# Patient Record
Sex: MALE | Race: WHITE | NOT HISPANIC OR LATINO | Employment: FULL TIME | URBAN - METROPOLITAN AREA
[De-identification: names, ages, dates, MRNs, and addresses within clinical notes are randomized per-mention and may not be internally consistent; named-entity substitution may affect disease eponyms.]

---

## 2020-07-31 ENCOUNTER — APPOINTMENT (OUTPATIENT)
Dept: RADIOLOGY | Facility: MEDICAL CENTER | Age: 55
DRG: 854 | End: 2020-07-31
Attending: EMERGENCY MEDICINE
Payer: COMMERCIAL

## 2020-07-31 ENCOUNTER — HOSPITAL ENCOUNTER (INPATIENT)
Facility: MEDICAL CENTER | Age: 55
LOS: 9 days | DRG: 854 | End: 2020-08-10
Attending: EMERGENCY MEDICINE | Admitting: HOSPITALIST
Payer: COMMERCIAL

## 2020-07-31 DIAGNOSIS — E11.628 DIABETIC FOOT INFECTION (HCC): ICD-10-CM

## 2020-07-31 DIAGNOSIS — L08.9 WOUND INFECTION: ICD-10-CM

## 2020-07-31 DIAGNOSIS — R73.9 HYPERGLYCEMIA: ICD-10-CM

## 2020-07-31 DIAGNOSIS — T14.8XXA WOUND INFECTION: ICD-10-CM

## 2020-07-31 DIAGNOSIS — L08.9 DIABETIC FOOT INFECTION (HCC): ICD-10-CM

## 2020-07-31 LAB
ALBUMIN SERPL BCP-MCNC: 3.3 G/DL (ref 3.2–4.9)
ALBUMIN/GLOB SERPL: 1 G/DL
ALP SERPL-CCNC: 65 U/L (ref 30–99)
ALT SERPL-CCNC: 14 U/L (ref 2–50)
ANION GAP SERPL CALC-SCNC: 8 MMOL/L (ref 7–16)
AST SERPL-CCNC: 15 U/L (ref 12–45)
BASOPHILS # BLD AUTO: 0.7 % (ref 0–1.8)
BASOPHILS # BLD: 0.09 K/UL (ref 0–0.12)
BILIRUB SERPL-MCNC: 0.2 MG/DL (ref 0.1–1.5)
BUN SERPL-MCNC: 22 MG/DL (ref 8–22)
CALCIUM SERPL-MCNC: 8.2 MG/DL (ref 8.4–10.2)
CHLORIDE SERPL-SCNC: 104 MMOL/L (ref 96–112)
CO2 SERPL-SCNC: 23 MMOL/L (ref 20–33)
CREAT SERPL-MCNC: 1.26 MG/DL (ref 0.5–1.4)
EOSINOPHIL # BLD AUTO: 0.3 K/UL (ref 0–0.51)
EOSINOPHIL NFR BLD: 2.3 % (ref 0–6.9)
ERYTHROCYTE [DISTWIDTH] IN BLOOD BY AUTOMATED COUNT: 42.2 FL (ref 35.9–50)
GLOBULIN SER CALC-MCNC: 3.3 G/DL (ref 1.9–3.5)
GLUCOSE BLD-MCNC: 305 MG/DL (ref 65–99)
GLUCOSE SERPL-MCNC: 340 MG/DL (ref 65–99)
HCT VFR BLD AUTO: 42 % (ref 42–52)
HGB BLD-MCNC: 14.3 G/DL (ref 14–18)
IMM GRANULOCYTES # BLD AUTO: 0.05 K/UL (ref 0–0.11)
IMM GRANULOCYTES NFR BLD AUTO: 0.4 % (ref 0–0.9)
LYMPHOCYTES # BLD AUTO: 2.97 K/UL (ref 1–4.8)
LYMPHOCYTES NFR BLD: 22.3 % (ref 22–41)
MCH RBC QN AUTO: 30.6 PG (ref 27–33)
MCHC RBC AUTO-ENTMCNC: 34 G/DL (ref 33.7–35.3)
MCV RBC AUTO: 89.7 FL (ref 81.4–97.8)
MONOCYTES # BLD AUTO: 1.42 K/UL (ref 0–0.85)
MONOCYTES NFR BLD AUTO: 10.7 % (ref 0–13.4)
NEUTROPHILS # BLD AUTO: 8.5 K/UL (ref 1.82–7.42)
NEUTROPHILS NFR BLD: 63.6 % (ref 44–72)
NRBC # BLD AUTO: 0 K/UL
NRBC BLD-RTO: 0 /100 WBC
PLATELET # BLD AUTO: 271 K/UL (ref 164–446)
PMV BLD AUTO: 10.1 FL (ref 9–12.9)
POTASSIUM SERPL-SCNC: 4.6 MMOL/L (ref 3.6–5.5)
PROT SERPL-MCNC: 6.6 G/DL (ref 6–8.2)
RBC # BLD AUTO: 4.68 M/UL (ref 4.7–6.1)
SODIUM SERPL-SCNC: 135 MMOL/L (ref 135–145)
WBC # BLD AUTO: 13.3 K/UL (ref 4.8–10.8)

## 2020-07-31 PROCEDURE — 87040 BLOOD CULTURE FOR BACTERIA: CPT

## 2020-07-31 PROCEDURE — 96365 THER/PROPH/DIAG IV INF INIT: CPT

## 2020-07-31 PROCEDURE — 80053 COMPREHEN METABOLIC PANEL: CPT

## 2020-07-31 PROCEDURE — 700101 HCHG RX REV CODE 250: Performed by: EMERGENCY MEDICINE

## 2020-07-31 PROCEDURE — 85025 COMPLETE CBC W/AUTO DIFF WBC: CPT

## 2020-07-31 PROCEDURE — 73630 X-RAY EXAM OF FOOT: CPT | Mod: RT

## 2020-07-31 PROCEDURE — 99285 EMERGENCY DEPT VISIT HI MDM: CPT

## 2020-07-31 PROCEDURE — 82962 GLUCOSE BLOOD TEST: CPT

## 2020-07-31 RX ORDER — CLINDAMYCIN PHOSPHATE 600 MG/50ML
600 INJECTION, SOLUTION INTRAVENOUS ONCE
Status: COMPLETED | OUTPATIENT
Start: 2020-07-31 | End: 2020-07-31

## 2020-07-31 RX ADMIN — CLINDAMYCIN IN 5 PERCENT DEXTROSE 600 MG: 12 INJECTION, SOLUTION INTRAVENOUS at 22:27

## 2020-07-31 ASSESSMENT — ENCOUNTER SYMPTOMS
SHORTNESS OF BREATH: 0
FEVER: 0
NAUSEA: 0
ABDOMINAL PAIN: 0
VOMITING: 0
HEADACHES: 0
CHILLS: 0

## 2020-08-01 ENCOUNTER — APPOINTMENT (OUTPATIENT)
Dept: RADIOLOGY | Facility: MEDICAL CENTER | Age: 55
DRG: 854 | End: 2020-08-01
Attending: NURSE PRACTITIONER
Payer: COMMERCIAL

## 2020-08-01 ENCOUNTER — APPOINTMENT (OUTPATIENT)
Dept: CARDIOLOGY | Facility: MEDICAL CENTER | Age: 55
DRG: 854 | End: 2020-08-01
Attending: NURSE PRACTITIONER
Payer: COMMERCIAL

## 2020-08-01 PROBLEM — L08.9 DIABETIC FOOT INFECTION (HCC): Status: ACTIVE | Noted: 2020-08-01

## 2020-08-01 PROBLEM — E66.09 CLASS 1 OBESITY DUE TO EXCESS CALORIES WITHOUT SERIOUS COMORBIDITY WITH BODY MASS INDEX (BMI) OF 33.0 TO 33.9 IN ADULT: Status: ACTIVE | Noted: 2020-08-01

## 2020-08-01 PROBLEM — A41.9 SEPSIS (HCC): Status: ACTIVE | Noted: 2020-08-01

## 2020-08-01 PROBLEM — E11.628 DIABETIC FOOT INFECTION (HCC): Status: ACTIVE | Noted: 2020-08-01

## 2020-08-01 PROBLEM — E11.65 TYPE 2 DIABETES MELLITUS WITH HYPERGLYCEMIA, WITHOUT LONG-TERM CURRENT USE OF INSULIN (HCC): Status: ACTIVE | Noted: 2020-08-01

## 2020-08-01 LAB
APTT PPP: 28.7 SEC (ref 24.7–36)
APTT PPP: 41.7 SEC (ref 24.7–36)
COVID ORDER STATUS COVID19: NORMAL
CRP SERPL HS-MCNC: 6.23 MG/DL (ref 0–0.75)
ERYTHROCYTE [SEDIMENTATION RATE] IN BLOOD BY WESTERGREN METHOD: 45 MM/HOUR (ref 0–20)
EST. AVERAGE GLUCOSE BLD GHB EST-MCNC: 252 MG/DL
GLUCOSE BLD-MCNC: 112 MG/DL (ref 65–99)
GLUCOSE BLD-MCNC: 141 MG/DL (ref 65–99)
GLUCOSE BLD-MCNC: 168 MG/DL (ref 65–99)
HBA1C MFR BLD: 10.4 % (ref 0–5.6)
LACTATE BLD-SCNC: 1 MMOL/L (ref 0.5–2)
LACTATE BLD-SCNC: 1.2 MMOL/L (ref 0.5–2)
LV EJECT FRACT  99904: 60
LV EJECT FRACT MOD 2C 99903: 56.2
LV EJECT FRACT MOD 4C 99902: 58.54
LV EJECT FRACT MOD BP 99901: 57.67
SARS-COV-2 RNA RESP QL NAA+PROBE: NOTDETECTED
SPECIMEN SOURCE: NORMAL

## 2020-08-01 PROCEDURE — 85730 THROMBOPLASTIN TIME PARTIAL: CPT

## 2020-08-01 PROCEDURE — 93306 TTE W/DOPPLER COMPLETE: CPT | Mod: 26 | Performed by: INTERNAL MEDICINE

## 2020-08-01 PROCEDURE — 87641 MR-STAPH DNA AMP PROBE: CPT

## 2020-08-01 PROCEDURE — 87186 SC STD MICRODIL/AGAR DIL: CPT | Mod: 91

## 2020-08-01 PROCEDURE — A9270 NON-COVERED ITEM OR SERVICE: HCPCS | Performed by: INTERNAL MEDICINE

## 2020-08-01 PROCEDURE — A9270 NON-COVERED ITEM OR SERVICE: HCPCS | Performed by: HOSPITALIST

## 2020-08-01 PROCEDURE — 93306 TTE W/DOPPLER COMPLETE: CPT

## 2020-08-01 PROCEDURE — 73700 CT LOWER EXTREMITY W/O DYE: CPT | Mod: RT

## 2020-08-01 PROCEDURE — 700117 HCHG RX CONTRAST REV CODE 255: Performed by: NURSE PRACTITIONER

## 2020-08-01 PROCEDURE — 87640 STAPH A DNA AMP PROBE: CPT

## 2020-08-01 PROCEDURE — 700101 HCHG RX REV CODE 250

## 2020-08-01 PROCEDURE — 700102 HCHG RX REV CODE 250 W/ 637 OVERRIDE(OP): Performed by: INTERNAL MEDICINE

## 2020-08-01 PROCEDURE — 87147 CULTURE TYPE IMMUNOLOGIC: CPT

## 2020-08-01 PROCEDURE — 700111 HCHG RX REV CODE 636 W/ 250 OVERRIDE (IP): Performed by: HOSPITALIST

## 2020-08-01 PROCEDURE — 700105 HCHG RX REV CODE 258: Performed by: HOSPITALIST

## 2020-08-01 PROCEDURE — 99223 1ST HOSP IP/OBS HIGH 75: CPT | Performed by: HOSPITALIST

## 2020-08-01 PROCEDURE — 93922 UPR/L XTREMITY ART 2 LEVELS: CPT

## 2020-08-01 PROCEDURE — 83605 ASSAY OF LACTIC ACID: CPT

## 2020-08-01 PROCEDURE — 82962 GLUCOSE BLOOD TEST: CPT | Mod: 91

## 2020-08-01 PROCEDURE — 83036 HEMOGLOBIN GLYCOSYLATED A1C: CPT

## 2020-08-01 PROCEDURE — 87205 SMEAR GRAM STAIN: CPT

## 2020-08-01 PROCEDURE — 700111 HCHG RX REV CODE 636 W/ 250 OVERRIDE (IP)

## 2020-08-01 PROCEDURE — 86140 C-REACTIVE PROTEIN: CPT

## 2020-08-01 PROCEDURE — C9803 HOPD COVID-19 SPEC COLLECT: HCPCS | Performed by: HOSPITALIST

## 2020-08-01 PROCEDURE — 87070 CULTURE OTHR SPECIMN AEROBIC: CPT

## 2020-08-01 PROCEDURE — 700111 HCHG RX REV CODE 636 W/ 250 OVERRIDE (IP): Performed by: NURSE PRACTITIONER

## 2020-08-01 PROCEDURE — 700105 HCHG RX REV CODE 258

## 2020-08-01 PROCEDURE — 700102 HCHG RX REV CODE 250 W/ 637 OVERRIDE(OP): Performed by: HOSPITALIST

## 2020-08-01 PROCEDURE — 85652 RBC SED RATE AUTOMATED: CPT

## 2020-08-01 PROCEDURE — U0003 INFECTIOUS AGENT DETECTION BY NUCLEIC ACID (DNA OR RNA); SEVERE ACUTE RESPIRATORY SYNDROME CORONAVIRUS 2 (SARS-COV-2) (CORONAVIRUS DISEASE [COVID-19]), AMPLIFIED PROBE TECHNIQUE, MAKING USE OF HIGH THROUGHPUT TECHNOLOGIES AS DESCRIBED BY CMS-2020-01-R: HCPCS

## 2020-08-01 PROCEDURE — 87077 CULTURE AEROBIC IDENTIFY: CPT | Mod: 91

## 2020-08-01 PROCEDURE — 770020 HCHG ROOM/CARE - TELE (206)

## 2020-08-01 PROCEDURE — 700101 HCHG RX REV CODE 250: Performed by: HOSPITALIST

## 2020-08-01 PROCEDURE — 87040 BLOOD CULTURE FOR BACTERIA: CPT

## 2020-08-01 RX ORDER — PROMETHAZINE HYDROCHLORIDE 25 MG/1
12.5-25 SUPPOSITORY RECTAL EVERY 4 HOURS PRN
Status: DISCONTINUED | OUTPATIENT
Start: 2020-08-01 | End: 2020-08-10 | Stop reason: HOSPADM

## 2020-08-01 RX ORDER — HEPARIN SODIUM 5000 [USP'U]/100ML
INJECTION, SOLUTION INTRAVENOUS CONTINUOUS
Status: DISCONTINUED | OUTPATIENT
Start: 2020-08-01 | End: 2020-08-07

## 2020-08-01 RX ORDER — CLONIDINE HYDROCHLORIDE 0.1 MG/1
0.1 TABLET ORAL EVERY 6 HOURS PRN
Status: DISCONTINUED | OUTPATIENT
Start: 2020-08-01 | End: 2020-08-10 | Stop reason: HOSPADM

## 2020-08-01 RX ORDER — ONDANSETRON 2 MG/ML
4 INJECTION INTRAMUSCULAR; INTRAVENOUS EVERY 4 HOURS PRN
Status: DISCONTINUED | OUTPATIENT
Start: 2020-08-01 | End: 2020-08-10 | Stop reason: HOSPADM

## 2020-08-01 RX ORDER — MORPHINE SULFATE 4 MG/ML
2 INJECTION, SOLUTION INTRAMUSCULAR; INTRAVENOUS
Status: DISCONTINUED | OUTPATIENT
Start: 2020-08-01 | End: 2020-08-10 | Stop reason: HOSPADM

## 2020-08-01 RX ORDER — SODIUM CHLORIDE, SODIUM LACTATE, POTASSIUM CHLORIDE, CALCIUM CHLORIDE 600; 310; 30; 20 MG/100ML; MG/100ML; MG/100ML; MG/100ML
INJECTION, SOLUTION INTRAVENOUS CONTINUOUS
Status: DISCONTINUED | OUTPATIENT
Start: 2020-08-01 | End: 2020-08-01

## 2020-08-01 RX ORDER — POLYETHYLENE GLYCOL 3350 17 G/17G
1 POWDER, FOR SOLUTION ORAL
Status: DISCONTINUED | OUTPATIENT
Start: 2020-08-01 | End: 2020-08-10 | Stop reason: HOSPADM

## 2020-08-01 RX ORDER — PROCHLORPERAZINE EDISYLATE 5 MG/ML
5-10 INJECTION INTRAMUSCULAR; INTRAVENOUS EVERY 4 HOURS PRN
Status: DISCONTINUED | OUTPATIENT
Start: 2020-08-01 | End: 2020-08-10 | Stop reason: HOSPADM

## 2020-08-01 RX ORDER — HEPARIN SODIUM 1000 [USP'U]/ML
3200 INJECTION, SOLUTION INTRAVENOUS; SUBCUTANEOUS PRN
Status: DISCONTINUED | OUTPATIENT
Start: 2020-08-01 | End: 2020-08-07

## 2020-08-01 RX ORDER — OXYCODONE HYDROCHLORIDE 5 MG/1
5 TABLET ORAL
Status: DISCONTINUED | OUTPATIENT
Start: 2020-08-01 | End: 2020-08-10 | Stop reason: HOSPADM

## 2020-08-01 RX ORDER — HEPARIN SODIUM 1000 [USP'U]/ML
6000 INJECTION, SOLUTION INTRAVENOUS; SUBCUTANEOUS ONCE
Status: COMPLETED | OUTPATIENT
Start: 2020-08-01 | End: 2020-08-01

## 2020-08-01 RX ORDER — INSULIN GLARGINE 100 [IU]/ML
0.2 INJECTION, SOLUTION SUBCUTANEOUS EVERY EVENING
Status: DISCONTINUED | OUTPATIENT
Start: 2020-08-01 | End: 2020-08-03

## 2020-08-01 RX ORDER — OXYCODONE HYDROCHLORIDE 5 MG/1
2.5 TABLET ORAL
Status: DISCONTINUED | OUTPATIENT
Start: 2020-08-01 | End: 2020-08-10 | Stop reason: HOSPADM

## 2020-08-01 RX ORDER — ACETAMINOPHEN 325 MG/1
650 TABLET ORAL EVERY 6 HOURS PRN
Status: DISCONTINUED | OUTPATIENT
Start: 2020-08-01 | End: 2020-08-10 | Stop reason: HOSPADM

## 2020-08-01 RX ORDER — METRONIDAZOLE 500 MG/1
500 TABLET ORAL EVERY 8 HOURS
Status: DISCONTINUED | OUTPATIENT
Start: 2020-08-01 | End: 2020-08-03

## 2020-08-01 RX ORDER — PROMETHAZINE HYDROCHLORIDE 25 MG/1
12.5-25 TABLET ORAL EVERY 4 HOURS PRN
Status: DISCONTINUED | OUTPATIENT
Start: 2020-08-01 | End: 2020-08-10 | Stop reason: HOSPADM

## 2020-08-01 RX ORDER — DEXTROSE MONOHYDRATE 25 G/50ML
50 INJECTION, SOLUTION INTRAVENOUS
Status: DISCONTINUED | OUTPATIENT
Start: 2020-08-01 | End: 2020-08-03

## 2020-08-01 RX ORDER — ONDANSETRON 4 MG/1
4 TABLET, ORALLY DISINTEGRATING ORAL EVERY 4 HOURS PRN
Status: DISCONTINUED | OUTPATIENT
Start: 2020-08-01 | End: 2020-08-10 | Stop reason: HOSPADM

## 2020-08-01 RX ORDER — AMOXICILLIN 250 MG
2 CAPSULE ORAL 2 TIMES DAILY
Status: DISCONTINUED | OUTPATIENT
Start: 2020-08-01 | End: 2020-08-10 | Stop reason: HOSPADM

## 2020-08-01 RX ORDER — BISACODYL 10 MG
10 SUPPOSITORY, RECTAL RECTAL
Status: DISCONTINUED | OUTPATIENT
Start: 2020-08-01 | End: 2020-08-10 | Stop reason: HOSPADM

## 2020-08-01 RX ORDER — SODIUM CHLORIDE, SODIUM LACTATE, POTASSIUM CHLORIDE, AND CALCIUM CHLORIDE .6; .31; .03; .02 G/100ML; G/100ML; G/100ML; G/100ML
30 INJECTION, SOLUTION INTRAVENOUS
Status: DISCONTINUED | OUTPATIENT
Start: 2020-08-01 | End: 2020-08-10 | Stop reason: HOSPADM

## 2020-08-01 RX ADMIN — ENOXAPARIN SODIUM 40 MG: 40 INJECTION SUBCUTANEOUS at 05:57

## 2020-08-01 RX ADMIN — HEPARIN SODIUM 3200 UNITS: 1000 INJECTION, SOLUTION INTRAVENOUS; SUBCUTANEOUS at 23:14

## 2020-08-01 RX ADMIN — INSULIN GLARGINE 20 UNITS: 100 INJECTION, SOLUTION SUBCUTANEOUS at 18:03

## 2020-08-01 RX ADMIN — METRONIDAZOLE 500 MG: 500 INJECTION, SOLUTION INTRAVENOUS at 05:58

## 2020-08-01 RX ADMIN — INSULIN LISPRO 7 UNITS: 100 INJECTION, SOLUTION INTRAVENOUS; SUBCUTANEOUS at 17:58

## 2020-08-01 RX ADMIN — ACETAMINOPHEN 650 MG: 325 TABLET, FILM COATED ORAL at 08:41

## 2020-08-01 RX ADMIN — ACETAMINOPHEN 650 MG: 325 TABLET, FILM COATED ORAL at 01:45

## 2020-08-01 RX ADMIN — ACETAMINOPHEN 650 MG: 325 TABLET, FILM COATED ORAL at 21:37

## 2020-08-01 RX ADMIN — HEPARIN SODIUM 1200 UNITS/HR: 5000 INJECTION, SOLUTION INTRAVENOUS at 15:31

## 2020-08-01 RX ADMIN — VANCOMYCIN HYDROCHLORIDE 2500 MG: 500 INJECTION, POWDER, LYOPHILIZED, FOR SOLUTION INTRAVENOUS at 02:20

## 2020-08-01 RX ADMIN — CEFTRIAXONE SODIUM 2 G: 2 INJECTION, POWDER, FOR SOLUTION INTRAMUSCULAR; INTRAVENOUS at 01:45

## 2020-08-01 RX ADMIN — METRONIDAZOLE 500 MG: 500 TABLET ORAL at 15:11

## 2020-08-01 RX ADMIN — SODIUM CHLORIDE, POTASSIUM CHLORIDE, SODIUM LACTATE AND CALCIUM CHLORIDE: 600; 310; 30; 20 INJECTION, SOLUTION INTRAVENOUS at 02:20

## 2020-08-01 RX ADMIN — HEPARIN SODIUM 6000 UNITS: 1000 INJECTION, SOLUTION INTRAVENOUS; SUBCUTANEOUS at 15:30

## 2020-08-01 RX ADMIN — METRONIDAZOLE 500 MG: 500 TABLET ORAL at 21:37

## 2020-08-01 RX ADMIN — INSULIN LISPRO 7 UNITS: 100 INJECTION, SOLUTION INTRAVENOUS; SUBCUTANEOUS at 06:41

## 2020-08-01 RX ADMIN — INSULIN LISPRO 1 UNITS: 100 INJECTION, SOLUTION INTRAVENOUS; SUBCUTANEOUS at 06:39

## 2020-08-01 RX ADMIN — ACETAMINOPHEN 650 MG: 325 TABLET, FILM COATED ORAL at 15:18

## 2020-08-01 RX ADMIN — HUMAN ALBUMIN MICROSPHERES AND PERFLUTREN 3 ML: 10; .22 INJECTION, SOLUTION INTRAVENOUS at 15:34

## 2020-08-01 ASSESSMENT — LIFESTYLE VARIABLES
ON A TYPICAL DAY WHEN YOU DRINK ALCOHOL HOW MANY DRINKS DO YOU HAVE: 0
TOTAL SCORE: 0
TOTAL SCORE: 0
EVER HAD A DRINK FIRST THING IN THE MORNING TO STEADY YOUR NERVES TO GET RID OF A HANGOVER: NO
EVER FELT BAD OR GUILTY ABOUT YOUR DRINKING: NO
HAVE PEOPLE ANNOYED YOU BY CRITICIZING YOUR DRINKING: NO
HOW MANY TIMES IN THE PAST YEAR HAVE YOU HAD 5 OR MORE DRINKS IN A DAY: 0
HAVE YOU EVER FELT YOU SHOULD CUT DOWN ON YOUR DRINKING: NO
EVER_SMOKED: NEVER
CONSUMPTION TOTAL: NEGATIVE
AVERAGE NUMBER OF DAYS PER WEEK YOU HAVE A DRINK CONTAINING ALCOHOL: 0
TOTAL SCORE: 0
ALCOHOL_USE: NO

## 2020-08-01 ASSESSMENT — ENCOUNTER SYMPTOMS
PALPITATIONS: 1
PSYCHIATRIC NEGATIVE: 1
ORTHOPNEA: 0
RESPIRATORY NEGATIVE: 1
DOUBLE VISION: 0
WHEEZING: 0
SHORTNESS OF BREATH: 0
HEARTBURN: 0
NEUROLOGICAL NEGATIVE: 1
EYE PAIN: 0
STRIDOR: 0
BLURRED VISION: 1
NAUSEA: 0
DIAPHORESIS: 0
SINUS PAIN: 0
CHILLS: 0
HEMOPTYSIS: 0
CARDIOVASCULAR NEGATIVE: 1
COUGH: 0
FEVER: 0
CONSTITUTIONAL NEGATIVE: 1
MUSCULOSKELETAL NEGATIVE: 1
GASTROINTESTINAL NEGATIVE: 1
EYE DISCHARGE: 0
WEIGHT LOSS: 0
ABDOMINAL PAIN: 0
EYE REDNESS: 0
PHOTOPHOBIA: 0
CONSTIPATION: 0
SPUTUM PRODUCTION: 0
EYES NEGATIVE: 1
DIARRHEA: 0
VOMITING: 0
SORE THROAT: 0

## 2020-08-01 ASSESSMENT — COPD QUESTIONNAIRES
DURING THE PAST 4 WEEKS HOW MUCH DID YOU FEEL SHORT OF BREATH: NONE/LITTLE OF THE TIME
IN THE PAST 12 MONTHS DO YOU DO LESS THAN YOU USED TO BECAUSE OF YOUR BREATHING PROBLEMS: DISAGREE/UNSURE
DO YOU EVER COUGH UP ANY MUCUS OR PHLEGM?: NO/ONLY WITH OCCASIONAL COLDS OR INFECTIONS
COPD SCREENING SCORE: 2
HAVE YOU SMOKED AT LEAST 100 CIGARETTES IN YOUR ENTIRE LIFE: NO/DON'T KNOW

## 2020-08-01 ASSESSMENT — COGNITIVE AND FUNCTIONAL STATUS - GENERAL
SUGGESTED CMS G CODE MODIFIER MOBILITY: CH
SUGGESTED CMS G CODE MODIFIER DAILY ACTIVITY: CH
MOBILITY SCORE: 24
DAILY ACTIVITIY SCORE: 24

## 2020-08-01 ASSESSMENT — PATIENT HEALTH QUESTIONNAIRE - PHQ9
1. LITTLE INTEREST OR PLEASURE IN DOING THINGS: NOT AT ALL
2. FEELING DOWN, DEPRESSED, IRRITABLE, OR HOPELESS: NOT AT ALL
SUM OF ALL RESPONSES TO PHQ9 QUESTIONS 1 AND 2: 0

## 2020-08-01 ASSESSMENT — FIBROSIS 4 INDEX: FIB4 SCORE: 0.8

## 2020-08-01 NOTE — PROGRESS NOTES
[id number] - report from Christiano PIERRE.  Pt resting comfortably in bed.  A&O x 4.  Up to amb to br, steady on feet.  Fall precautions in effect.  Pt calls approp for asst oob.  C/o R 4/5th toe pain, see charting for interventions. Denies other pain, sob, dizziness, nausea. See flowsheet for assess.  poc reviewed with pt, pt vu, all questions answered,  Vss.    Good po intake with am meal, vikram diet well.    Pt npo for pending sx, pt aware.

## 2020-08-01 NOTE — PROGRESS NOTES
Report received from [facility]. Pt arrived to unit via gurney.  Pt ambulated to bed without difficulty. Vitals taken. Pt assessed. A&Ox4. Admit profile and med rec completed. Discussed POC with pt, including monitoring and trending lab levels and pertinent tests. Welcome folder provided and discussed. Communication board filled out. Questions and concerns addressed, verbalized understanding. Fall precautions in place. Pt demonstrates ability to use call light appropriately. Pt left in lowest position. Bed locked and low.

## 2020-08-01 NOTE — PROGRESS NOTES
Tele summary :  Rhythm:  sr  Rate:  93  AR:  .16  QRS:  .08  QT:  .36    Ectopy:  none    Telemetry strips signed and placed in chart.

## 2020-08-01 NOTE — ASSESSMENT & PLAN NOTE
Body mass index is 32.88 kg/m².  -Could likely benefit from outpatient weight loss counseling after his acute issues have resolved.  This can be arranged through his PCP at the appropriate time.  -Counseled

## 2020-08-01 NOTE — ED PROVIDER NOTES
ED Provider Note    Primary care provider: None  Means of arrival: private vehicle  History obtained from: patient  History limited by:  none    CHIEF COMPLAINT  Chief Complaint   Patient presents with   • Wound Check       HPI  Mc Eugene is a 54 y.o. male who presents to the Emergency Department for foot wound.  Patient reports for the last couple of months he has had pain to his right fourth and fifth toes after hitting them against something.  Over the last 3 weeks he has noted that his toes have turned a bluish color, then over the last week patient developed a wound between his fourth and fifth toes which brought him in for further evaluation.  He reports some surrounding erythema as well.  He reports that he has mild throbbing discomfort localized to the toes that is 2 out of 10 in severity.  Has still been able to ambulate without difficulty.  Denies numbness, tingling or weakness.  Patient does have a history of diabetes which is untreated for 15 years as he does not follow with a regular PCP.  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  Patient denies fevers, chills, nausea, vomiting, abdominal pain.    REVIEW OF SYSTEMS  Review of Systems   Constitutional: Negative for chills and fever.   Respiratory: Negative for shortness of breath.    Cardiovascular: Negative for chest pain.   Gastrointestinal: Negative for abdominal pain, nausea and vomiting.   Skin:        + foot wound   Neurological: Negative for headaches.   All other systems reviewed and are negative.        PAST MEDICAL HISTORY   has a past medical history of Diabetes (HCC) and Kidney anomaly, congenital.    SURGICAL HISTORY   has a past surgical history that includes appendectomy.    SOCIAL HISTORY  Social History     Tobacco Use   • Smoking status: Former Smoker   • Smokeless tobacco: Never Used   Substance Use Topics   • Alcohol use: Not Currently   • Drug use: Yes     Comment: marijuana vape     "  Social History     Substance and Sexual Activity   Drug Use Yes    Comment: marijuana vape       FAMILY HISTORY  Family History   Problem Relation Age of Onset   • Lung Disease Mother    • Cancer Mother        CURRENT MEDICATIONS  Home Medications     Reviewed by Christiano Rodriguez R.N. (Registered Nurse) on 08/01/20 at 0110  Med List Status: Not Addressed   Medication Last Dose Status        Patient Vincenzo Taking any Medications                       ALLERGIES  Allergies   Allergen Reactions   • Pcn [Penicillins] Hives       PHYSICAL EXAM  VITAL SIGNS: /93   Pulse (!) 112   Temp 36.5 °C (97.7 °F) (Temporal)   Resp 18   Ht 1.727 m (5' 8\")   Wt 99.2 kg (218 lb 11.1 oz)   SpO2 96%   BMI 33.25 kg/m²   Vitals reviewed by myself.  Physical Exam   Constitutional: He is well-developed, well-nourished, and in no distress. No distress.   Cardiovascular:   2+ bilateral distal pedal pulses, tachycardic rate   Pulmonary/Chest: Effort normal and breath sounds normal.   Musculoskeletal: Normal range of motion.      Comments: Patient has full range of motion of all toes   Neurological:   Sensation intact all toes   Skin: Skin is warm.   Patient has bluish-purple discoloration to the right fourth and fifth digits with an open wound between the toes, there is blanching erythema that extends to the mid foot         DIAGNOSTIC STUDIES /  LABS  Labs Reviewed   CBC WITH DIFFERENTIAL - Abnormal; Notable for the following components:       Result Value    WBC 13.3 (*)     RBC 4.68 (*)     Neutrophils (Absolute) 8.50 (*)     Monos (Absolute) 1.42 (*)     All other components within normal limits   COMP METABOLIC PANEL - Abnormal; Notable for the following components:    Glucose 340 (*)     Calcium 8.2 (*)     All other components within normal limits   ESTIMATED GFR - Abnormal; Notable for the following components:    GFR If Non  59 (*)     All other components within normal limits   ACCU-CHEK GLUCOSE - " "Abnormal; Notable for the following components:    Glucose - Accu-Ck 305 (*)     All other components within normal limits   BLOOD CULTURE    Narrative:     Per Hospital Policy: Only change Specimen Src: to \"Line\" if  specified by physician order.   BLOOD CULTURE    Narrative:     Per Hospital Policy: Only change Specimen Src: to \"Line\" if  specified by physician order.   COVID/SARS COV-2   LACTIC ACID   SARS-COV-2, PCR (IN-HOUSE)   LACTIC ACID       All labs reviewed by me.      RADIOLOGY  DX-FOOT-COMPLETE 3+ RIGHT   Final Result         1.  No acute traumatic bony injury.        The radiologist's interpretation of all radiological studies have been reviewed by me.        COURSE & MEDICAL DECISION MAKING  Nursing notes, VS, PMSFHx reviewed in chart.    Patient is a 54-year-old male who comes in for evaluation of right foot wound.  Differential diagnosis includes gangrene, diabetic foot wound, cellulitis, osteomyelitis, uncontrolled hyperglycemia.  Diagnostic work-up includes labs and foot x-ray.    Patient is initial vitals notable for slight tachycardia, he is otherwise well-appearing and neurovascularly intact on exam.  His exam is consistent with gangrene and cellulitis and therefore I will start him on clindamycin IV.  I did advise patient that we will likely have to see how his foot responds to IV antibiotics, and there is a chance he may require amputation of his toes.  Patient's blood sugar returns and is elevated at 305.  He has a mild leukocytosis of 13.3.  X-ray returns and demonstrates no acute bony abnormalities, no obvious evidence of osteomyelitis.  Plan to be to hospitalize patient for further management of his skin and soft tissue infection and potential orthopedic/podiatry consultation if needed for possible amputation.  I discussed the case with Dr. Garcia who has accepted patient for hospitalization.  Patient is in guarded condition.      FINAL IMPRESSION  1. Wound infection    2. Hyperglycemia     "

## 2020-08-01 NOTE — CARE PLAN
Problem: Communication  Goal: The ability to communicate needs accurately and effectively will improve  Outcome: PROGRESSING AS EXPECTED     Problem: Safety  Goal: Will remain free from injury  Outcome: PROGRESSING AS EXPECTED     Problem: Knowledge Deficit  Goal: Knowledge of disease process/condition, treatment plan, diagnostic tests, and medications will improve  Outcome: PROGRESSING AS EXPECTED  Intervention: Assess knowledge level of disease process/condition, treatment plan, diagnostic tests, and medications  Note: Poc to be reviewed with pt daily.  Pt encouraged to call with any questions/concerns.      Problem: Safety  Goal: Will remain free from falls  Intervention: Assess risk factors for falls  Flowsheets (Taken 8/1/2020 1000)  Pt Calls for Assistance: Yes  Note:  Pt up to br self, steady on feet. encouraged to call for asst oob/chair.

## 2020-08-01 NOTE — ED TRIAGE NOTES
Pt presents from home with wife for wound on right 4-5 toe. Started with pain 3-4 weeks ago. Worsened this past week. Pt is diabetic. No fever. Pt A&Ox4 and ambulatory. Pt has not regularly check blood sugar in over a year.     Patient masked. No respiratory symptoms, no recent travel, denies known COVID exposure.

## 2020-08-01 NOTE — ASSESSMENT & PLAN NOTE
-Was on Dapto post RIGHT 5th toe amputation with Dr. Gross - on Augmentin now  -Orthopedic surgery following.   -Wound care consulted.

## 2020-08-01 NOTE — CARE PLAN
Problem: Communication  Goal: The ability to communicate needs accurately and effectively will improve  Outcome: PROGRESSING AS EXPECTED     Problem: Safety  Goal: Will remain free from injury  Outcome: PROGRESSING AS EXPECTED  Note: Bed locked and low.  Goal: Will remain free from falls  Outcome: PROGRESSING AS EXPECTED  Note: Pt ambulatory and independent, however provided pt with non-skid socks.      Problem: Venous Thromboembolism (VTW)/Deep Vein Thrombosis (DVT) Prevention:  Goal: Patient will participate in Venous Thrombosis (VTE)/Deep Vein Thrombosis (DVT)Prevention Measures  Outcome: PROGRESSING AS EXPECTED  Note: Pt on Lovenox and ambulatory.      Problem: Knowledge Deficit  Goal: Knowledge of the prescribed therapeutic regimen will improve  Outcome: PROGRESSING AS EXPECTED  Note: Pt verbalizes understanding of POC.      Problem: Pain Management  Goal: Pain level will decrease to patient's comfort goal  Outcome: PROGRESSING AS EXPECTED  Note: Pt states Tylenol helps with pain. Liver enzymes WNL. Will continue to treat with this, per MD order.      Problem: Infection  Goal: Will remain free from infection  Outcome: PROGRESSING SLOWER THAN EXPECTED  Note: WBC 13.3, heart rate above 90. Blood cx have been sent. Currently receiving IVF and ABTx.      Problem: Knowledge Deficit  Goal: Knowledge of disease process/condition, treatment plan, diagnostic tests, and medications will improve  Outcome: PROGRESSING SLOWER THAN EXPECTED  Note: Pt blood sugar 305. Will need diabetes education.

## 2020-08-01 NOTE — NON-PROVIDER
Daily Progress Note     Date of Service  8/1/2020     Chief Complaint  54 y.o. male admitted 7/31/2020 with wound to right 4-5 toes     Hospital Course  Patient has history of diabetes mellitus with very poor control and congenital single kidney presented with wound to 4-5th right toes.  He reports having kicked some furniture some time ago with his right foot, and over the next several days having increasing pain, as well as redness and then blue discoloration of his fourth and fifth toes.  He reports not doing much about this, however in the days just before admission, the pain and swelling and discoloration became much worse, prompting his visit to the emergency department. LPS was consulted, however, they do not come to [facility].     Interval Problem Update  8/1 Thoroughly examined wound which does appear to have an ulcer in between his 4th and 5th left toes.  Discussed DM management which patient is agreeable to working on.  Placed patient NPO and consulted Ortho, who requested CT of foot and have patient tentatively scheduled for surgery this afternoon.  Upon review of CT, ortho chose to hold off on surgery and start heparin drip in hopes to salvage the toe.  We also ordered vascular studies to rule out any vascular cause for this wound. AGUSTINA studies reveal that right 4-5 toes have wave forms consistent with severe ischemia, likely embolic in nature, echo ordered, tele monitoring ordered. Labs and vitals reviewed. Blood sugars coming closer to normal with the administration of insulin. WBC elevated, ordered wound culture.     Consultants/Specialty  Ortho     Code Status  Full     Disposition  Home      Review of Systems  Review of Systems   Constitutional: Negative for chills, diaphoresis, fever, malaise/fatigue and weight loss.   HENT: Negative for congestion, ear discharge, ear pain, hearing loss, nosebleeds, sinus pain, sore throat and tinnitus.    Eyes: Positive for blurred vision. Negative for double vision,  photophobia, pain, discharge and redness.   Respiratory: Negative for cough, hemoptysis, sputum production, shortness of breath, wheezing and stridor.    Cardiovascular: Positive for chest pain and palpitations. Negative for orthopnea.   Gastrointestinal: Negative for abdominal pain, constipation, diarrhea, heartburn, nausea and vomiting.   Genitourinary: Negative for dysuria, frequency and urgency.   Musculoskeletal: Negative.    Skin: Negative for itching and rash.   Neurological: Negative.    Endo/Heme/Allergies: Negative.    Psychiatric/Behavioral: Negative.    All other systems reviewed and are negative.        Physical Exam  Temp:  [36.5 °C (97.7 °F)-36.7 °C (98.1 °F)] 36.7 °C (98.1 °F)  Pulse:  [] 92  Resp:  [18] 18  BP: (133-163)/(80-93) 163/80  SpO2:  [94 %-96 %] 95 %     Physical Exam  Vitals signs and nursing note reviewed. Exam conducted with a chaperone present.   Constitutional:       General: He is not in acute distress.     Appearance: Normal appearance. He is not ill-appearing.   HENT:      Head: Normocephalic and atraumatic.      Nose: Nose normal.      Mouth/Throat:      Mouth: Mucous membranes are moist.   Eyes:      Extraocular Movements: Extraocular movements intact.      Conjunctiva/sclera: Conjunctivae normal.      Pupils: Pupils are equal, round, and reactive to light.   Neck:      Musculoskeletal: Normal range of motion.   Cardiovascular:      Rate and Rhythm: Regular rhythm. Tachycardia present.      Pulses: Normal pulses.      Heart sounds: Normal heart sounds.   Pulmonary:      Effort: Pulmonary effort is normal.   Abdominal:      General: Bowel sounds are normal.      Palpations: Abdomen is soft.   Musculoskeletal:         General: Swelling and tenderness present.      Right lower leg: Edema present.        Feet:    Feet:      Right foot:      Skin integrity: Ulcer present.   Skin:     Capillary Refill: Capillary refill takes less than 2 seconds.      Findings: Erythema present.    Neurological:      General: No focal deficit present.      Mental Status: He is alert.   Psychiatric:         Mood and Affect: Mood normal.         Behavior: Behavior normal.            Fluids    Intake/Output Summary (Last 24 hours) at 8/1/2020 1155  Last data filed at 8/1/2020 0800      Gross per 24 hour   Intake 120 ml   Output --   Net 120 ml        Laboratory      Recent Labs     07/31/20  2226   WBC 13.3*   RBC 4.68*   HEMOGLOBIN 14.3   HEMATOCRIT 42.0   MCV 89.7   MCH 30.6   MCHC 34.0   RDW 42.2   PLATELETCT 271   MPV 10.1         Recent Labs     07/31/20  2226   SODIUM 135   POTASSIUM 4.6   CHLORIDE 104   CO2 23   GLUCOSE 340*   BUN 22   CREATININE 1.26   CALCIUM 8.2*                    Imaging  DX Foot- Right  7/31/2020 10:24 PM     HISTORY/REASON FOR EXAM: Atraumatic Pain/Swelling/Deformity; 4th and 5th digit wounds     TECHNIQUE/EXAM DESCRIPTION:  AP, lateral, and oblique views of the RIGHT foot.     COMPARISON:  None     FINDINGS:     The bony structures and articulations appear within normal limits without visualized fracture, subluxation, or dislocation.     IMPRESSION:        1.  No acute traumatic bony injury.     CT-FOOT W/O PLUS RECONS RIGHT      8/1/2020 11:47 AM     HISTORY/REASON FOR EXAM:  Right foot pain. Diabetic foot ulcer. Evaluate for osteomyelitis.        TECHNIQUE/ EXAM DESCRIPTION:  CT scan of the RIGHT ankle/foot without contrast, with reconstructions.     Thin-section helical noncontrast images were obtained from the distal tibia/fibula through the forefoot. Sagittal and coronal reconstructions were generated from the axial images.     Up to date radiation dose reduction adjustments have been utilized to meet ALARA standards for radiation dose reduction.     COMPARISON:  Plain films of the right foot 7/31/2020.     FINDINGS:  No ankle fracture or dislocation is present.  No fluid fracture or dislocation is identified.  No lytic or blastic change is identified to suggest acute or  subacute osteomyelitis. No area of focal cortical thickening or sclerosis is identified.  There is soft tissue swelling in the plantar region of the foot below the proximal phalanx of the 2nd toe.  A small skin ulcer is present at this level.  No loculated fluid collection is identified to suggest abscess.     IMPRESSION:     1.  Soft tissue swelling and small skin ulcer in the plantar aspect of the right foot below the distal aspect of the proximal phalanx of the 2nd toe.     2.  No CT evidence of osteomyelitis.     3.  No subcutaneous abscess identified.        [place]-AGUSTINA SINGLE LEVEL BILAT  Findings   Bilateral.    Doppler waveforms of the common femoral, popliteal, posterior tibial, and    dorsalis pedis arteries are of high amplitude and triphasic.    Ankle-brachial indices are normal.      PPG waveforms were obtained for all toes:   Flatlined PPG waveforms in the right 4th and 5th digits consistent with    severe ischemia, likely embolic in nature.   PPG waveforms are normal in all remaining digits.      Additional testing was not performed in accordance with lower extremity    arterial evaluation protocol.       Assessment/Plan  Ischemia of 4-5 right toes  Assessment & Plan  - AGUSTINA studies reveal that right 4-5 toes have wave forms consistent with severe ischemia, likely embolic in nature, echo ordered, tele monitoring ordered.  - Ortho consulted and requested CT which showed no CT evidence of osteomyelitis and no subQ abscess identified.   Ortho felt that patient's toes may be salvageable.  Started heparin drip and continue ABX, will monitor for a few days.  - continue IV ABX rocephin, PO flagyl.  Wound culture ordered.      Sepsis (HCC)  Assessment & Plan  -This is Sepsis Present on admission  - SIRS criteria identified on my evaluation include: Tachycardia, with heart rate greater than     90 BPM and Leukocytosis, with WBC greater than 12,000 still present  - continue IV ABX, flagyl and rocephin.  Wound culture  ordered.   - IV fluids completed per sepsis protocol.     Class 1 obesity due to excess calories without serious comorbidity with body mass index (BMI) of 33.0 to 33.9 in adult  Assessment & Plan  Body mass index is 33.25 kg/m².   Started on ADA diet     Type 2 diabetes mellitus with hyperglycemia, without long-term current use of insulin (HCC)  Assessment & Plan  - Not currently on home regimen for past 10 to 15 years.    - basal prandial insulin therapy is bringing insulin levels under control.    - continue to monitor.    - ADA diet ordered.    - Diabetic education ordered  - A1C in progress     VTE prophylaxis: lovenox

## 2020-08-01 NOTE — ASSESSMENT & PLAN NOTE
This is Sepsis Present on admission  SIRS criteria identified on my evaluation include: Tachycardia, with heart rate greater than 90 BPM and Leukocytosis, with WBC greater than 12,000  Source is diabetic foot infection   Sepsis protocol initiated  Fluid resuscitation ordered per protocol  IV antibiotics as appropriate for source of sepsis  While organ dysfunction may be noted elsewhere in this problem list or in the chart, degree of organ dysfunction does not meet CMS criteria for severe sepsis

## 2020-08-01 NOTE — ASSESSMENT & PLAN NOTE
-FSBS with a couple of mildly elevated readings today. Will increase lantus from 20 units to 22 units & monitor response.   -A1C 10.4% this admission.   -DM education ordered.   -Continue FSBS ACHS. Continue lantus & SSI.   -DM diet.   -Hypoglycemia protocol in place if needed  Monitor BS

## 2020-08-01 NOTE — H&P
Hospital Medicine History & Physical Note    Date of Service  8/1/2020    Primary Care Physician  No primary care provider on file.    Code Status  Full Code    Chief Complaint  Chief Complaint   Patient presents with   • Wound Check       History of Presenting Illness  54 y.o. male with history of diabetes mellitus with very poor control, congenital single kidney, and obesity, was in his usual state of health until approximately 3 weeks prior to admission.  He reports having some type of injury to his right foot, and over the next several days having increasing pain, as well as redness and then blue discoloration of his fourth and fifth toes.  He reports not doing much about this, however in the days just before admission, the pain and swelling and discoloration became much worse, prompting his visit to the emergency department.  He currently denies any headache or vision changes he has no chest pain or shortness of breath, no abdominal pain, nausea vomit, diarrhea or constipation.  He notes no fever.  No other complaints.    Review of Systems  Review of Systems   Constitutional: Negative.    HENT: Negative.    Eyes: Negative.    Respiratory: Negative.    Cardiovascular: Negative.    Gastrointestinal: Negative.    Genitourinary: Negative.    Musculoskeletal: Negative.    Skin: Negative.    Neurological: Negative.    Endo/Heme/Allergies: Negative.    Psychiatric/Behavioral: Negative.        Past Medical History   has a past medical history of Diabetes (HCC) and Kidney anomaly, congenital.    Surgical History   has a past surgical history that includes appendectomy.     Family History  family history includes Cancer in his mother; Lung Disease in his mother.     Social History   reports that he has quit smoking. He has never used smokeless tobacco. He reports previous alcohol use. He reports current drug use.    Allergies  Allergies   Allergen Reactions   • Pcn [Penicillins] Hives       Medications  None        Physical Exam  Temp:  [36.5 °C (97.7 °F)] 36.5 °C (97.7 °F)  Pulse:  [] 91  Resp:  [18] 18  BP: (133-146)/(84-93) 133/84  SpO2:  [95 %-96 %] 95 %    Physical Exam  Vitals signs and nursing note reviewed.   Constitutional:       General: He is not in acute distress.     Appearance: Normal appearance. He is not ill-appearing.   HENT:      Head: Normocephalic and atraumatic.      Nose: Nose normal.      Mouth/Throat:      Mouth: Mucous membranes are moist.      Pharynx: Oropharynx is clear. No oropharyngeal exudate or posterior oropharyngeal erythema.   Eyes:      Extraocular Movements: Extraocular movements intact.      Conjunctiva/sclera: Conjunctivae normal.      Pupils: Pupils are equal, round, and reactive to light.   Neck:      Musculoskeletal: Normal range of motion and neck supple. No muscular tenderness.   Cardiovascular:      Rate and Rhythm: Normal rate and regular rhythm.      Pulses: Normal pulses.      Heart sounds: Normal heart sounds. No murmur. No friction rub. No gallop.    Pulmonary:      Effort: Pulmonary effort is normal. No respiratory distress.      Breath sounds: Normal breath sounds. No wheezing, rhonchi or rales.   Abdominal:      General: Abdomen is flat. Bowel sounds are normal. There is no distension.      Palpations: Abdomen is soft. There is no mass.      Tenderness: There is no abdominal tenderness.   Musculoskeletal: Normal range of motion.         General: Deformity present. No swelling.      Right lower leg: Edema present.      Comments: Right fourth and fifth toes with discoloration, blue in appearance, pulses felt, swelling surrounding.   Lymphadenopathy:      Cervical: No cervical adenopathy.   Skin:     General: Skin is warm and dry.      Findings: Lesion present.   Neurological:      General: No focal deficit present.      Mental Status: He is alert and oriented to person, place, and time.      Cranial Nerves: No cranial nerve deficit.      Motor: No weakness.       Gait: Gait normal.   Psychiatric:         Mood and Affect: Mood normal.         Behavior: Behavior normal.         Laboratory:  Recent Labs     07/31/20  2226   WBC 13.3*   RBC 4.68*   HEMOGLOBIN 14.3   HEMATOCRIT 42.0   MCV 89.7   MCH 30.6   MCHC 34.0   RDW 42.2   PLATELETCT 271   MPV 10.1     Recent Labs     07/31/20  2226   SODIUM 135   POTASSIUM 4.6   CHLORIDE 104   CO2 23   GLUCOSE 340*   BUN 22   CREATININE 1.26   CALCIUM 8.2*     Recent Labs     07/31/20  2226   ALTSGPT 14   ASTSGOT 15   ALKPHOSPHAT 65   TBILIRUBIN 0.2   GLUCOSE 340*         No results for input(s): NTPROBNP in the last 72 hours.      No results for input(s): TROPONINT in the last 72 hours.    Imaging:  DX-FOOT-COMPLETE 3+ RIGHT   Final Result         1.  No acute traumatic bony injury.            Assessment/Plan:  I anticipate this patient will require at least two midnights for appropriate medical management, necessitating inpatient admission.    * Diabetic foot infection (HCC)  Assessment & Plan  With possible gangrene of the right fourth and fifth toes.  Plan for intravenous antibiotic therapy, limb preservation consultation.    Sepsis ([facility])  Assessment & Plan  This is Sepsis Present on admission  SIRS criteria identified on my evaluation include: Tachycardia, with heart rate greater than 90 BPM and Leukocytosis, with WBC greater than 12,000  Source is diabetic foot infection   Sepsis protocol initiated  Fluid resuscitation ordered per protocol  IV antibiotics as appropriate for source of sepsis  While organ dysfunction may be noted elsewhere in this problem list or in the chart, degree of organ dysfunction does not meet CMS criteria for severe sepsis          Class 1 obesity due to excess calories without serious comorbidity with body mass index (BMI) of 33.0 to 33.9 in adult  Assessment & Plan  Body mass index is 33.25 kg/m².      Type 2 diabetes mellitus with hyperglycemia, without long-term current use of insulin ([facility])  Assessment &  Plan  Not currently on medical regimen for past 10 to 15 years.  Start basal prandial insulin therapy.  Monitor.  Diabetic diet placed.  Will likely need further education.

## 2020-08-01 NOTE — PROGRESS NOTES
2 RN skin check complete with GABBY Fried  Devices in place: PIV  Skin assessed under devices Yes  Confirmed pressure ulcers found on N/A  New potential pressure ulcers noted on N/A Wound consult placed Yes  The following interventions in place Pt is ambulatory and turns self in bed.     Diabetic foot ulcer noted on inner right 5th toe. Picture to be uploaded and WOC to be consulted.

## 2020-08-01 NOTE — PROGRESS NOTES
Daily Progress Note    Date of Service  8/1/2020    Chief Complaint  54 y.o. male admitted 7/31/2020 with wound to right 4-5 toes    Hospital Course  Patient has history of diabetes mellitus with very poor control and congenital single kidney presented with wound to 4-5th right toes.  He reports having kicked some furniture some time ago with his right foot, and over the next several days having increasing pain, as well as redness and then blue discoloration of his fourth and fifth toes.  He reports not doing much about this, however in the days just before admission, the pain and swelling and discoloration became much worse, prompting his visit to the emergency department. LPS was consulted, however, they do not come to [facility].   Interval Problem Update  8/1 Thoroughly examined wound which does appear to have an ulcer in between his 4th and 5th left toes.  Discussed DM management which patient is agreeable to working on.  Placed patient NPO and consulted Ortho, who requested MRI of foot and have patient tentatively scheduled for surgery this afternoon.  We also ordered vascular studies to rule out any vascular cause for this wound.  Labs and vitals reviewed. Blood sugars coming closer to normal with the administration of insulin. WBC elevated, ordered wound culture.    Consultants/Specialty  Ortho    Code Status  Full    Disposition  Home     Review of Systems  Review of Systems   Constitutional: Negative for chills, diaphoresis, fever, malaise/fatigue and weight loss.   HENT: Negative for congestion, ear discharge, ear pain, hearing loss, nosebleeds, sinus pain, sore throat and tinnitus.    Eyes: Positive for blurred vision. Negative for double vision, photophobia, pain, discharge and redness.   Respiratory: Negative for cough, hemoptysis, sputum production, shortness of breath, wheezing and stridor.    Cardiovascular: Positive for chest pain and palpitations. Negative for orthopnea.   Gastrointestinal: Negative for  abdominal pain, constipation, diarrhea, heartburn, nausea and vomiting.   Genitourinary: Negative for dysuria, frequency and urgency.   Musculoskeletal: Negative.    Skin: Negative for itching and rash.   Neurological: Negative.    Endo/Heme/Allergies: Negative.    Psychiatric/Behavioral: Negative.    All other systems reviewed and are negative.       Physical Exam  Temp:  [36.5 °C (97.7 °F)-36.7 °C (98.1 °F)] 36.7 °C (98.1 °F)  Pulse:  [] 92  Resp:  [18] 18  BP: (133-163)/(80-93) 163/80  SpO2:  [94 %-96 %] 95 %    Physical Exam  Vitals signs and nursing note reviewed. Exam conducted with a chaperone present.   Constitutional:       General: He is not in acute distress.     Appearance: Normal appearance. He is not ill-appearing.   HENT:      Head: Normocephalic and atraumatic.      Nose: Nose normal.      Mouth/Throat:      Mouth: Mucous membranes are moist.   Eyes:      Extraocular Movements: Extraocular movements intact.      Conjunctiva/sclera: Conjunctivae normal.      Pupils: Pupils are equal, round, and reactive to light.   Neck:      Musculoskeletal: Normal range of motion.   Cardiovascular:      Rate and Rhythm: Regular rhythm. Tachycardia present.      Pulses: Normal pulses.      Heart sounds: Normal heart sounds.   Pulmonary:      Effort: Pulmonary effort is normal.   Abdominal:      General: Bowel sounds are normal.      Palpations: Abdomen is soft.   Musculoskeletal:         General: Swelling and tenderness present.      Right lower leg: Edema present.        Feet:    Feet:      Right foot:      Skin integrity: Ulcer present.   Skin:     Capillary Refill: Capillary refill takes less than 2 seconds.      Findings: Erythema present.   Neurological:      General: No focal deficit present.      Mental Status: He is alert.   Psychiatric:         Mood and Affect: Mood normal.         Behavior: Behavior normal.         Fluids    Intake/Output Summary (Last 24 hours) at 8/1/2020 7882  Last data filed at  8/1/2020 0800  Gross per 24 hour   Intake 120 ml   Output --   Net 120 ml       Laboratory  Recent Labs     07/31/20  2226   WBC 13.3*   RBC 4.68*   HEMOGLOBIN 14.3   HEMATOCRIT 42.0   MCV 89.7   MCH 30.6   MCHC 34.0   RDW 42.2   PLATELETCT 271   MPV 10.1     Recent Labs     07/31/20  2226   SODIUM 135   POTASSIUM 4.6   CHLORIDE 104   CO2 23   GLUCOSE 340*   BUN 22   CREATININE 1.26   CALCIUM 8.2*                   Imaging  DX Foot- Right  7/31/2020 10:24 PM     HISTORY/REASON FOR EXAM: Atraumatic Pain/Swelling/Deformity; 4th and 5th digit wounds     TECHNIQUE/EXAM DESCRIPTION:  AP, lateral, and oblique views of the RIGHT foot.     COMPARISON:  None     FINDINGS:     The bony structures and articulations appear within normal limits without visualized fracture, subluxation, or dislocation.     IMPRESSION:        1.  No acute traumatic bony injury.    CT-FOOT W/O PLUS RECONS RIGHT     Assessment/Plan   Diabetic foot infection (HCC)  Assessment & Plan  With possible gangrene of the right fourth and fifth toes.  Plan for intravenous antibiotic therapy, limb preservation consultation.     Sepsis ([facility])  Assessment & Plan  This is Sepsis Present on admission  SIRS criteria identified on my evaluation include: Tachycardia, with heart rate greater than 90 BPM and Leukocytosis, with WBC greater than 12,000  Source is diabetic foot infection   Sepsis protocol initiated  Fluid resuscitation ordered per protocol  IV antibiotics as appropriate for source of sepsis  While organ dysfunction may be noted elsewhere in this problem list or in the chart, degree of organ dysfunction does not meet CMS criteria for severe sepsis              Class 1 obesity due to excess calories without serious comorbidity with body mass index (BMI) of 33.0 to 33.9 in adult  Assessment & Plan  Body mass index is 33.25 kg/m².   Started on ADA diet     Type 2 diabetes mellitus with hyperglycemia, without long-term current use of insulin (HCC)  Assessment &  Plan  Not currently on medical regimen for past 10 to 15 years.  Start basal prandial insulin therapy.  Monitor.  Diabetic diet placed.  Will likely need further education.    VTE prophylaxis: lovenox

## 2020-08-01 NOTE — PROGRESS NOTES
After [place] admission by Dr. Garcia    Hospital Course  Mr. Eugene is a 54-year-old male with a past medical history of uncontrolled diabetes mellitus and congenital single kidney who presented to the emergency department on 7/31/2020 with complaints of increasing pain to his right foot including discoloration to his fourth and fifth toes after he sustained some form of injury to it a few weeks prior.  He was diagnosed with a diabetic foot infection and sepsis and subsequently started on IV fluids, IV antibiotics, and admitted to the hospital for further evaluation and treatment.    Interval update  -Pain controlled.  Feeling good today.  Right fourth and fifth toes appear cyanotic.  Also noted on the inner side of his right fifth toe.  -ESR and CRP elevated.  -ABIs showed embolic ischemia to the right fourth and fifth toes.    Plan  -Orthopedic surgery consulted - Dr. Vásquez. Plan to monitor on IV antibiotics in attempt to avoid amputation.   -Started heparin drip.  -Diabetes education ordered.  -Cardiac monitoring ordered.  -Echocardiogram ordered.  -A1c in process.  Will continue fingersticks AC at bedtime with SSI as required.  -Wound culture in process.   -MRSA nasal swab ordered.   -Discontinued vanco since he only has 1 kidney. Will keep on IV ceftriaxone & PO flagyl.   -Will need close PCP follow up after discharge. Hospitalist RN made aware & will arrange on Monday.   -Anticipated discharge sometime later on this week depending on clinical course.         Electronically signed by:  Cordelia Rogers, MSN, RN, APRN, ACNPC-AG, CCRN  Nurse Practitioner, [facility]  Work # [phone]    8/1/2020    4:05 PM

## 2020-08-02 PROBLEM — I99.8 ISCHEMIA OF TOE: Status: ACTIVE | Noted: 2020-08-02

## 2020-08-02 PROBLEM — A41.9 SEPSIS (HCC): Status: RESOLVED | Noted: 2020-08-01 | Resolved: 2020-08-02

## 2020-08-02 LAB
ALBUMIN SERPL BCP-MCNC: 3.1 G/DL (ref 3.2–4.9)
ALBUMIN/GLOB SERPL: 0.9 G/DL
ALP SERPL-CCNC: 69 U/L (ref 30–99)
ALT SERPL-CCNC: 13 U/L (ref 2–50)
ANION GAP SERPL CALC-SCNC: 8 MMOL/L (ref 7–16)
APTT PPP: 47.4 SEC (ref 24.7–36)
APTT PPP: 61.5 SEC (ref 24.7–36)
APTT PPP: 62.5 SEC (ref 24.7–36)
AST SERPL-CCNC: 16 U/L (ref 12–45)
BASOPHILS # BLD AUTO: 1 % (ref 0–1.8)
BASOPHILS # BLD: 0.11 K/UL (ref 0–0.12)
BILIRUB SERPL-MCNC: 0.3 MG/DL (ref 0.1–1.5)
BUN SERPL-MCNC: 11 MG/DL (ref 8–22)
CALCIUM SERPL-MCNC: 8.8 MG/DL (ref 8.4–10.2)
CHLORIDE SERPL-SCNC: 104 MMOL/L (ref 96–112)
CO2 SERPL-SCNC: 24 MMOL/L (ref 20–33)
CREAT SERPL-MCNC: 0.83 MG/DL (ref 0.5–1.4)
EOSINOPHIL # BLD AUTO: 0.26 K/UL (ref 0–0.51)
EOSINOPHIL NFR BLD: 2.3 % (ref 0–6.9)
ERYTHROCYTE [DISTWIDTH] IN BLOOD BY AUTOMATED COUNT: 41.1 FL (ref 35.9–50)
GLOBULIN SER CALC-MCNC: 3.4 G/DL (ref 1.9–3.5)
GLUCOSE BLD-MCNC: 108 MG/DL (ref 65–99)
GLUCOSE BLD-MCNC: 117 MG/DL (ref 65–99)
GLUCOSE BLD-MCNC: 119 MG/DL (ref 65–99)
GLUCOSE BLD-MCNC: 123 MG/DL (ref 65–99)
GLUCOSE BLD-MCNC: 163 MG/DL (ref 65–99)
GLUCOSE BLD-MCNC: 99 MG/DL (ref 65–99)
GLUCOSE SERPL-MCNC: 137 MG/DL (ref 65–99)
GRAM STN SPEC: NORMAL
HCT VFR BLD AUTO: 44.6 % (ref 42–52)
HGB BLD-MCNC: 15.1 G/DL (ref 14–18)
IMM GRANULOCYTES # BLD AUTO: 0.03 K/UL (ref 0–0.11)
IMM GRANULOCYTES NFR BLD AUTO: 0.3 % (ref 0–0.9)
LYMPHOCYTES # BLD AUTO: 2 K/UL (ref 1–4.8)
LYMPHOCYTES NFR BLD: 17.7 % (ref 22–41)
MCH RBC QN AUTO: 30.4 PG (ref 27–33)
MCHC RBC AUTO-ENTMCNC: 33.9 G/DL (ref 33.7–35.3)
MCV RBC AUTO: 89.9 FL (ref 81.4–97.8)
MONOCYTES # BLD AUTO: 1.03 K/UL (ref 0–0.85)
MONOCYTES NFR BLD AUTO: 9.1 % (ref 0–13.4)
NEUTROPHILS # BLD AUTO: 7.9 K/UL (ref 1.82–7.42)
NEUTROPHILS NFR BLD: 69.6 % (ref 44–72)
NRBC # BLD AUTO: 0 K/UL
NRBC BLD-RTO: 0 /100 WBC
PLATELET # BLD AUTO: 287 K/UL (ref 164–446)
PMV BLD AUTO: 9.8 FL (ref 9–12.9)
POTASSIUM SERPL-SCNC: 4.7 MMOL/L (ref 3.6–5.5)
PROT SERPL-MCNC: 6.5 G/DL (ref 6–8.2)
RBC # BLD AUTO: 4.96 M/UL (ref 4.7–6.1)
SCCMEC + MECA PNL NOSE NAA+PROBE: NEGATIVE
SCCMEC + MECA PNL NOSE NAA+PROBE: POSITIVE
SIGNIFICANT IND 70042: NORMAL
SITE SITE: NORMAL
SODIUM SERPL-SCNC: 136 MMOL/L (ref 135–145)
SOURCE SOURCE: NORMAL
WBC # BLD AUTO: 11.3 K/UL (ref 4.8–10.8)

## 2020-08-02 PROCEDURE — 85025 COMPLETE CBC W/AUTO DIFF WBC: CPT

## 2020-08-02 PROCEDURE — 80053 COMPREHEN METABOLIC PANEL: CPT

## 2020-08-02 PROCEDURE — 700102 HCHG RX REV CODE 250 W/ 637 OVERRIDE(OP): Performed by: NURSE PRACTITIONER

## 2020-08-02 PROCEDURE — 99233 SBSQ HOSP IP/OBS HIGH 50: CPT | Performed by: INTERNAL MEDICINE

## 2020-08-02 PROCEDURE — 700102 HCHG RX REV CODE 250 W/ 637 OVERRIDE(OP): Performed by: HOSPITALIST

## 2020-08-02 PROCEDURE — A9270 NON-COVERED ITEM OR SERVICE: HCPCS | Performed by: HOSPITALIST

## 2020-08-02 PROCEDURE — 700111 HCHG RX REV CODE 636 W/ 250 OVERRIDE (IP): Performed by: HOSPITALIST

## 2020-08-02 PROCEDURE — 82962 GLUCOSE BLOOD TEST: CPT | Mod: 91

## 2020-08-02 PROCEDURE — 700111 HCHG RX REV CODE 636 W/ 250 OVERRIDE (IP): Performed by: NURSE PRACTITIONER

## 2020-08-02 PROCEDURE — 85730 THROMBOPLASTIN TIME PARTIAL: CPT | Mod: 91

## 2020-08-02 PROCEDURE — A9270 NON-COVERED ITEM OR SERVICE: HCPCS | Performed by: INTERNAL MEDICINE

## 2020-08-02 PROCEDURE — 700102 HCHG RX REV CODE 250 W/ 637 OVERRIDE(OP): Performed by: INTERNAL MEDICINE

## 2020-08-02 PROCEDURE — 700105 HCHG RX REV CODE 258: Performed by: HOSPITALIST

## 2020-08-02 PROCEDURE — A9270 NON-COVERED ITEM OR SERVICE: HCPCS | Performed by: NURSE PRACTITIONER

## 2020-08-02 PROCEDURE — 770020 HCHG ROOM/CARE - TELE (206)

## 2020-08-02 RX ORDER — LISINOPRIL 5 MG/1
5 TABLET ORAL
Status: DISCONTINUED | OUTPATIENT
Start: 2020-08-02 | End: 2020-08-10 | Stop reason: HOSPADM

## 2020-08-02 RX ADMIN — METRONIDAZOLE 500 MG: 500 TABLET ORAL at 06:21

## 2020-08-02 RX ADMIN — LISINOPRIL 5 MG: 5 TABLET ORAL at 09:41

## 2020-08-02 RX ADMIN — HEPARIN SODIUM 3200 UNITS: 1000 INJECTION, SOLUTION INTRAVENOUS; SUBCUTANEOUS at 11:48

## 2020-08-02 RX ADMIN — CLONIDINE HYDROCHLORIDE 0.1 MG: 0.1 TABLET ORAL at 03:55

## 2020-08-02 RX ADMIN — INSULIN GLARGINE 20 UNITS: 100 INJECTION, SOLUTION SUBCUTANEOUS at 17:16

## 2020-08-02 RX ADMIN — INSULIN LISPRO 1 UNITS: 100 INJECTION, SOLUTION INTRAVENOUS; SUBCUTANEOUS at 21:31

## 2020-08-02 RX ADMIN — ACETAMINOPHEN 650 MG: 325 TABLET, FILM COATED ORAL at 06:21

## 2020-08-02 RX ADMIN — HEPARIN SODIUM 1450 UNITS/HR: 5000 INJECTION, SOLUTION INTRAVENOUS at 10:50

## 2020-08-02 RX ADMIN — CEFTRIAXONE SODIUM 2 G: 2 INJECTION, POWDER, FOR SOLUTION INTRAMUSCULAR; INTRAVENOUS at 06:23

## 2020-08-02 RX ADMIN — INSULIN LISPRO 7 UNITS: 100 INJECTION, SOLUTION INTRAVENOUS; SUBCUTANEOUS at 07:37

## 2020-08-02 RX ADMIN — ACETAMINOPHEN 650 MG: 325 TABLET, FILM COATED ORAL at 19:29

## 2020-08-02 RX ADMIN — METRONIDAZOLE 500 MG: 500 TABLET ORAL at 15:10

## 2020-08-02 RX ADMIN — METRONIDAZOLE 500 MG: 500 TABLET ORAL at 21:26

## 2020-08-02 ASSESSMENT — ENCOUNTER SYMPTOMS
CLAUDICATION: 1
HEADACHES: 0
NERVOUS/ANXIOUS: 0
SPEECH CHANGE: 0
CONSTIPATION: 0
BLOOD IN STOOL: 0
SINUS PAIN: 0
PALPITATIONS: 0
EYE PAIN: 0
MUSCULOSKELETAL NEGATIVE: 1
NAUSEA: 0
DIZZINESS: 0
DIARRHEA: 1
BLURRED VISION: 0
INSOMNIA: 0
ABDOMINAL PAIN: 0
PSYCHIATRIC NEGATIVE: 1
SENSORY CHANGE: 0
COUGH: 0
DIAPHORESIS: 1
FOCAL WEAKNESS: 0
FEVER: 0
ORTHOPNEA: 0
HEARTBURN: 0
DEPRESSION: 0
SHORTNESS OF BREATH: 0
WHEEZING: 0
TINGLING: 0
WEAKNESS: 0
VOMITING: 0
CHILLS: 0

## 2020-08-02 ASSESSMENT — FIBROSIS 4 INDEX: FIB4 SCORE: 0.83

## 2020-08-02 NOTE — CONSULTS
Orthopaedic Surgery Consult Note:    Liban Mallory M.D.  Date & Time note created:    8/1/2020   7:25 PM     Referring MD:  Dr. Kaminski    Patient ID:   Name:             Mc Eugene     YOB: 1965  Age:                 54 y.o.  male   MRN:               [id number]                                                             Reason for Consult:      Right 4th and 5th toe infection    History of Present Illness:    Mr. Eugene is a pleasant gentleman who presented to [facility] ER last night for worsening right 4th and 5th toe pain as well vee increasing redness.  He states his symptoms began roughly one month ago when he began having pain.  He believes there may have been some type of trauma but unsure what it was.  He denies fevers or chills at home.  Of note, he does have DM.    Review of Systems:      Constitutional: Denies fevers, Denies weight changes  Eyes: Denies changes in vision, no eye pain  Ears/Nose/Throat/Mouth: Denies nasal congestion or sore throat   Cardiovascular: Denies chest pain   Respiratory: Denies shortness of breath , Denies cough  Gastrointestinal/Hepatic: Denies abdominal pain, nausea, vomiting, diarrhea, constipation or GI bleeding   Genitourinary: Denies dysuria or frequency  Musculoskeletal/Rheum: Right toe pain  Skin: Denies rash  Neurological: Denies headache, confusion, memory loss or focal weakness/parasthesias  Psychiatric: denies mood disorder   Endocrine: Estella thyroid problems  Heme/Oncology/Lymph Nodes: Denies enlarged lymph nodes, denies brusing or known bleeding disorder  All other systems were reviewed and are negative (AMA/CMS criteria)                Past Medical History:   Past Medical History:   Diagnosis Date   • Diabetes (HCC)    • Kidney anomaly, congenital     born with 1 kidney     Active Hospital Problems    Diagnosis   • Type 2 diabetes mellitus with hyperglycemia, without long-term current use of insulin (HCC) [E11.65]   • Diabetic foot infection  ([facility]) [E11.628, L08.9]   • Class 1 obesity due to excess calories without serious comorbidity with body mass index (BMI) of 33.0 to 33.9 in adult [E66.09, Z68.33]   • Sepsis (HCC) [A41.9]       Past Surgical History:  Past Surgical History:   Procedure Laterality Date   • APPENDECTOMY         Hospital Medications:    Current Facility-Administered Medications:   •  acetaminophen (TYLENOL) tablet 650 mg, 650 mg, Oral, Q6HRS PRN, Andrew Garcia M.D., 650 mg at 08/01/20 1518  •  Notify provider if pain remains uncontrolled, , , CONTINUOUS **AND** Use the numeric rating scale (NRS-11) on regular floors and Critical-Care Pain Observation Tool (CPOT) on ICUs/Trauma to assess pain, , , CONTINUOUS **AND** Pulse Ox (Oximetry), , , CONTINUOUS **AND** Pharmacy Consult Request ...Pain Management Review 1 Each, 1 Each, Other, PHARMACY TO DOSE **AND** If patient difficult to arouse and/or has respiratory depression, stop any opiates that are currently infusing and call a Rapid Response., , , CONTINUOUS **AND** oxyCODONE immediate-release (ROXICODONE) tablet 2.5 mg, 2.5 mg, Oral, Q3HRS PRN **AND** oxyCODONE immediate-release (ROXICODONE) tablet 5 mg, 5 mg, Oral, Q3HRS PRN **AND** morphine (pf) 4 MG/ML injection 2 mg, 2 mg, Intravenous, Q3HRS PRN, Andrew Garcia M.D.  •  cloNIDine (CATAPRES) tablet 0.1 mg, 0.1 mg, Oral, Q6HRS PRN, Andrew Garcia M.D.  •  ondansetron (ZOFRAN) syringe/vial injection 4 mg, 4 mg, Intravenous, Q4HRS PRN, Andrew Garcia M.D.  •  ondansetron (ZOFRAN ODT) dispertab 4 mg, 4 mg, Oral, Q4HRS PRN, Andrew Garcia M.D.  •  promethazine (PHENERGAN) tablet 12.5-25 mg, 12.5-25 mg, Oral, Q4HRS PRN, Andrew Garcia M.D.  •  promethazine (PHENERGAN) suppository 12.5-25 mg, 12.5-25 mg, Rectal, Q4HRS PRN, Andrew Garcia M.D.  •  prochlorperazine (COMPAZINE) injection 5-10 mg, 5-10 mg, Intravenous, Q4HRS PRN, Andrew Garcia M.D.  •  senna-docusate (PERICOLACE or SENOKOT S) 8.6-50 MG per tablet 2 Tab, 2 Tab, Oral,  BID **AND** polyethylene glycol/lytes (MIRALAX) PACKET 1 Packet, 1 Packet, Oral, QDAY PRN **AND** magnesium hydroxide (MILK OF MAGNESIA) suspension 30 mL, 30 mL, Oral, QDAY PRN **AND** bisacodyl (DULCOLAX) suppository 10 mg, 10 mg, Rectal, QDAY PRN, Andrew Garcia M.D.  •  insulin glargine (Lantus) injection, 0.2 Units/kg/day, Subcutaneous, Q EVENING, 20 Units at 08/01/20 1803 **AND** insulin lispro (HumaLOG) injection, 0.2 Units/kg/day, Subcutaneous, TID AC, 7 Units at 08/01/20 1758 **AND** insulin lispro (HumaLOG) injection, 1-6 Units, Subcutaneous, 4X/DAY ACHS, Stopped at 08/01/20 1100 **AND** POC Blood Glucose, , , Q AC AND BEDTIME(S) **AND** NOTIFY MD and PharmD, , , Once **AND** glucose 4 g chewable tablet 16 g, 16 g, Oral, Q15 MIN PRN **AND** dextrose 50% (D50W) injection 50 mL, 50 mL, Intravenous, Q15 MIN PRN, Andrew Garcia M.D.  •  lactated ringers infusion (BOLUS): BMI greater than 30, 30 mL/kg (Ideal), Intravenous, Once PRN, Andrew Garcia M.D.  •  cefTRIAXone (ROCEPHIN) 2 g in  mL IVPB, 2 g, Intravenous, Q24HRS, Andrew Garcia M.D., Stopped at 08/01/20 0215  •  metroNIDAZOLE (FLAGYL) tablet 500 mg, 500 mg, Oral, Q8HRS, Edith Kaminski D.O., 500 mg at 08/01/20 1511  •  [COMPLETED] heparin injection 6,000 Units, 6,000 Units, Intravenous, Once, 6,000 Units at 08/01/20 1530 **AND** heparin injection 3,200 Units, 3,200 Units, Intravenous, PRN **AND** heparin infusion 25,000 units in 500 mL 0.45% NACL, , Intravenous, Continuous, Last Rate: 24 mL/hr at 08/01/20 1531, 1,200 Units/hr at 08/01/20 1531 **AND** Protocol 440 Heparin Weight Based DO NOT GIVE ANY HEPARIN BOLUS TO STROKE PATIENT, , , CONTINUOUS **AND** Protocol 440 Heparin Weight Based Discontinue Enoxaparin (Lovenox), Dabigatran (Pradaxa), Rivaroxaban (Xarelto), Apixaban (Eliquis), Edoxaban (Savaysa, Lixiana), Fondaparinux (Arixtra) and Argatroban prior to heparin administration, , , CONTINUOUS **AND** Protocol 440 Heparin Weight Based  Draw baseline aPTT, PT, and platelet count if not already done, , , CONTINUOUS **AND** Protocol 440 Heparin Weight Based Draw aPTT 6 hours after beginning infusion. , , , CONTINUOUS **AND** Protocol 440 Heparin Weight Based Draw Platelet count every three days. Contact MD if platelet is 50% lower than baseline count., , , CONTINUOUS **AND** Protocol 440 Heparin Weight Based Record Patient Data, , , CONTINUOUS **AND** Protocol 440 Heparin Weight Based INSTRUCTIONS, , , CONTINUOUS **AND** Protocol 440 Heparin Weight Based Review aPTT results 6 hours after infusion is begun as detailed, , , CONTINUOUS **AND** Protocol 440 Heparin Weight Based Adjust heparin to maintain aPTT between 55-96 sec, , , CONTINUOUS **AND** Protocol 440 Heparin Weight Based Order aPTT 6 hours after any rate change or hold until aPTT is therapeutic (55-96 seconds), , , CONTINUOUS **AND** Protocol 440 Heparin Weight Based Documentation and verification, , , CONTINUOUS, Cordelia Rogers A.P.R.N.    Current Outpatient Medications:  No medications prior to admission.       Medication Allergy:  Allergies   Allergen Reactions   • Pcn [Penicillins] Hives       Family History:  Family History   Problem Relation Age of Onset   • Lung Disease Mother    • Cancer Mother        Social History:  Social History     Socioeconomic History   • Marital status:      Spouse name: Not on file   • Number of children: Not on file   • Years of education: Not on file   • Highest education level: Not on file   Occupational History   • Not on file   Social Needs   • Financial resource strain: Not on file   • Food insecurity     Worry: Not on file     Inability: Not on file   • Transportation needs     Medical: Not on file     Non-medical: Not on file   Tobacco Use   • Smoking status: Former Smoker   • Smokeless tobacco: Never Used   Substance and Sexual Activity   • Alcohol use: Not Currently   • Drug use: Yes     Comment: marijuana vape   • Sexual activity: Not  "on file   Lifestyle   • Physical activity     Days per week: Not on file     Minutes per session: Not on file   • Stress: Not on file   Relationships   • Social connections     Talks on phone: Not on file     Gets together: Not on file     Attends Christian service: Not on file     Active member of club or organization: Not on file     Attends meetings of clubs or organizations: Not on file     Relationship status: Not on file   • Intimate partner violence     Fear of current or ex partner: Not on file     Emotionally abused: Not on file     Physically abused: Not on file     Forced sexual activity: Not on file   Other Topics Concern   • Not on file   Social History Narrative   • Not on file         Physical Exam:  Vitals/ General Appearance:   Weight/BMI: Body mass index is 33.25 kg/m².  BP (!) 161/86   Pulse 94   Temp 36.2 °C (97.2 °F) (Oral)   Resp 18   Ht 1.727 m (5' 8\")   Wt 99.2 kg (218 lb 11.1 oz)   SpO2 92%   Vitals:    07/31/20 2340 08/01/20 0105 08/01/20 0800 08/01/20 1600   BP: 133/84 146/88 (!) 163/80 (!) 161/86   Pulse: 91 86 92 94   Resp: 18 18 18 18   Temp:  36.7 °C (98 °F) 36.7 °C (98.1 °F) 36.2 °C (97.2 °F)   TempSrc:  Oral Oral Oral   SpO2: 95% 94% 95% 92%   Weight:  99.2 kg (218 lb 11.1 oz)     Height:  1.727 m (5' 8\")         Constitutional:   Well developed, Well nourished, No acute distress  HENMT:  Normocephalic, Atraumatic, Oropharynx moist mucous membranes, No oral exudates, Nose normal.  No thyromegaly.  Eyes:  EOMI, Conjunctiva normal, No discharge.  Neck:  Normal range of motion, No cervical tenderness,  no JVD.  Cardiovascular:  Regular rate and rhythm  Lungs:  Normal breathing  Abdomen: Soft, non-tender, non-distended.  Skin: Warm, Dry, No erythema, No rash, no induration.  Neurologic: Alert & oriented x 3, No focal deficits noted, cranial nerves II through X are grossly intact.  Psychiatric: Affect normal, Judgment normal, Mood normal.  Musculoskeletal: Exam of the right foot " reveals erythema and edema about the 4th and 5th toes, more so in the 5th toe.  There is a small wound over the medial aspect of the 5th toe without purulent drainage.  There is no fluctuance in the area.  There is mild TTP about the 5th and 4th toes.  The 4th toe does not have any wound or skin breakdown.  The capillary refill of the 5th toe is sluggish but is roughly 3 sec and definitely is not absent.  Capillary refill of the 4th toe is closer to 2 sec.    Lab Data Review:  Recent Results (from the past 24 hour(s))   ACCU-CHEK GLUCOSE    Collection Time: 07/31/20 10:20 PM   Result Value Ref Range    Glucose - Accu-Ck 305 (H) 65 - 99 mg/dL   CBC WITH DIFFERENTIAL    Collection Time: 07/31/20 10:26 PM   Result Value Ref Range    WBC 13.3 (H) 4.8 - 10.8 K/uL    RBC 4.68 (L) 4.70 - 6.10 M/uL    Hemoglobin 14.3 14.0 - 18.0 g/dL    Hematocrit 42.0 42.0 - 52.0 %    MCV 89.7 81.4 - 97.8 fL    MCH 30.6 27.0 - 33.0 pg    MCHC 34.0 33.7 - 35.3 g/dL    RDW 42.2 35.9 - 50.0 fL    Platelet Count 271 164 - 446 K/uL    MPV 10.1 9.0 - 12.9 fL    Neutrophils-Polys 63.60 44.00 - 72.00 %    Lymphocytes 22.30 22.00 - 41.00 %    Monocytes 10.70 0.00 - 13.40 %    Eosinophils 2.30 0.00 - 6.90 %    Basophils 0.70 0.00 - 1.80 %    Immature Granulocytes 0.40 0.00 - 0.90 %    Nucleated RBC 0.00 /100 WBC    Neutrophils (Absolute) 8.50 (H) 1.82 - 7.42 K/uL    Lymphs (Absolute) 2.97 1.00 - 4.80 K/uL    Monos (Absolute) 1.42 (H) 0.00 - 0.85 K/uL    Eos (Absolute) 0.30 0.00 - 0.51 K/uL    Baso (Absolute) 0.09 0.00 - 0.12 K/uL    Immature Granulocytes (abs) 0.05 0.00 - 0.11 K/uL    NRBC (Absolute) 0.00 K/uL   Comp Metabolic Panel    Collection Time: 07/31/20 10:26 PM   Result Value Ref Range    Sodium 135 135 - 145 mmol/L    Potassium 4.6 3.6 - 5.5 mmol/L    Chloride 104 96 - 112 mmol/L    Co2 23 20 - 33 mmol/L    Anion Gap 8.0 7.0 - 16.0    Glucose 340 (H) 65 - 99 mg/dL    Bun 22 8 - 22 mg/dL    Creatinine 1.26 0.50 - 1.40 mg/dL    Calcium 8.2  (L) 8.4 - 10.2 mg/dL    AST(SGOT) 15 12 - 45 U/L    ALT(SGPT) 14 2 - 50 U/L    Alkaline Phosphatase 65 30 - 99 U/L    Total Bilirubin 0.2 0.1 - 1.5 mg/dL    Albumin 3.3 3.2 - 4.9 g/dL    Total Protein 6.6 6.0 - 8.2 g/dL    Globulin 3.3 1.9 - 3.5 g/dL    A-G Ratio 1.0 g/dL   ESTIMATED GFR    Collection Time: 07/31/20 10:26 PM   Result Value Ref Range    GFR If African American >60 >60 mL/min/1.73 m 2    GFR If Non African American 59 (A) >60 mL/min/1.73 m 2   BLOOD CULTURE x2    Collection Time: 07/31/20 10:29 PM    Specimen: Peripheral; Blood   Result Value Ref Range    Significant Indicator NEG     Source BLD     Site PERIPHERAL     Culture Result       No Growth  Note: Blood cultures are incubated for 5 days and  are monitored continuously.Positive blood cultures  are called to the RN and reported as soon as  they are identified.  Blood culture testing and Gram stain, if indicated, are  performed at [facility], [address] [place] [address].  Positive blood cultures are  sent to [facility], [address] [place] [address], for organism identification and  susceptibility testing.     Routine (COVID/SARS COV-2 In-House PCR up to 24 hours)    Collection Time: 08/01/20 12:30 AM    Specimen: Nasopharyngeal; Respirate   Result Value Ref Range    COVID Order Status Received    SARS-CoV-2, PCR (In-House)    Collection Time: 08/01/20 12:30 AM   Result Value Ref Range    SARS-CoV-2 Source NP Swab     SARS-CoV-2 by PCR NotDetected    Lactic Acid -STAT Once    Collection Time: 08/01/20  1:34 AM   Result Value Ref Range    Lactic Acid 1.0 0.5 - 2.0 mmol/L   Lactic Acid Every four hours after STAT order    Collection Time: 08/01/20  5:24 AM   Result Value Ref Range    Lactic Acid 1.2 0.5 - 2.0 mmol/L   CRP QUANTITIVE (NON-CARDIAC)    Collection Time: 08/01/20  5:24 AM   Result Value Ref Range    Stat C-Reactive Protein 6.23 (H) 0.00 - 0.75 mg/dL   Sed Rate    Collection Time:  08/01/20  5:24 AM   Result Value Ref Range    Sed Rate Westergren 45 (H) 0 - 20 mm/hour   HEMOGLOBIN A1C    Collection Time: 08/01/20  5:24 AM   Result Value Ref Range    Glycohemoglobin 10.4 (H) 0.0 - 5.6 %    Est Avg Glucose 252 mg/dL   ACCU-CHEK GLUCOSE    Collection Time: 08/01/20  6:08 AM   Result Value Ref Range    Glucose - Accu-Ck 168 (H) 65 - 99 mg/dL   EC-ECHOCARDIOGRAM COMPLETE W/ CONT    Collection Time: 08/01/20  2:55 PM   Result Value Ref Range    Eject.Frac. MOD BP 57.67     Eject.Frac. MOD 4C 58.54     Eject.Frac. MOD 2C 56.2     Left Ventrical Ejection Fraction 60    APTT    Collection Time: 08/01/20  3:16 PM   Result Value Ref Range    APTT 28.7 24.7 - 36.0 sec       Imaging: X-ray, CT and ABIs were reviewed.  No abscess or osteomyelitis was noted.  ABIs show decreased flow to the 4th and 5th toes.    Assessment: Right 4th and 5th toe ischemia vs infection    Plan: IV abx and heparin for now to help improve blood flow and treat infection  Wound care for the toes  WBAT RLE  At this time I would like to wait and see if the toes improve or begin to demarcate.  If they worsen then it would be best to await for demarcation before definitive surgery.  I will return in a few days to see how the toes are improving.  Please call with any questions.

## 2020-08-02 NOTE — ASSESSMENT & PLAN NOTE
-Embolic per AGUSTINA report. Echo showed no cardiac source of emboli.   -Continue heparin drip. Monitor clinical progression.   -Orthopedic surgery following. They are hopeful that IV anticoagulation & antibiotics will help salvage his toes and prevent the need for amputation.

## 2020-08-02 NOTE — CARE PLAN
Problem: Communication  Goal: The ability to communicate needs accurately and effectively will improve  Outcome: PROGRESSING AS EXPECTED  Intervention: [place] patient and significant other/support system to call light to alert staff of needs  Flowsheets (Taken 8/2/2020 0117)  Oriented to:: All of the Following : Location of Bathroom, Visiting Policy, Unit Routine, Call Light and Bedside Controls, Bedside Rail Policy, Smoking Policy, Rights and Responsibilities, Bedside Report, and Patient Education Notebook  Intervention: Reorient patient to environment as needed  Flowsheets (Taken 8/2/2020 0117)  Oriented to:: All of the Following : Location of Bathroom, Visiting Policy, Unit Routine, Call Light and Bedside Controls, Bedside Rail Policy, Smoking Policy, Rights and Responsibilities, Bedside Report, and Patient Education Notebook  Intervention: Educate patient and significant other/support system about the plan of care, procedures, treatments, medications and allow for questions  Flowsheets (Taken 8/2/2020 0117)  Pt & Family Have Been Educated on Methods Available to Report Concerns Related to Care, Treatment, Services, and Patient Safety Issues: Yes  Intervention: Use communication aids and/or /Language Line as appropriate  Flowsheets (Taken 8/2/2020 0117)  Does Pt Need Special Equipment for the Hearing Impaired?: No     Problem: Safety  Goal: Will remain free from injury  Outcome: PROGRESSING AS EXPECTED  Intervention: Provide assistance with mobility  Flowsheets (Taken 8/2/2020 0000 by Tyffany M Collette, C.N.A.)  Assistance: No Assistance Required  Ambulation Tolerance: Tolerates Well  Intervention: Collaborate with Interdisciplinary Team for safe transfer and mobilization techniques  Flowsheets (Taken 8/2/2020 0000 by Tyffany M Collette, C.N.A.)  Assistive Devices: None  Goal: Will remain free from falls  Outcome: PROGRESSING AS EXPECTED  Intervention: Assess risk factors for  falls  Flowsheets  Taken 8/2/2020 0000 by Tyffany M Collette, C.N.A.  Pt Calls for Assistance: No assistance required  Taken 8/2/2020 0117 by Willie Denise R.N.  History of fall: 0  Intervention: Implement fall precautions  Flowsheets (Taken 8/1/2020 2000)  Environmental Precautions:   Treaded Slipper Socks on Patient   Personal Belongings, Wastebasket, Call Bell etc. in Easy Reach   Transferred to Stronger Side   Report Given to Other Health Care Providers Regarding Fall Risk   Bed in Low Position   Communication Sign for Patients & Families   Mobility Assessed & Appropriate Sign Placed

## 2020-08-02 NOTE — CARE PLAN
Problem: Safety  Goal: Will remain free from injury  Outcome: PROGRESSING AS EXPECTED  Note: Patient will remain free from falls/injuries this shift. Safety measures in place. Steady on feet.      Problem: Knowledge Deficit  Goal: Knowledge of disease process/condition, treatment plan, diagnostic tests, and medications will improve  Outcome: PROGRESSING AS EXPECTED  Note: Patient will be cooperative with cares this shift. Plan of care discussed with patient. Questions answered, verbalizes understanding.

## 2020-08-02 NOTE — PROGRESS NOTES
Received shift handoff report from Leidy PIERRE. Pt is in bed and stable. Bed alarm is on. Bed locked and in lowest position, upper side rails up, call light in reach, whiteboard updated. POC discussed and pt verbalizes understanding.

## 2020-08-02 NOTE — NON-PROVIDER
Daily Progress Note     Date of Service  8/2/2020     Chief Complaint  54 y.o. male admitted 7/31/2020 with right 4/5 toe wound.     Hospital Course  Patient has history of diabetes mellitus with very poor control and congenital single kidney presented with wound to 4-5th right toes.  He reports having kicked some furniture some time ago with his right foot, and over the next several days having increasing pain, as well as redness and then blue discoloration of his fourth and fifth toes.  He reports not doing much about this, however in the days just before admission, the pain and swelling and discoloration became much worse, prompting his visit to the emergency department. LPS was consulted, however, they do not come to [facility].      Interval Problem Update  8/1 Thoroughly examined wound which does appear to have an ulcer in between his 4th and 5th left toes.  Discussed DM management which patient is agreeable to working on.  Placed patient NPO and consulted Ortho, who requested CT of foot and have patient tentatively scheduled for surgery this afternoon.  Upon review of CT, ortho chose to hold off on surgery and start heparin drip in hopes to salvage the toe.  We also ordered vascular studies to rule out any vascular cause for this wound. AGUSTINA studies reveal that right 4-5 toes have wave forms consistent with severe ischemia, likely embolic in nature, echo ordered, tele monitoring ordered. Labs and vitals reviewed. Blood sugars coming closer to normal with the administration of insulin. WBC elevated, ordered wound culture.     8/2 states wound is feeling better and he can feel that it is healing.  4th right toe is more vascularized and less blue, however cap refill is still >5 seconds on both 4/5 right toes.  Hep gtt continues.  WBC count improved, now 11.3. Echo WDL, tele monitoring NSR. Hbg A1C back at 10.4, blood sugars are now WDL, changed insulin to sliding scale coverage without nutritional  coverage.     Consultants/Specialty  Ortho- Dr. Mallory     Code Status  full     Disposition  home     Review of Systems  Review of Systems   Constitutional: Positive for diaphoresis. Negative for chills and fever.   HENT: Negative for hearing loss, sinus pain and tinnitus.    Eyes: Negative for blurred vision and pain.   Respiratory: Negative for cough and shortness of breath.    Cardiovascular: Positive for claudication. Negative for chest pain, palpitations and orthopnea.   Gastrointestinal: Negative for abdominal pain and heartburn.   Genitourinary: Negative for dysuria, frequency and urgency.   Musculoskeletal: Negative.    Skin: Negative for itching and rash.   Neurological: Negative for dizziness, tingling and headaches.   Psychiatric/Behavioral: Negative.          Physical Exam  Temp:  [36.2 °C (97.2 °F)-36.7 °C (98.1 °F)] 36.3 °C (97.4 °F)  Pulse:  [87-98] 98  Resp:  [17-18] 18  BP: (141-178)/() 141/76  SpO2:  [91 %-100 %] 93 %     Physical Exam  Vitals signs and nursing note reviewed. Exam conducted with a chaperone present.   Constitutional:       General: He is not in acute distress.     Appearance: Normal appearance. He is not ill-appearing.   HENT:      Head: Normocephalic and atraumatic.      Nose: Nose normal.      Mouth/Throat:      Mouth: Mucous membranes are moist.   Eyes:      Extraocular Movements: Extraocular movements intact.      Conjunctiva/sclera: Conjunctivae normal.      Pupils: Pupils are equal, round, and reactive to light.   Neck:      Musculoskeletal: Normal range of motion and neck supple.   Cardiovascular:      Rate and Rhythm: Normal rate and regular rhythm.      Pulses: Normal pulses.      Heart sounds: Normal heart sounds.   Pulmonary:      Effort: Pulmonary effort is normal. No respiratory distress.      Breath sounds: Normal breath sounds. No stridor. No wheezing, rhonchi or rales.   Chest:      Chest wall: No tenderness.   Abdominal:      General: Bowel sounds are  normal. There is no distension.      Palpations: Abdomen is soft.      Tenderness: There is no abdominal tenderness. There is no guarding.   Musculoskeletal: Normal range of motion.   Feet:      Right foot:      Skin integrity: Ulcer present.   Skin:     General: Skin is warm and dry.      Capillary Refill: Capillary refill takes more than 3 seconds. Right 4/5 toes  Neurological:      General: No focal deficit present.      Mental Status: He is alert and oriented to person, place, and time.   Psychiatric:         Mood and Affect: Mood normal.         Behavior: Behavior normal.         Thought Content: Thought content normal.         Judgment: Judgment normal.            Fluids    Intake/Output Summary (Last 24 hours) at 8/2/2020 1303  Last data filed at 8/2/2020 1157      Gross per 24 hour   Intake 1381.19 ml   Output --   Net 1381.19 ml        Laboratory       Recent Labs     07/31/20 2226 08/02/20  0455   WBC 13.3* 11.3*   RBC 4.68* 4.96   HEMOGLOBIN 14.3 15.1   HEMATOCRIT 42.0 44.6   MCV 89.7 89.9   MCH 30.6 30.4   MCHC 34.0 33.9   RDW 42.2 41.1   PLATELETCT 271 287   MPV 10.1 9.8          Recent Labs     07/31/20  2226 08/02/20  0455   SODIUM 135 136   POTASSIUM 4.6 4.7   CHLORIDE 104 104   CO2 23 24   GLUCOSE 340* 137*   BUN 22 11   CREATININE 1.26 0.83   CALCIUM 8.2* 8.8           Recent Labs     08/01/20  2146 08/02/20  0455 08/02/20  1102   APTT 41.7* 62.5* 47.4*                Imaging  CT-FOOT W/O PLUS RECONS RIGHT         8/1/2020 11:47 AM     HISTORY/REASON FOR EXAM:  Right foot pain. Diabetic foot ulcer. Evaluate for osteomyelitis.        TECHNIQUE/ EXAM DESCRIPTION:  CT scan of the RIGHT ankle/foot without contrast, with reconstructions.     Thin-section helical noncontrast images were obtained from the distal tibia/fibula through the forefoot. Sagittal and coronal reconstructions were generated from the axial images.     Up to date radiation dose reduction adjustments have been utilized to meet ALARA  standards for radiation dose reduction.     COMPARISON:  Plain films of the right foot 7/31/2020.     FINDINGS:  No ankle fracture or dislocation is present.  No fluid fracture or dislocation is identified.  No lytic or blastic change is identified to suggest acute or subacute osteomyelitis. No area of focal cortical thickening or sclerosis is identified.  There is soft tissue swelling in the plantar region of the foot below the proximal phalanx of the 2nd toe.  A small skin ulcer is present at this level.  No loculated fluid collection is identified to suggest abscess.     IMPRESSION:     1.  Soft tissue swelling and small skin ulcer in the plantar aspect of the right foot below the distal aspect of the proximal phalanx of the 2nd toe.     2.  No CT evidence of osteomyelitis.     3.  No subcutaneous abscess identified.  Assessment/Plan  Ischemia of 4-5 right toes  Assessment & Plan  - AGUSTINA studies reveal that right 4-5 toes have wave forms consistent with severe ischemia, likely embolic in nature, echo ordered, tele monitoring ordered.  - Ortho consulted and requested CT which showed no CT evidence of osteomyelitis and no subQ abscess identified.   Ortho felt that patient's toes may be salvageable.  Started heparin drip and continue ABX, will monitor for a few days.  - continue IV ABX rocephin, PO flagyl.  Wound culture ordered.      Sepsis (HCC) - resolved  Assessment & Plan  -This is Sepsis Present on admission  -patient still slightly tachycardic (80-90), WBC down to 11.3  - continue IV ABX, flagyl and rocephin.  Wound culture ordered. MRSA by PCR is negative, so OK to continue current ABX regimen.  - IV fluids completed per sepsis protocol.     Class 1 obesity due to excess calories without serious comorbidity with body mass index (BMI) of 33.0 to 33.9 in adult  Assessment & Plan  Body mass index is 33.25 kg/m².   Started on ADA diet     Type 2 diabetes mellitus with hyperglycemia, without long-term current use  of insulin (HCC)  Assessment & Plan  - Not currently on home regimen for past 10 to 15 years.    - adjusted insulin coverage to cover sliding scale + long acting only  - continue to monitor.    - ADA diet ordered.    - Diabetic education ordered  - A1C 10.4     Hypertension  - BP high overnight 170/100 at times.   - started lisinopril 5mg daily     VTE prophylaxis: heparin gtt

## 2020-08-02 NOTE — PROGRESS NOTES
Telemetry Shift Summary     Rhythm Sinus Rhythm  HR Range 87-99 up to 120s  Ectopy  rare PACs,  Measurements .16/.08/.36              Normal Values  Rhythm SR  HR Range    Measurements 0.12-0.20 / 0.06-0.10  / 0.30-0.52

## 2020-08-02 NOTE — PROGRESS NOTES
Patient's blood sugar before lunch was 108. Rigoberto and Cordelia notified. Insulin adjusted. Nurse Practioners also updated on nasal swab results.

## 2020-08-02 NOTE — PROGRESS NOTES
Hospital Medicine Daily Progress Note    Date of Service  8/2/2020    Chief Complaint  Right foot pain    Hospital Course   Mr. Eugene is a 54-year-old male with a past medical history of uncontrolled diabetes mellitus and congenital single kidney who presented to the emergency department on 7/31/2020 with complaints of increasing pain to his right foot including discoloration to his fourth and fifth toes after he sustained some form of injury to it a few weeks prior.  He was diagnosed with a diabetic foot infection and sepsis and subsequently started on IV fluids, IV antibiotics, and admitted to the hospital for further evaluation and treatment. ABIs were done and identified embolic ischemia of the toes. Echocardiogram was negative for a cardiac source and had normal systolic and diastolic function. He was started on a heparin drip and orthopedic surgery was consulted. They are hopeful that IV antibiotics and anticoagulation will hope salvage the toes and prevent the need for amputation.       Interval Problem Update  Having some diarrhea now, no mucous, no foul odor, no abd pain. Right toes 4 & 5 are stable in appearance from yesterday.    WBC trending down,m now 11.3. CMP unremarkable. Blood sugars much better controlled now. Borderline at mealtimes, discontinued mealtime insulin and will see how he trends. Kept Lantus & SSI.     Afebrile overnight, HR 80s-90s, SBP 140s-170s, O2 sats WNL on room air. Started on low-dose lisinopril for hypertension.     Consultants/Specialty  Orthopedic surgery - Dr. Vásquez    Code Status  Full code    Disposition  Anticipate discharge home later this week if stays clinically stable.     Review of Systems  Review of Systems   Constitutional: Negative for chills, fever and malaise/fatigue.   Respiratory: Negative for cough, shortness of breath and wheezing.    Cardiovascular: Negative for chest pain, palpitations and leg swelling.   Gastrointestinal: Positive for diarrhea.  Negative for abdominal pain, blood in stool, constipation, melena, nausea and vomiting.   Genitourinary: Negative for dysuria, frequency, hematuria and urgency.   Neurological: Negative for dizziness, tingling, sensory change, speech change, focal weakness, weakness and headaches.        + neuropathic in bilat feet   Psychiatric/Behavioral: Negative for depression. The patient is not nervous/anxious and does not have insomnia.    All other systems reviewed and are negative.     Physical Exam  Temp:  [36.2 °C (97.2 °F)-36.8 °C (98.3 °F)] 36.8 °C (98.3 °F)  Pulse:  [87-98] 93  Resp:  [17-18] 18  BP: (138-178)/() 138/76  SpO2:  [91 %-100 %] 93 %    Physical Exam  Vitals signs and nursing note reviewed.   Constitutional:       General: He is awake.      Appearance: Normal appearance. He is well-developed. He is obese. He is not ill-appearing.   HENT:      Head: Normocephalic and atraumatic.      Mouth/Throat:      Lips: Pink.      Mouth: Mucous membranes are moist.   Eyes:      Conjunctiva/sclera: Conjunctivae normal.      Pupils: Pupils are equal, round, and reactive to light.   Neck:      Musculoskeletal: Normal range of motion and neck supple.   Cardiovascular:      Rate and Rhythm: Normal rate and regular rhythm.      Pulses: Normal pulses.      Heart sounds: Normal heart sounds.   Pulmonary:      Effort: Pulmonary effort is normal.      Breath sounds: Normal breath sounds.   Abdominal:      General: Bowel sounds are normal. There is no distension or abdominal bruit.      Palpations: Abdomen is soft.      Tenderness: There is no abdominal tenderness.   Musculoskeletal:      Right lower leg: No edema.      Left lower leg: No edema.   Feet:      Comments: Toes # 4 & #5 on the right foot are cyanotic and have delayed cap refill.   Ulcer present on the inner side of the 5th toe on the right foot.   Skin:     General: Skin is warm and dry.      Coloration: Skin is cyanotic (right toes #4 & #5).      Findings: Wound  (Right 5th toe medial ulcer) present.   Neurological:      General: No focal deficit present.      Mental Status: He is alert and oriented to person, place, and time.      GCS: GCS eye subscore is 4. GCS verbal subscore is 5. GCS motor subscore is 6.   Psychiatric:         Attention and Perception: Attention and perception normal.         Mood and Affect: Mood and affect normal.         Speech: Speech normal.         Behavior: Behavior normal. Behavior is cooperative.         Thought Content: Thought content normal.         Cognition and Memory: Cognition and memory normal.         Judgment: Judgment normal.     Fluids    Intake/Output Summary (Last 24 hours) at 8/2/2020 1525  Last data filed at 8/2/2020 1157  Gross per 24 hour   Intake 1381.19 ml   Output no documentation   Net 1381.19 ml     Laboratory  Recent Labs     07/31/20 2226 08/02/20  0455   WBC 13.3* 11.3*   RBC 4.68* 4.96   HEMOGLOBIN 14.3 15.1   HEMATOCRIT 42.0 44.6   MCV 89.7 89.9   MCH 30.6 30.4   MCHC 34.0 33.9   RDW 42.2 41.1   PLATELETCT 271 287   MPV 10.1 9.8     Recent Labs     07/31/20 2226 08/02/20  0455   SODIUM 135 136   POTASSIUM 4.6 4.7   CHLORIDE 104 104   CO2 23 24   GLUCOSE 340* 137*   BUN 22 11   CREATININE 1.26 0.83   CALCIUM 8.2* 8.8     Recent Labs     08/01/20  2146 08/02/20  0455 08/02/20  1102   APTT 41.7* 62.5* 47.4*     Imaging  EC-ECHOCARDIOGRAM COMPLETE W/ CONT   Final Result      US-AGUSTINA SINGLE LEVEL BILAT   Final Result      CT-FOOT W/O PLUS RECONS RIGHT   Final Result      1.  Soft tissue swelling and small skin ulcer in the plantar aspect of the right foot below the distal aspect of the proximal phalanx of the 2nd toe.      2.  No CT evidence of osteomyelitis.      3.  No subcutaneous abscess identified.      DX-FOOT-COMPLETE 3+ RIGHT   Final Result         1.  No acute traumatic bony injury.         Assessment/Plan  * Diabetic foot infection (HCC)- (present on admission)  Assessment & Plan  -Continue IV antibiotics. No  MRSA coverage needed.   -Wound culture pending.   -Orthopedic surgery involved.     Ischemia of toes- (present on admission)  Assessment & Plan  -Embolic per AGUSTINA report.   -Continue heparin drip. Monitor progression.   -Orthopedic surgery following.     Type 2 diabetes mellitus with hyperglycemia, without long-term current use of insulin (HCC)- (present on admission)  Assessment & Plan  -Better controlled now that on appropriate regimen.   -A1C 10.4% this admission.   -DM education ordered.   -Continue FSBS ACHS. Continue lantus & SSI.  D/C pre-prandial insulin, as BS have been borderline prior to mealtimes.   -DM diet.     Class 1 obesity due to excess calories without serious comorbidity with body mass index (BMI) of 33.0 to 33.9 in adult- (present on admission)  Assessment & Plan  Body mass index is 32.88 kg/m².  -Could likely benefit from outpatient weight loss counseling after his acute issues have resolved.  This can be arranged through his PCP at the appropriate time.     VTE prophylaxis: Heparin drip      Electronically signed by:  Cordelia Rogers, MSN, RN, APRN, ACNPC-AG, CCRN  Nurse Practitioner, [facility]  Work # [phone]    8/2/2020    3:26 PM

## 2020-08-02 NOTE — DISCHARGE PLANNING
Care Transition Team Assessment    Information Source  Orientation : Oriented x 4  Information Given By: (chart review)    Elopement Risk  Legal Hold: No  Ambulatory or Self Mobile in Wheelchair: Yes  Disoriented: No  Psychiatric Symptoms: None  History of Wandering: No  Elopement this Admit: No  Vocalizing Wanting to Leave: No  Displays Behaviors, Body Language Wanting to Leave: No-Not at Risk for Elopement  Elopement Risk: Not at Risk for Elopement    Interdisciplinary Discharge Planning  Does Admitting Nurse Feel This Could be a Complex Discharge?: No  Lives with - Patient's Self Care Capacity: Spouse  Patient or legal guardian wants to designate a caregiver (see row info): No    Discharge Preparedness  What is your plan after discharge?: Home with help  What are your discharge supports?: Spouse  Prior Functional Level: Ambulatory, Independent with Activities of Daily Living, Independent with Medication Management  Difficulity with ADLs: None  Difficulity with IADLs: None    Functional Assesment  Prior Functional Level: Ambulatory, Independent with Activities of Daily Living, Independent with Medication Management    Finances  Financial Barriers to Discharge: No  Prescription Coverage: Yes    Vision / Hearing Impairment  Vision Impairment : No  Hearing Impairment : No  Does Pt Need Special Equipment for the Hearing Impaired?: No    Domestic Abuse  Have you ever been the victim of abuse or violence?: No  Physical Abuse or Sexual Abuse: No  Verbal Abuse or Emotional Abuse: No  Possible Abuse Reported to:: Not Applicable    Psychological Assessment  History of Substance Abuse: None  History of Psychiatric Problems: No  Non-compliant with Treatment: No  Newly Diagnosed Illness: Yes    Discharge Risks or Barriers  Discharge risks or barriers?: No    Anticipated Discharge Information  Anticipated discharge disposition: Home

## 2020-08-03 PROBLEM — A49.02 MRSA INFECTION: Status: ACTIVE | Noted: 2020-08-03

## 2020-08-03 PROBLEM — I10 ESSENTIAL HYPERTENSION: Status: ACTIVE | Noted: 2020-08-03

## 2020-08-03 LAB
ANION GAP SERPL CALC-SCNC: 9 MMOL/L (ref 7–16)
APTT PPP: 71.1 SEC (ref 24.7–36)
BUN SERPL-MCNC: 17 MG/DL (ref 8–22)
CALCIUM SERPL-MCNC: 8.5 MG/DL (ref 8.4–10.2)
CHLORIDE SERPL-SCNC: 103 MMOL/L (ref 96–112)
CK SERPL-CCNC: 119 U/L (ref 0–154)
CO2 SERPL-SCNC: 24 MMOL/L (ref 20–33)
CREAT SERPL-MCNC: 1.07 MG/DL (ref 0.5–1.4)
ERYTHROCYTE [DISTWIDTH] IN BLOOD BY AUTOMATED COUNT: 42.3 FL (ref 35.9–50)
GLUCOSE BLD-MCNC: 183 MG/DL (ref 65–99)
GLUCOSE BLD-MCNC: 97 MG/DL (ref 65–99)
GLUCOSE SERPL-MCNC: 102 MG/DL (ref 65–99)
HCT VFR BLD AUTO: 45 % (ref 42–52)
HGB BLD-MCNC: 15.1 G/DL (ref 14–18)
MCH RBC QN AUTO: 30.5 PG (ref 27–33)
MCHC RBC AUTO-ENTMCNC: 33.6 G/DL (ref 33.7–35.3)
MCV RBC AUTO: 90.9 FL (ref 81.4–97.8)
PLATELET # BLD AUTO: 279 K/UL (ref 164–446)
PMV BLD AUTO: 10 FL (ref 9–12.9)
POTASSIUM SERPL-SCNC: 4.2 MMOL/L (ref 3.6–5.5)
RBC # BLD AUTO: 4.95 M/UL (ref 4.7–6.1)
SODIUM SERPL-SCNC: 136 MMOL/L (ref 135–145)
WBC # BLD AUTO: 10.9 K/UL (ref 4.8–10.8)

## 2020-08-03 PROCEDURE — 85730 THROMBOPLASTIN TIME PARTIAL: CPT

## 2020-08-03 PROCEDURE — 770020 HCHG ROOM/CARE - TELE (206)

## 2020-08-03 PROCEDURE — 85027 COMPLETE CBC AUTOMATED: CPT

## 2020-08-03 PROCEDURE — 80048 BASIC METABOLIC PNL TOTAL CA: CPT

## 2020-08-03 PROCEDURE — A9270 NON-COVERED ITEM OR SERVICE: HCPCS | Performed by: NURSE PRACTITIONER

## 2020-08-03 PROCEDURE — 700105 HCHG RX REV CODE 258: Performed by: NURSE PRACTITIONER

## 2020-08-03 PROCEDURE — 700102 HCHG RX REV CODE 250 W/ 637 OVERRIDE(OP): Performed by: INTERNAL MEDICINE

## 2020-08-03 PROCEDURE — 97597 DBRDMT OPN WND 1ST 20 CM/<: CPT

## 2020-08-03 PROCEDURE — 700102 HCHG RX REV CODE 250 W/ 637 OVERRIDE(OP): Performed by: NURSE PRACTITIONER

## 2020-08-03 PROCEDURE — 700111 HCHG RX REV CODE 636 W/ 250 OVERRIDE (IP): Performed by: HOSPITALIST

## 2020-08-03 PROCEDURE — 82962 GLUCOSE BLOOD TEST: CPT | Mod: 91

## 2020-08-03 PROCEDURE — 82550 ASSAY OF CK (CPK): CPT

## 2020-08-03 PROCEDURE — 700111 HCHG RX REV CODE 636 W/ 250 OVERRIDE (IP): Performed by: NURSE PRACTITIONER

## 2020-08-03 PROCEDURE — 700105 HCHG RX REV CODE 258: Performed by: HOSPITALIST

## 2020-08-03 PROCEDURE — 700102 HCHG RX REV CODE 250 W/ 637 OVERRIDE(OP): Performed by: HOSPITALIST

## 2020-08-03 PROCEDURE — A9270 NON-COVERED ITEM OR SERVICE: HCPCS | Performed by: HOSPITALIST

## 2020-08-03 PROCEDURE — A9270 NON-COVERED ITEM OR SERVICE: HCPCS | Performed by: INTERNAL MEDICINE

## 2020-08-03 PROCEDURE — 99233 SBSQ HOSP IP/OBS HIGH 50: CPT | Performed by: INTERNAL MEDICINE

## 2020-08-03 RX ORDER — DEXTROSE MONOHYDRATE 25 G/50ML
50 INJECTION, SOLUTION INTRAVENOUS
Status: DISCONTINUED | OUTPATIENT
Start: 2020-08-03 | End: 2020-08-10 | Stop reason: HOSPADM

## 2020-08-03 RX ORDER — INSULIN GLARGINE 100 [IU]/ML
22 INJECTION, SOLUTION SUBCUTANEOUS EVERY EVENING
Status: DISCONTINUED | OUTPATIENT
Start: 2020-08-03 | End: 2020-08-10 | Stop reason: HOSPADM

## 2020-08-03 RX ADMIN — INSULIN LISPRO 1 UNITS: 100 INJECTION, SOLUTION INTRAVENOUS; SUBCUTANEOUS at 11:24

## 2020-08-03 RX ADMIN — DAPTOMYCIN 600 MG: 350 INJECTION, POWDER, LYOPHILIZED, FOR SOLUTION INTRAVENOUS at 11:20

## 2020-08-03 RX ADMIN — ACETAMINOPHEN 650 MG: 325 TABLET, FILM COATED ORAL at 20:09

## 2020-08-03 RX ADMIN — LISINOPRIL 5 MG: 5 TABLET ORAL at 05:59

## 2020-08-03 RX ADMIN — CEFTRIAXONE SODIUM 2 G: 2 INJECTION, POWDER, FOR SOLUTION INTRAMUSCULAR; INTRAVENOUS at 05:59

## 2020-08-03 RX ADMIN — INSULIN GLARGINE 22 UNITS: 100 INJECTION, SOLUTION SUBCUTANEOUS at 17:21

## 2020-08-03 RX ADMIN — ACETAMINOPHEN 650 MG: 325 TABLET, FILM COATED ORAL at 06:00

## 2020-08-03 RX ADMIN — METRONIDAZOLE 500 MG: 500 TABLET ORAL at 05:59

## 2020-08-03 RX ADMIN — HEPARIN SODIUM 25000 UNITS: 5000 INJECTION, SOLUTION INTRAVENOUS at 01:27

## 2020-08-03 RX ADMIN — HEPARIN SODIUM 1600 UNITS/HR: 5000 INJECTION, SOLUTION INTRAVENOUS at 16:28

## 2020-08-03 RX ADMIN — INSULIN LISPRO 2 UNITS: 100 INJECTION, SOLUTION INTRAVENOUS; SUBCUTANEOUS at 20:11

## 2020-08-03 ASSESSMENT — ENCOUNTER SYMPTOMS
CHILLS: 0
EYES NEGATIVE: 1
COUGH: 0
HEARTBURN: 0
CLAUDICATION: 1
CONSTIPATION: 0
DIARRHEA: 1
WHEEZING: 0
INSOMNIA: 0
SENSORY CHANGE: 0
HEMOPTYSIS: 0
WEAKNESS: 0
FOCAL WEAKNESS: 0
PSYCHIATRIC NEGATIVE: 1
NAUSEA: 0
PALPITATIONS: 0
VOMITING: 0
TINGLING: 0
DIARRHEA: 0
PND: 0
SPEECH CHANGE: 0
ORTHOPNEA: 0
HEADACHES: 0
ABDOMINAL PAIN: 0
BLOOD IN STOOL: 0
MUSCULOSKELETAL NEGATIVE: 1
NERVOUS/ANXIOUS: 0
DEPRESSION: 0
SHORTNESS OF BREATH: 0
FEVER: 0
DIZZINESS: 0

## 2020-08-03 NOTE — PROGRESS NOTES
Telemetry Shift Summary     Rhythm Sinus Rhythm   HR Range   Ectopy  rare PVCs  Measurements .16/.08/.34              Normal Values  Rhythm SR  HR Range    Measurements 0.12-0.20 / 0.06-0.10  / 0.30-0.52

## 2020-08-03 NOTE — PROGRESS NOTES
Telemetry Shift Summary    Rhythm SR/ST  HR Range   Ectopy R PAC  Measurements 0.12/0.10/0.36    Per Kirti, [place]        Normal Values  Rhythm SR  HR Range    Measurements 0.12-0.20 / 0.06-0.10  / 0.30-0.52

## 2020-08-03 NOTE — CARE PLAN
Problem: Venous Thromboembolism (VTW)/Deep Vein Thrombosis (DVT) Prevention:  Goal: Patient will participate in Venous Thrombosis (VTE)/Deep Vein Thrombosis (DVT)Prevention Measures  Outcome: PROGRESSING AS EXPECTED  Intervention: Assess and monitor for anticoagulation complications  Note: Heparin gtt therapeutic at this time. No signs of complications.     Problem: Fluid Volume:  Goal: Will maintain balanced intake and output  Outcome: PROGRESSING AS EXPECTED  Intervention: Monitor, educate, and encourage compliance with therapeutic intake of liquids  Note: Patient with adequate intake of fluids. Verbalizes feeling hydrated, BP's stable.

## 2020-08-03 NOTE — PROGRESS NOTES
Daily Progress Note    Date of Service  8/3/2020    Chief Complaint  54 y.o. male admitted 7/31/2020 with right 4/5 toe wound    Hospital Course  Patient has history of diabetes mellitus with very poor control and congenital single kidney presented with wound to 4-5th right toes.  He reports having kicked some furniture some time ago with his right foot, and over the next several days having increasing pain, as well as redness and then blue discoloration of his fourth and fifth toes.  He reports not doing much about this, however in the days just before admission, the pain and swelling and discoloration became much worse, prompting his visit to the emergency department. LPS was consulted, however, they do not come to [facility].     Interval Problem Update  8/1 Thoroughly examined wound which does appear to have an ulcer in between his 4th and 5th left toes.  Discussed DM management which patient is agreeable to working on.  Placed patient NPO and consulted Ortho, who requested CT of foot and have patient tentatively scheduled for surgery this afternoon.  Upon review of CT, ortho chose to hold off on surgery and start heparin drip in hopes to salvage the toe.  We also ordered vascular studies to rule out any vascular cause for this wound. AGUSTINA studies reveal that right 4-5 toes have wave forms consistent with severe ischemia, likely embolic in nature, echo ordered, tele monitoring ordered. Labs and vitals reviewed. Blood sugars coming closer to normal with the administration of insulin. WBC elevated, ordered wound culture.     8/2 states wound is feeling better and he can feel that it is healing.  4th right toe is more vascularized and less blue, however cap refill is still >5 seconds on both 4/5 right toes.  Hep gtt continues.  WBC count improved, now 11.3. Echo WDL, tele monitoring NSR. Hbg A1C back at 10.4, blood sugars are now WDL, changed insulin to sliding scale coverage without nutritional coverage.    8/3 wound is  appearing better, cap refill > 3 seconds today.  WBC continues to trend down 10.3. Glucose levels continuing to maintain in low 100's on current regimen.  Wound culture shows + MRSA, so switched ABX to dapto. BP improved after starting lisinopril 5mg yestday.    Consultants/Specialty  Ortho- Dr. Mallory    Code Status  full    Disposition  home    Review of Systems  Review of Systems   Constitutional: Negative for chills, fever and malaise/fatigue.   HENT: Negative.    Eyes: Negative.    Respiratory: Negative for cough, hemoptysis and shortness of breath.    Cardiovascular: Positive for claudication. Negative for chest pain, palpitations and orthopnea.   Gastrointestinal: Negative for abdominal pain, constipation, diarrhea, heartburn, nausea and vomiting.   Genitourinary: Negative for dysuria, frequency and urgency.   Musculoskeletal: Negative.    Neurological: Negative for dizziness, tingling and headaches.   Psychiatric/Behavioral: Negative.         Physical Exam  Temp:  [36.7 °C (98.1 °F)-37 °C (98.6 °F)] 36.8 °C (98.2 °F)  Pulse:  [] 92  Resp:  [12-18] 12  BP: (114-155)/(65-84) 117/74  SpO2:  [91 %-95 %] 93 %    Physical Exam  Vitals signs and nursing note reviewed. Exam conducted with a chaperone present.   Constitutional:       General: He is not in acute distress.     Appearance: Normal appearance. He is not ill-appearing.   HENT:      Head: Normocephalic and atraumatic.      Nose: Nose normal.      Mouth/Throat:      Mouth: Mucous membranes are dry.   Eyes:      Extraocular Movements: Extraocular movements intact.      Conjunctiva/sclera: Conjunctivae normal.      Pupils: Pupils are equal, round, and reactive to light.   Neck:      Musculoskeletal: Normal range of motion and neck supple.   Cardiovascular:      Rate and Rhythm: Normal rate and regular rhythm.      Pulses: Normal pulses.      Heart sounds: Normal heart sounds.   Pulmonary:      Effort: Pulmonary effort is normal.      Breath sounds:  Normal breath sounds.   Abdominal:      General: Abdomen is flat. Bowel sounds are normal.      Palpations: Abdomen is soft.   Musculoskeletal: Normal range of motion.   Feet:      Right foot:      Skin integrity: Ulcer present.      Comments: 4/5 right toes  Skin:     General: Skin is warm and dry.   Neurological:      General: No focal deficit present.      Mental Status: He is alert and oriented to person, place, and time.   Psychiatric:         Mood and Affect: Mood normal.         Behavior: Behavior normal.         Fluids    Intake/Output Summary (Last 24 hours) at 8/3/2020 1154  Last data filed at 8/3/2020 0629  Gross per 24 hour   Intake 1166.33 ml   Output --   Net 1166.33 ml       Laboratory  Recent Labs     07/31/20  2226 08/02/20  0455 08/03/20  0001   WBC 13.3* 11.3* 10.9*   RBC 4.68* 4.96 4.95   HEMOGLOBIN 14.3 15.1 15.1   HEMATOCRIT 42.0 44.6 45.0   MCV 89.7 89.9 90.9   MCH 30.6 30.4 30.5   MCHC 34.0 33.9 33.6*   RDW 42.2 41.1 42.3   PLATELETCT 271 287 279   MPV 10.1 9.8 10.0     Recent Labs     07/31/20  2226 08/02/20  0455 08/03/20  0001   SODIUM 135 136 136   POTASSIUM 4.6 4.7 4.2   CHLORIDE 104 104 103   CO2 23 24 24   GLUCOSE 340* 137* 102*   BUN 22 11 17   CREATININE 1.26 0.83 1.07   CALCIUM 8.2* 8.8 8.5     Recent Labs     08/02/20  1102 08/02/20  1748 08/03/20  0001   APTT 47.4* 61.5* 71.1*               Imaging  None new    Assessment/Plan  Diabetic foot infection (HCC)- (present on admission)  Assessment & Plan  -Change ABX to dapto d/t +MRSA in wound.   -Wound culture +MRSA   -Orthopedic surgery involved.      Ischemia of toes- (present on admission)  Assessment & Plan  -Embolic per AGUSTINA report.   -Continue heparin drip. Monitor progression.   -Orthopedic surgery following.      Type 2 diabetes mellitus with hyperglycemia, without long-term current use of insulin (HCC)- (present on admission)  Assessment & Plan  -Better controlled now that on appropriate regimen, in low 100's overnight  -A1C  10.4% this admission.   -DM education ordered.   -Continue FSBS ACHS. Continue lantus & SSI.  D/C pre-prandial insulin, as BS have been borderline prior to mealtimes.   -DM diet.      Class 1 obesity due to excess calories without serious comorbidity with body mass index (BMI) of 33.0 to 33.9 in adult- (present on admission)  Assessment & Plan  Body mass index is 32.88 kg/m².  -Could likely benefit from outpatient weight loss counseling after his acute issues have resolved.  This can be arranged through his PCP at the appropriate time.     VTE prophylaxis: heparin drip

## 2020-08-03 NOTE — PROGRESS NOTES
Hospital Medicine Daily Progress Note    Date of Service  8/3/2020    Chief Complaint  Right foot pain    Hospital Course   Mr. Eugene is a 54-year-old male with a past medical history of uncontrolled diabetes mellitus and congenital single kidney who presented to the emergency department on 7/31/2020 with complaints of increasing pain to his right foot including discoloration to his fourth and fifth toes after he sustained some form of injury to it a few weeks prior.  He was diagnosed with a diabetic foot infection and sepsis and subsequently started on IV fluids, IV antibiotics, and admitted to the hospital for further evaluation and treatment. ABIs were done and identified embolic ischemia of the toes. Echocardiogram was negative for a cardiac source and had normal systolic and diastolic function. He was started on a heparin drip and orthopedic surgery was consulted. They are hopeful that IV antibiotics and anticoagulation will salvage the toes and prevent the need for amputation.       Interval Problem Update  Feeling good today, toes appear to have slightly more perfusion on exam. Diarrhea subsiding. Denies abd pain, no N/V.     WBC continues to trend down, now near normal at 10.9. BMP still with normal renal function & electrolyte levels.   Blood sugars still relatively well controlled, there were a couple of elevated readings since dc'ing preprandial insulin yesterday. Lantus increased.     Afebrile overnight, HR 80s-90s, SBP improved to 140s-150s after starting lisinopril yesterday, O2 sats remain WNL on room air.     Consultants/Specialty  Orthopedic surgery - Dr. Mallory    Code Status  Full code    Disposition  Anticipate discharge home later this week if stays clinically stable.     Review of Systems  Review of Systems   Constitutional: Negative for chills, fever and malaise/fatigue.   Respiratory: Negative for cough, shortness of breath and wheezing.    Cardiovascular: Negative for chest pain,  palpitations, orthopnea, leg swelling and PND.   Gastrointestinal: Positive for diarrhea (subsiding now). Negative for abdominal pain, blood in stool, constipation, melena, nausea and vomiting.   Genitourinary: Negative for dysuria, frequency and urgency.   Musculoskeletal:        4th & 5th toe pain with palpation.  Bilat foot pain, chronic, neuropathic.    Neurological: Negative for dizziness, tingling, sensory change, speech change, focal weakness, weakness and headaches.        + neuropathic in bilat feet   Psychiatric/Behavioral: Negative for depression. The patient is not nervous/anxious and does not have insomnia.    All other systems reviewed and are negative.     Physical Exam  Temp:  [36.7 °C (98.1 °F)-37 °C (98.6 °F)] 36.8 °C (98.2 °F)  Pulse:  [] 92  Resp:  [12-18] 12  BP: (114-155)/(65-84) 117/74  SpO2:  [91 %-95 %] 93 %    Physical Exam  Vitals signs and nursing note reviewed.   Constitutional:       General: He is awake.      Appearance: Normal appearance. He is well-developed. He is obese. He is not ill-appearing.   HENT:      Head: Normocephalic and atraumatic.      Mouth/Throat:      Lips: Pink.      Mouth: Mucous membranes are moist.   Eyes:      Conjunctiva/sclera: Conjunctivae normal.      Pupils: Pupils are equal, round, and reactive to light.   Neck:      Musculoskeletal: Normal range of motion and neck supple.   Cardiovascular:      Rate and Rhythm: Normal rate and regular rhythm.      Pulses: Normal pulses.      Heart sounds: Normal heart sounds.   Pulmonary:      Effort: Pulmonary effort is normal.      Breath sounds: Normal breath sounds.   Abdominal:      General: Abdomen is protuberant. Bowel sounds are normal. There is no distension or abdominal bruit.      Palpations: Abdomen is soft.      Tenderness: There is no abdominal tenderness.   Musculoskeletal:      Right lower leg: No edema.      Left lower leg: No edema.   Feet:      Right foot:      Skin integrity: Skin breakdown  (medial side of 4th toe) present.      Comments: Toes # 4 & #5 on the right foot are less cyanotic today and cap refill is improved.   Ulcer present on the inner side of the 5th toe on the right foot, stable from yesterday.   Skin:     General: Skin is warm and dry.      Capillary Refill: Capillary refill takes 2 to 3 seconds.      Coloration: Skin is cyanotic (right toes #4 & #5, mildly improved today).      Findings: Wound (Right 5th toe medial ulcer) present.   Neurological:      General: No focal deficit present.      Mental Status: He is alert and oriented to person, place, and time.      GCS: GCS eye subscore is 4. GCS verbal subscore is 5. GCS motor subscore is 6.   Psychiatric:         Attention and Perception: Attention and perception normal.         Mood and Affect: Mood and affect normal.         Speech: Speech normal.         Behavior: Behavior normal. Behavior is cooperative.         Thought Content: Thought content normal.         Cognition and Memory: Cognition and memory normal.         Judgment: Judgment normal.      Comments: Very pleasant.      Fluids    Intake/Output Summary (Last 24 hours) at 8/3/2020 1331  Last data filed at 8/3/2020 0629  Gross per 24 hour   Intake 666.33 ml   Output no documentation   Net 666.33 ml     Laboratory  Recent Labs     07/31/20 2226 08/02/20  0455 08/03/20  0001   WBC 13.3* 11.3* 10.9*   RBC 4.68* 4.96 4.95   HEMOGLOBIN 14.3 15.1 15.1   HEMATOCRIT 42.0 44.6 45.0   MCV 89.7 89.9 90.9   MCH 30.6 30.4 30.5   MCHC 34.0 33.9 33.6*   RDW 42.2 41.1 42.3   PLATELETCT 271 287 279   MPV 10.1 9.8 10.0     Recent Labs     07/31/20  2226 08/02/20  0455 08/03/20  0001   SODIUM 135 136 136   POTASSIUM 4.6 4.7 4.2   CHLORIDE 104 104 103   CO2 23 24 24   GLUCOSE 340* 137* 102*   BUN 22 11 17   CREATININE 1.26 0.83 1.07   CALCIUM 8.2* 8.8 8.5     Recent Labs     08/02/20  1102 08/02/20  1748 08/03/20  0001   APTT 47.4* 61.5* 71.1*     Imaging  EC-ECHOCARDIOGRAM COMPLETE W/ CONT    Final Result      US-AGUSTINA SINGLE LEVEL BILAT   Final Result      CT-FOOT W/O PLUS RECONS RIGHT   Final Result      1.  Soft tissue swelling and small skin ulcer in the plantar aspect of the right foot below the distal aspect of the proximal phalanx of the 2nd toe.      2.  No CT evidence of osteomyelitis.      3.  No subcutaneous abscess identified.      DX-FOOT-COMPLETE 3+ RIGHT   Final Result         1.  No acute traumatic bony injury.         Assessment/Plan  * Diabetic foot infection (HCC)- (present on admission)  Assessment & Plan  -MRSA growing in wound culture. Changed to IV dapto today since the patient only has 1 kidney.   -Orthopedic surgery following.   -Wound care consulted.     Ischemia of toes- (present on admission)  Assessment & Plan  -Embolic per AGUSTINA report. Echo showed no cardiac source of emboli.   -Continue heparin drip. Monitor clinical progression.   -Orthopedic surgery following. They are hopeful that IV anticoagulation & antibiotics will help salvage his toes and prevent the need for amputation.     Essential hypertension- (present on admission)  Assessment & Plan  -Given risk factors for CAD, placed on lisinopril starting 8/2/2020. BPs improved to 140s-150s since then. Pt with only 1 kidney, congenital. Will continue to trend BPs & adjust meds if needed.     Type 2 diabetes mellitus with hyperglycemia, without long-term current use of insulin (HCC)- (present on admission)  Assessment & Plan  -FSBS with a couple of mildly elevated readings today. Will increase lantus from 20 units to 22 units & monitor response.   -A1C 10.4% this admission.   -DM education ordered.   -Continue FSBS ACHS. Continue lantus & SSI.   -DM diet.   -Hypoglycemia protocol in place if needed.     Class 1 obesity due to excess calories without serious comorbidity with body mass index (BMI) of 33.0 to 33.9 in adult- (present on admission)  Assessment & Plan  Body mass index is 32.88 kg/m².  -Could likely benefit from  outpatient weight loss counseling after his acute issues have resolved.  This can be arranged through his PCP at the appropriate time.     VTE prophylaxis: Heparin drip      Electronically signed by:  Cordelia Rogers, MSN, RN, APRN, ACNPC-AG, CCRN  Nurse Practitioner, [facility]  Work # [phone]    8/3/2020    1:29 PM

## 2020-08-03 NOTE — DISCHARGE PLANNING
Anticipated Discharge Disposition: Home    Action: SW completed chart review. SW to continue to follow for d/c planning needs.     Barriers to Discharge: TBD    Plan: Follow up with treatment team for updates.

## 2020-08-03 NOTE — DIETARY
Nutrition Services: Consult for DM diet ed noted. CHRISTIAN provided DM diet ed on 8/1. RD met with pt to see if he has any questions. He said that he closely followed this diet in the past for 5 years. He is aware of dietary/DM monitoring changes he needs to make to help control his glucose levels. RD will follow PRN.

## 2020-08-03 NOTE — CARE PLAN
Problem: Communication  Goal: The ability to communicate needs accurately and effectively will improve  Outcome: PROGRESSING AS EXPECTED  Intervention: [place] patient and significant other/support system to call light to alert staff of needs  Flowsheets (Taken 8/2/2020 0117)  Oriented to:: All of the Following : Location of Bathroom, Visiting Policy, Unit Routine, Call Light and Bedside Controls, Bedside Rail Policy, Smoking Policy, Rights and Responsibilities, Bedside Report, and Patient Education Notebook  Intervention: Reorient patient to environment as needed  Flowsheets (Taken 8/2/2020 0117)  Oriented to:: All of the Following : Location of Bathroom, Visiting Policy, Unit Routine, Call Light and Bedside Controls, Bedside Rail Policy, Smoking Policy, Rights and Responsibilities, Bedside Report, and Patient Education Notebook  Intervention: Educate patient and significant other/support system about the plan of care, procedures, treatments, medications and allow for questions  Flowsheets (Taken 8/2/2020 0117)  Pt & Family Have Been Educated on Methods Available to Report Concerns Related to Care, Treatment, Services, and Patient Safety Issues: Yes  Intervention: Use communication aids and/or /Language Line as appropriate  Flowsheets (Taken 8/2/2020 0117)  Does Pt Need Special Equipment for the Hearing Impaired?: No     Problem: Safety  Goal: Will remain free from injury  Outcome: PROGRESSING AS EXPECTED  Intervention: Provide assistance with mobility  Flowsheets (Taken 8/2/2020 2000)  Assistance: No Assistance Required  Ambulation Tolerance: Tolerates Well  Intervention: Collaborate with Interdisciplinary Team for safe transfer and mobilization techniques  Flowsheets (Taken 8/2/2020 2000)  Assistive Devices: None  Goal: Will remain free from falls  Outcome: PROGRESSING AS EXPECTED  Intervention: Assess risk factors for falls  Flowsheets  Taken 8/2/2020 2000  Pt Calls for Assistance:   Yes   No  assistance required  Taken 8/2/2020 0117  History of fall: 0  Intervention: Implement fall precautions  Flowsheets (Taken 8/2/2020 2000)  Environmental Precautions:   Treaded Slipper Socks on Patient   Transferred to Stronger Side   Personal Belongings, Wastebasket, Call Bell etc. in Easy Reach   Report Given to Other Health Care Providers Regarding Fall Risk   Bed in Low Position   Communication Sign for Patients & Families   Mobility Assessed & Appropriate Sign Placed

## 2020-08-03 NOTE — ASSESSMENT & PLAN NOTE
-Given risk factors for CAD, placed on lisinopril starting 8/2/2020. BPs improved to 140s-150s since then. Pt with only 1 kidney, congenital.  - Will continue to trend BPs & adjust meds if needed.  -Monitor

## 2020-08-03 NOTE — PROGRESS NOTES
Received shift handoff report from Abeba PIERRE. Pt is in bed and stable. Bed locked and in lowest position, upper side rails up, call light in reach, whiteboard updated. Patient's spouse in room. POC discussed and pt verbalizes understanding.

## 2020-08-04 PROBLEM — A49.02 MRSA INFECTION: Status: RESOLVED | Noted: 2020-08-03 | Resolved: 2020-08-04

## 2020-08-04 LAB
ANION GAP SERPL CALC-SCNC: 14 MMOL/L (ref 7–16)
APTT PPP: 52.1 SEC (ref 24.7–36)
APTT PPP: 59.9 SEC (ref 24.7–36)
APTT PPP: 63.5 SEC (ref 24.7–36)
BACTERIA WND AEROBE CULT: ABNORMAL
BASOPHILS # BLD AUTO: 1 % (ref 0–1.8)
BASOPHILS # BLD: 0.1 K/UL (ref 0–0.12)
BUN SERPL-MCNC: 16 MG/DL (ref 8–22)
CALCIUM SERPL-MCNC: 8.7 MG/DL (ref 8.4–10.2)
CHLORIDE SERPL-SCNC: 103 MMOL/L (ref 96–112)
CO2 SERPL-SCNC: 20 MMOL/L (ref 20–33)
CREAT SERPL-MCNC: 0.91 MG/DL (ref 0.5–1.4)
EOSINOPHIL # BLD AUTO: 0.26 K/UL (ref 0–0.51)
EOSINOPHIL NFR BLD: 2.5 % (ref 0–6.9)
ERYTHROCYTE [DISTWIDTH] IN BLOOD BY AUTOMATED COUNT: 43.6 FL (ref 35.9–50)
GLUCOSE BLD-MCNC: 219 MG/DL (ref 65–99)
GLUCOSE BLD-MCNC: 80 MG/DL (ref 65–99)
GLUCOSE BLD-MCNC: 95 MG/DL (ref 65–99)
GLUCOSE SERPL-MCNC: 86 MG/DL (ref 65–99)
GRAM STN SPEC: ABNORMAL
HCT VFR BLD AUTO: 44.4 % (ref 42–52)
HGB BLD-MCNC: 14.9 G/DL (ref 14–18)
IMM GRANULOCYTES # BLD AUTO: 0.02 K/UL (ref 0–0.11)
IMM GRANULOCYTES NFR BLD AUTO: 0.2 % (ref 0–0.9)
LYMPHOCYTES # BLD AUTO: 2.78 K/UL (ref 1–4.8)
LYMPHOCYTES NFR BLD: 27.2 % (ref 22–41)
MCH RBC QN AUTO: 30.8 PG (ref 27–33)
MCHC RBC AUTO-ENTMCNC: 33.6 G/DL (ref 33.7–35.3)
MCV RBC AUTO: 91.7 FL (ref 81.4–97.8)
MONOCYTES # BLD AUTO: 1.19 K/UL (ref 0–0.85)
MONOCYTES NFR BLD AUTO: 11.6 % (ref 0–13.4)
NEUTROPHILS # BLD AUTO: 5.87 K/UL (ref 1.82–7.42)
NEUTROPHILS NFR BLD: 57.5 % (ref 44–72)
NRBC # BLD AUTO: 0 K/UL
NRBC BLD-RTO: 0 /100 WBC
PLATELET # BLD AUTO: 312 K/UL (ref 164–446)
PMV BLD AUTO: 10.5 FL (ref 9–12.9)
POTASSIUM SERPL-SCNC: 4 MMOL/L (ref 3.6–5.5)
RBC # BLD AUTO: 4.84 M/UL (ref 4.7–6.1)
SIGNIFICANT IND 70042: ABNORMAL
SITE SITE: ABNORMAL
SODIUM SERPL-SCNC: 137 MMOL/L (ref 135–145)
SOURCE SOURCE: ABNORMAL
WBC # BLD AUTO: 10.2 K/UL (ref 4.8–10.8)

## 2020-08-04 PROCEDURE — 99232 SBSQ HOSP IP/OBS MODERATE 35: CPT | Performed by: INTERNAL MEDICINE

## 2020-08-04 PROCEDURE — A9270 NON-COVERED ITEM OR SERVICE: HCPCS | Performed by: NURSE PRACTITIONER

## 2020-08-04 PROCEDURE — 770020 HCHG ROOM/CARE - TELE (206)

## 2020-08-04 PROCEDURE — 80048 BASIC METABOLIC PNL TOTAL CA: CPT

## 2020-08-04 PROCEDURE — A9270 NON-COVERED ITEM OR SERVICE: HCPCS | Performed by: HOSPITALIST

## 2020-08-04 PROCEDURE — 700105 HCHG RX REV CODE 258: Performed by: INTERNAL MEDICINE

## 2020-08-04 PROCEDURE — 700102 HCHG RX REV CODE 250 W/ 637 OVERRIDE(OP): Performed by: NURSE PRACTITIONER

## 2020-08-04 PROCEDURE — 85730 THROMBOPLASTIN TIME PARTIAL: CPT

## 2020-08-04 PROCEDURE — 700105 HCHG RX REV CODE 258: Performed by: NURSE PRACTITIONER

## 2020-08-04 PROCEDURE — 700102 HCHG RX REV CODE 250 W/ 637 OVERRIDE(OP): Performed by: HOSPITALIST

## 2020-08-04 PROCEDURE — 700111 HCHG RX REV CODE 636 W/ 250 OVERRIDE (IP): Performed by: INTERNAL MEDICINE

## 2020-08-04 PROCEDURE — 82962 GLUCOSE BLOOD TEST: CPT | Mod: 91

## 2020-08-04 PROCEDURE — 700111 HCHG RX REV CODE 636 W/ 250 OVERRIDE (IP): Performed by: NURSE PRACTITIONER

## 2020-08-04 PROCEDURE — 85025 COMPLETE CBC W/AUTO DIFF WBC: CPT

## 2020-08-04 RX ADMIN — INSULIN GLARGINE 22 UNITS: 100 INJECTION, SOLUTION SUBCUTANEOUS at 17:21

## 2020-08-04 RX ADMIN — DAPTOMYCIN 600 MG: 350 INJECTION, POWDER, LYOPHILIZED, FOR SOLUTION INTRAVENOUS at 10:57

## 2020-08-04 RX ADMIN — HEPARIN SODIUM 1750 UNITS/HR: 5000 INJECTION, SOLUTION INTRAVENOUS at 20:53

## 2020-08-04 RX ADMIN — HEPARIN SODIUM 1750 UNITS/HR: 5000 INJECTION, SOLUTION INTRAVENOUS at 07:13

## 2020-08-04 RX ADMIN — HEPARIN SODIUM 3200 UNITS: 1000 INJECTION, SOLUTION INTRAVENOUS; SUBCUTANEOUS at 07:10

## 2020-08-04 RX ADMIN — ACETAMINOPHEN 650 MG: 325 TABLET, FILM COATED ORAL at 13:29

## 2020-08-04 RX ADMIN — LISINOPRIL 5 MG: 5 TABLET ORAL at 06:39

## 2020-08-04 RX ADMIN — CEFTRIAXONE SODIUM 2 G: 2 INJECTION, POWDER, FOR SOLUTION INTRAMUSCULAR; INTRAVENOUS at 06:39

## 2020-08-04 RX ADMIN — ACETAMINOPHEN 650 MG: 325 TABLET, FILM COATED ORAL at 06:39

## 2020-08-04 RX ADMIN — ACETAMINOPHEN 650 MG: 325 TABLET, FILM COATED ORAL at 20:08

## 2020-08-04 ASSESSMENT — ENCOUNTER SYMPTOMS
SHORTNESS OF BREATH: 0
DIZZINESS: 0
DEPRESSION: 0
PALPITATIONS: 0
ABDOMINAL PAIN: 0
INSOMNIA: 0
BLOOD IN STOOL: 0
VOMITING: 0
NERVOUS/ANXIOUS: 0
PND: 0
CHILLS: 0
CONSTIPATION: 0
ORTHOPNEA: 0
DIARRHEA: 1
COUGH: 0
FEVER: 0
TINGLING: 0
FOCAL WEAKNESS: 0
WHEEZING: 0
NAUSEA: 0
SENSORY CHANGE: 0
SPEECH CHANGE: 0
WEAKNESS: 0
HEADACHES: 0

## 2020-08-04 NOTE — PROGRESS NOTES
Telemetry Shift Summary    Rhythm SR/ST  HR Range 80s-120s  Ectopy rare PVC  Measurements 0.16/0.08/0.36        Normal Values  Rhythm SR  HR Range    Measurements 0.12-0.20 / 0.06-0.10  / 0.30-0.52

## 2020-08-04 NOTE — PROGRESS NOTES
NAC overnight, no new c/o, pain improving    AF/VSS  Staph +, MRSA -  4th toe significantly improved  5th toe also improved but there is an area that is still dark in color at the end of the toe  Improving erythema about the 5th toe  TTP about the 5th toe, minimal TTP about the 4th toe  DP pulse 2+    A: Right 4th and 5th toe infection with decreased blood flow  P: Continue with current regimen  Will await for 5th toe to declare itself  I am optimistic the 5th toe will continue to improve  Will continue to follow  Please call with any questions

## 2020-08-04 NOTE — PROGRESS NOTES
Telemetry Shift Summary     Rhythm Sinus Rhythm  HR Range 81-90  Ectopy  r-o PVCs  Measurements .16/.08/.36              Normal Values  Rhythm SR  HR Range    Measurements 0.12-0.20 / 0.06-0.10  / 0.30-0.52

## 2020-08-04 NOTE — WOUND TEAM
[facility]  Inpatient Services  Initial Wound and Skin Care Evaluation    Admission Date: 7/31/2020     Last order of IP CONSULT TO WOUND CARE was found on 8/1/2020 from Hospital Encounter on 7/31/2020       HPI, PMH, SH: Reviewed    Unit where seen by Wound Team: 3314/01     WOUND CONSULT/FOLLOW UP RELATED TO:R 5th toe    Self Report / Pain Level:  Hypersensitivity to foot       OBJECTIVE:  On pressure redistribution mattress, moves self, ambulates to bathroom    WOUND TYPE, LOCATION, CHARACTERISTICS (Pressure Injuries: location, stage, POA or date identified)  Wound 08/01/20 Toe, 5th Right (Active)      08/01/20 0115   Site Assessment Purple;Black    Periwound Assessment Cool;Purple    Margins Attached edges    Closure None    Drainage Amount None    Drainage Description Serous    Treatments Cleansed    Wound Cleansing Normal Saline Irrigation    Periwound Protectant Not Applicable    Dressing Cleansing/Solutions 3% Betadine    Dressing Options Open to Air;Hydrofiber    Dressing Changed Changed    Dressing Status Intact    Dressing Change/Treatment Frequency Daily, and As Needed    NEXT Dressing Change/Treatment Date 08/04/20    NEXT Weekly Photo (Inpatient Only) 08/08/20    Non-staged Wound Description Full thickness    Tunneling (cm) 0 cm 08/03/20 1900   Undermining (cm) 0 cm 08/03/20 1900   Wound Odor Mild    Pulses 2+;DP    Exposed Structures None    Number of days: 2       Wound 08/01/20 Diabetic Ulcer Foot Plantar Right between 1-2 toes callus with wound (Active)      08/03/20 1900   Site Assessment Red;Pink    Periwound Assessment Callused    Margins Defined edges    Closure Secondary intention    Drainage Amount Scant    Drainage Description Serosanguineous    Treatments Cleansed;CSWD - Conservative Sharp Wound Debridement    Wound Cleansing Normal Saline Irrigation    Periwound Protectant Not Applicable    Dressing Cleansing/Solutions Not Applicable    Dressing Options Hydrofera Blue  "Ready;Hypafix Tape    Dressing Changed Changed    Dressing Status Intact    Dressing Change/Treatment Frequency Every 48 hrs, and As Needed    NEXT Dressing Change/Treatment Date 08/05/20    Non-staged Wound Description Partial thickness    Wound Length (cm) 0.5 cm 08/03/20 1900   Wound Width (cm) 1 cm 08/03/20 1900   Wound Depth (cm) 0.3 cm 08/03/20 1900   Wound Surface Area (cm^2) 0.5 cm^2 08/03/20 1900   Wound Volume (cm^3) 0.15 cm^3 08/03/20 1900   Tunneling (cm) 0 cm 08/03/20 1900   Undermining (cm) 0 cm 08/03/20 1900   Shape oval    Wound Odor None    Exposed Structures None    Number of days: 2          Vascular:    AGUSTINA:   AGUSTINA Results, Last 30 Days Us-agustina Single Level Bilat    Result Date: 8/1/2020  \"RIGHT      Waveform            Systolic BPs (mmHg)                              161           Brachial   Triphasic                                Common Femoral   Triphasic                  153           Posterior Tibial   Triphasic                  150           Dorsalis Pedis                                            Peroneal                              0.95          AGUSTINA                                            TBI                           LEFT   Waveform        Systolic BPs (mmHg)                              154           Brachial   Triphasic                                Common Femoral   Triphasic                  168           Posterior Tibial   Triphasic                  168           Dorsalis Pedis                                            Peroneal                              1.04          AGUSTINA                                            TBI         Findings   Bilateral.    Doppler waveforms of the common femoral, popliteal, posterior tibial, and    dorsalis pedis arteries are of high amplitude and triphasic.    Ankle-brachial indices are normal.      PPG waveforms were obtained for all toes:   Flatlined PPG waveforms in the right 4th and 5th digits consistent with    severe ischemia, likely embolic " "in nature.   PPG waveforms are normal in all remaining digits.\"      Lab Values:    Lab Results   Component Value Date/Time    WBC 10.9 (H) 08/03/2020 12:01 AM    RBC 4.95 08/03/2020 12:01 AM    HEMOGLOBIN 15.1 08/03/2020 12:01 AM    HEMATOCRIT 45.0 08/03/2020 12:01 AM    CREACTPROT 6.23 (H) 08/01/2020 05:24 AM    SEDRATEWES 45 (H) 08/01/2020 05:24 AM    HBA1C 10.4 (H) 08/01/2020 05:24 AM          Culture: na  Culture Results show:  No results found for this or any previous visit (from the past 720 hour(s)).      INTERVENTIONS BY WOUND TEAM:  Palpated DP pulses. Cleaned web space between 4-5, 3-4  and 1-2 toes. Made sure to dry well. Painted 5th toe with iodine pad, let air-dry. Callus debrided between 1-2 toes with curette and forceps scissors revealinf red/pink wound bed. No c/o pain or bleeding. Cleaned wound again. Applied piece of HFB ready with hypafix to secure.     Interdisciplinary consultation: Patient, Bedside RN     EVALUATION: Pt has cool 4 & 5 toes R foot. Per AGUSTINA possibly embolic in nature. There is slight odor to 5th toe. Iodine used to keep dry and provide antimicrobial. Hydrofiber placed between 4-5 toes to keep dry. Callus to plantar web space between 1-2 toes with purple discoloration and fissure. Removed callus to reveal wound. Hydrofera Blue applied for the hydrophilic polyurethane foam which contains ethylene oxide used as a bactericidal, fungicidal, and sporicidal disinfectant. Hydrofera Blue also aids in maintaining a moist wound environment. The absorption properties of this dressing are important in collecting exudates and bacteria from the injured area. These harmful fluid secretions bind to the dressing removing it from the wound without the foam sticking to the wound causing more harm.      Goals: Steady decrease in wound area and depth weekly.    NURSING PLAN OF CARE ORDERS (X):    Dressing changes: See Dressing Care orders: x  Skin care: See Skin Care orders: x  Rectal tube care: See " Rectal Tube Care orders:   Other orders:    RSKIN:   CURRENTLY IN PLACE (X), APPLIED THIS VISIT (A), ORDERED (O):   Pt performs self care  Q shift Tacho: x   Q shift pressure point assessments:    Pressure redistribution mattress            Low Airloss          Bariatric IKE         Bariatric foam           Heel float boots     Heel Silicone dressing        Float Heels off Bed with Pillows               Barrier wipes         Barrier Cream         Barrier paste          Sacral silicone dressing         Silicone O2 tubing         Anchorfast         Cannula fixation Device (Tender )          Gray Foam Ear protectors           Trach with Optifoam split foam                 Waffle cushion        Waffle Overlay         Rectal tube or BMS    Purwick/Condom Cath          Antifungal tx      Interdry          Reposition q 2 hours     Pt performs   Up to chair        Ambulate      PT/OT        Dietician        Diabetes Education      PO  x   TF     TPN     NPO   # days   Other        WOUND TEAM PLAN OF CARE:  Dressing changes by wound team:          Follow up 1-2 times weekly:               Follow up 3 times weekly:                NPWT change 3 times weekly:     Follow up as needed:   PRN nsg to notify if wound worsens or fails to progress    Other (explain):     Anticipated discharge plans:  LTACH:        SNF/Rehab:                  Home Care:           Outpatient Wound Center:            Self Care:            Other: Unsure of needs @ this time

## 2020-08-04 NOTE — DISCHARGE PLANNING
Anticipated Discharge Disposition: Home    Action: SW to jeronimo review. Per notes, orthopedic surgeon waiting for 5th toe to declare itself.    Barriers to Discharge: None per care coordiantion    Plan: Follow up in rounds w/ treatment team.

## 2020-08-04 NOTE — PROGRESS NOTES
Received shift handoff report from Clarita PIERRE. Pt is in bed and stable. Bed locked and in lowest position, upper side rails up, call light in reach, whiteboard updated. POC discussed and pt verbalizes understanding.

## 2020-08-04 NOTE — CARE PLAN
Problem: Bowel/Gastric:  Goal: Normal bowel function is maintained or improved  Outcome: PROGRESSING AS EXPECTED  Intervention: Educate patient and significant other/support system about diet, fluid intake, medications and activity to promote bowel function  Note: Patient with regular bowel movements. Adequate appetite.     Problem: Knowledge Deficit  Goal: Knowledge of the prescribed therapeutic regimen will improve  Outcome: PROGRESSING AS EXPECTED  Intervention: Discuss information regarding therpeutic regimen and document in education  Note: IDT teams involved with coordinating goals of care and communicating with patient.

## 2020-08-04 NOTE — PROGRESS NOTES
Hospital Medicine Daily Progress Note    Date of Service  8/4/2020    Chief Complaint  Right foot pain    Hospital Course   Mr. Eugene is a 54-year-old male with a past medical history of uncontrolled diabetes mellitus and congenital single kidney who presented to the emergency department on 7/31/2020 with complaints of increasing pain to his right foot including discoloration to his fourth and fifth toes after he sustained some form of injury to it a few weeks prior.  He was diagnosed with a diabetic foot infection and sepsis and subsequently started on IV fluids, IV antibiotics, and admitted to the hospital for further evaluation and treatment. ABIs were done and identified embolic ischemia of the toes. Echocardiogram was negative for a cardiac source and had normal systolic and diastolic function. He was started on a heparin drip and orthopedic surgery was consulted. They are hopeful that IV antibiotics and anticoagulation will salvage the toes and prevent the need for amputation.       Interval Problem Update  Feeling good today, toes appear to have slightly more perfusion on exam. Diarrhea subsiding. Denies abd pain, no N/V.     WBC continues to trend down, now near normal at 10.9. BMP still with normal renal function & electrolyte levels.   Blood sugars still relatively well controlled, there were a couple of elevated readings since dc'ing preprandial insulin yesterday. Lantus increased.     Afebrile overnight, HR 80s-90s, SBP improved to 140s-150s after starting lisinopril yesterday, O2 sats remain WNL on room air.     Consultants/Specialty  Orthopedic surgery - Dr. Mallory    Code Status  Full code    Disposition  Anticipate discharge home later this week if stays clinically stable.     Review of Systems  Review of Systems   Constitutional: Negative for chills, fever and malaise/fatigue.   Respiratory: Negative for cough, shortness of breath and wheezing.    Cardiovascular: Negative for chest pain,  palpitations, orthopnea, leg swelling and PND.   Gastrointestinal: Positive for diarrhea (subsiding now). Negative for abdominal pain, blood in stool, constipation, melena, nausea and vomiting.   Genitourinary: Negative for dysuria, frequency and urgency.   Musculoskeletal:        4th & 5th toe pain with palpation.  Bilat foot pain, chronic, neuropathic.    Neurological: Negative for dizziness, tingling, sensory change, speech change, focal weakness, weakness and headaches.        + neuropathic in bilat feet   Psychiatric/Behavioral: Negative for depression. The patient is not nervous/anxious and does not have insomnia.    All other systems reviewed and are negative.     Physical Exam  Temp:  [36.6 °C (97.8 °F)-36.6 °C (97.9 °F)] 36.6 °C (97.8 °F)  Pulse:  [] 102  Resp:  [16-18] 18  BP: (119-140)/(76-94) 130/94  SpO2:  [91 %-94 %] 94 %    Physical Exam  Vitals signs and nursing note reviewed.   Constitutional:       General: He is awake.      Appearance: Normal appearance. He is well-developed. He is obese. He is not ill-appearing.   HENT:      Head: Normocephalic and atraumatic.      Mouth/Throat:      Lips: Pink.      Mouth: Mucous membranes are moist.   Eyes:      Conjunctiva/sclera: Conjunctivae normal.      Pupils: Pupils are equal, round, and reactive to light.   Neck:      Musculoskeletal: Normal range of motion and neck supple.   Cardiovascular:      Rate and Rhythm: Normal rate and regular rhythm.      Pulses: Normal pulses.      Heart sounds: Normal heart sounds.   Pulmonary:      Effort: Pulmonary effort is normal.      Breath sounds: Normal breath sounds.   Abdominal:      General: Abdomen is protuberant. Bowel sounds are normal. There is no distension or abdominal bruit.      Palpations: Abdomen is soft.      Tenderness: There is no abdominal tenderness.   Musculoskeletal:      Right lower leg: No edema.      Left lower leg: No edema.   Feet:      Right foot:      Skin integrity: Skin breakdown  (medial side of 4th toe) present.      Comments: Toes # 4 & #5 on the right foot are less cyanotic today and cap refill is improved.   Ulcer present on the inner side of the 5th toe on the right foot, stable from yesterday.   Skin:     General: Skin is warm and dry.      Capillary Refill: Capillary refill takes 2 to 3 seconds.      Coloration: Skin is cyanotic (right toes #4 & #5, mildly improved today).      Findings: Wound (Right 5th toe medial ulcer) present.   Neurological:      General: No focal deficit present.      Mental Status: He is alert and oriented to person, place, and time.      GCS: GCS eye subscore is 4. GCS verbal subscore is 5. GCS motor subscore is 6.   Psychiatric:         Attention and Perception: Attention and perception normal.         Mood and Affect: Mood and affect normal.         Speech: Speech normal.         Behavior: Behavior normal. Behavior is cooperative.         Thought Content: Thought content normal.         Cognition and Memory: Cognition and memory normal.         Judgment: Judgment normal.      Comments: Very pleasant.      Fluids    Intake/Output Summary (Last 24 hours) at 8/4/2020 2123  Last data filed at 8/4/2020 0711  Gross per 24 hour   Intake 521.87 ml   Output --   Net 521.87 ml     Laboratory  Recent Labs     08/02/20  0455 08/03/20  0001 08/04/20  0254   WBC 11.3* 10.9* 10.2   RBC 4.96 4.95 4.84   HEMOGLOBIN 15.1 15.1 14.9   HEMATOCRIT 44.6 45.0 44.4   MCV 89.9 90.9 91.7   MCH 30.4 30.5 30.8   MCHC 33.9 33.6* 33.6*   RDW 41.1 42.3 43.6   PLATELETCT 287 279 312   MPV 9.8 10.0 10.5     Recent Labs     08/02/20  0455 08/03/20  0001 08/04/20  0254   SODIUM 136 136 137   POTASSIUM 4.7 4.2 4.0   CHLORIDE 104 103 103   CO2 24 24 20   GLUCOSE 137* 102* 86   BUN 11 17 16   CREATININE 0.83 1.07 0.91   CALCIUM 8.8 8.5 8.7     Recent Labs     08/04/20  0254 08/04/20  1318 08/04/20  1916   APTT 52.1* 63.5* 59.9*     Imaging  EC-ECHOCARDIOGRAM COMPLETE W/ CONT   Final Result       US-AGUSTINA SINGLE LEVEL BILAT   Final Result      CT-FOOT W/O PLUS RECONS RIGHT   Final Result      1.  Soft tissue swelling and small skin ulcer in the plantar aspect of the right foot below the distal aspect of the proximal phalanx of the 2nd toe.      2.  No CT evidence of osteomyelitis.      3.  No subcutaneous abscess identified.      DX-FOOT-COMPLETE 3+ RIGHT   Final Result         1.  No acute traumatic bony injury.         Assessment/Plan  * Diabetic foot infection (HCC)- (present on admission)  Assessment & Plan  - Lab actually reported the wound culture as MRSA - but informed that this was actually MSSA plus acinetobacter.   - Changed Dapto+Rocephin to Unasyn. Covers both.   - Ortho following.   - Cautiously optimistic.     Ischemia of toes- (present on admission)  Assessment & Plan  - Started on heparin infusion.  - Monitoring closely for the need of amputation.     Essential hypertension- (present on admission)  Assessment & Plan  Continue to monitor closely.     Type 2 diabetes mellitus with hyperglycemia, without long-term current use of insulin (HCC)- (present on admission)  Assessment & Plan  - Continue diabetic diet.   - Monitor FSBS.   - Will adjust insulin further.      VTE prophylaxis: Heparin lizz Perez MD

## 2020-08-04 NOTE — CARE PLAN
Problem: Safety  Goal: Will remain free from injury  Outcome: PROGRESSING AS EXPECTED  Intervention: Provide assistance with mobility  Flowsheets (Taken 8/3/2020 1957)  Assistance: No Assistance Required  Ambulation Tolerance: Tolerates Well  Intervention: Collaborate with Interdisciplinary Team for safe transfer and mobilization techniques  Flowsheets (Taken 8/3/2020 1957)  Assistive Devices: None  Goal: Will remain free from falls  Outcome: PROGRESSING AS EXPECTED  Intervention: Assess risk factors for falls  Flowsheets  Taken 8/3/2020 1957  Pt Calls for Assistance:   Yes   No assistance required  Taken 8/2/2020 0117  History of fall: 0  Intervention: Implement fall precautions  Flowsheets (Taken 8/3/2020 1957)  Environmental Precautions:   Treaded Slipper Socks on Patient   Personal Belongings, Wastebasket, Call Bell etc. in Easy Reach   Transferred to Stronger Side   Report Given to Other Health Care Providers Regarding Fall Risk   Bed in Low Position   Communication Sign for Patients & Families   Mobility Assessed & Appropriate Sign Placed

## 2020-08-05 LAB
ANION GAP SERPL CALC-SCNC: 11 MMOL/L (ref 7–16)
APTT PPP: 59.4 SEC (ref 24.7–36)
BUN SERPL-MCNC: 13 MG/DL (ref 8–22)
CALCIUM SERPL-MCNC: 8.9 MG/DL (ref 8.4–10.2)
CHLORIDE SERPL-SCNC: 105 MMOL/L (ref 96–112)
CO2 SERPL-SCNC: 22 MMOL/L (ref 20–33)
CREAT SERPL-MCNC: 0.83 MG/DL (ref 0.5–1.4)
GLUCOSE BLD-MCNC: 112 MG/DL (ref 65–99)
GLUCOSE BLD-MCNC: 127 MG/DL (ref 65–99)
GLUCOSE BLD-MCNC: 143 MG/DL (ref 65–99)
GLUCOSE BLD-MCNC: 150 MG/DL (ref 65–99)
GLUCOSE BLD-MCNC: 194 MG/DL (ref 65–99)
GLUCOSE BLD-MCNC: 85 MG/DL (ref 65–99)
GLUCOSE BLD-MCNC: 96 MG/DL (ref 65–99)
GLUCOSE SERPL-MCNC: 90 MG/DL (ref 65–99)
MAGNESIUM SERPL-MCNC: 2.1 MG/DL (ref 1.5–2.5)
PHOSPHATE SERPL-MCNC: 3.3 MG/DL (ref 2.5–4.5)
POTASSIUM SERPL-SCNC: 4.3 MMOL/L (ref 3.6–5.5)
SODIUM SERPL-SCNC: 138 MMOL/L (ref 135–145)

## 2020-08-05 PROCEDURE — 700105 HCHG RX REV CODE 258: Performed by: INTERNAL MEDICINE

## 2020-08-05 PROCEDURE — 700111 HCHG RX REV CODE 636 W/ 250 OVERRIDE (IP): Performed by: INTERNAL MEDICINE

## 2020-08-05 PROCEDURE — 83735 ASSAY OF MAGNESIUM: CPT

## 2020-08-05 PROCEDURE — 700102 HCHG RX REV CODE 250 W/ 637 OVERRIDE(OP): Performed by: HOSPITALIST

## 2020-08-05 PROCEDURE — A9270 NON-COVERED ITEM OR SERVICE: HCPCS | Performed by: HOSPITALIST

## 2020-08-05 PROCEDURE — 85730 THROMBOPLASTIN TIME PARTIAL: CPT

## 2020-08-05 PROCEDURE — 80048 BASIC METABOLIC PNL TOTAL CA: CPT

## 2020-08-05 PROCEDURE — 700111 HCHG RX REV CODE 636 W/ 250 OVERRIDE (IP): Performed by: NURSE PRACTITIONER

## 2020-08-05 PROCEDURE — A9270 NON-COVERED ITEM OR SERVICE: HCPCS | Performed by: NURSE PRACTITIONER

## 2020-08-05 PROCEDURE — 99232 SBSQ HOSP IP/OBS MODERATE 35: CPT | Performed by: INTERNAL MEDICINE

## 2020-08-05 PROCEDURE — 82962 GLUCOSE BLOOD TEST: CPT

## 2020-08-05 PROCEDURE — 36415 COLL VENOUS BLD VENIPUNCTURE: CPT

## 2020-08-05 PROCEDURE — 700102 HCHG RX REV CODE 250 W/ 637 OVERRIDE(OP): Performed by: NURSE PRACTITIONER

## 2020-08-05 PROCEDURE — 84100 ASSAY OF PHOSPHORUS: CPT

## 2020-08-05 PROCEDURE — 770020 HCHG ROOM/CARE - TELE (206)

## 2020-08-05 RX ORDER — DIPHENHYDRAMINE HYDROCHLORIDE 50 MG/ML
25 INJECTION INTRAMUSCULAR; INTRAVENOUS
Status: DISCONTINUED | OUTPATIENT
Start: 2020-08-05 | End: 2020-08-10 | Stop reason: HOSPADM

## 2020-08-05 RX ORDER — EPINEPHRINE 1 MG/ML(1)
0.5 AMPUL (ML) INJECTION
Status: DISCONTINUED | OUTPATIENT
Start: 2020-08-05 | End: 2020-08-10 | Stop reason: HOSPADM

## 2020-08-05 RX ORDER — AMOXICILLIN 250 MG/1
500 CAPSULE ORAL ONCE
Status: COMPLETED | OUTPATIENT
Start: 2020-08-05 | End: 2020-08-05

## 2020-08-05 RX ADMIN — CEFTRIAXONE SODIUM 2 G: 2 INJECTION, POWDER, FOR SOLUTION INTRAMUSCULAR; INTRAVENOUS at 08:40

## 2020-08-05 RX ADMIN — LISINOPRIL 5 MG: 5 TABLET ORAL at 05:44

## 2020-08-05 RX ADMIN — INSULIN LISPRO 1 UNITS: 100 INJECTION, SOLUTION INTRAVENOUS; SUBCUTANEOUS at 11:18

## 2020-08-05 RX ADMIN — AMPICILLIN SODIUM AND SULBACTAM SODIUM 3 G: 2; 1 INJECTION, POWDER, FOR SOLUTION INTRAMUSCULAR; INTRAVENOUS at 18:14

## 2020-08-05 RX ADMIN — INSULIN GLARGINE 22 UNITS: 100 INJECTION, SOLUTION SUBCUTANEOUS at 18:12

## 2020-08-05 RX ADMIN — AMOXICILLIN 500 MG: 250 CAPSULE ORAL at 10:45

## 2020-08-05 RX ADMIN — HEPARIN SODIUM 1750 UNITS/HR: 5000 INJECTION, SOLUTION INTRAVENOUS at 12:54

## 2020-08-05 RX ADMIN — CLONIDINE HYDROCHLORIDE 0.1 MG: 0.1 TABLET ORAL at 08:40

## 2020-08-05 RX ADMIN — ACETAMINOPHEN 650 MG: 325 TABLET, FILM COATED ORAL at 18:21

## 2020-08-05 RX ADMIN — ACETAMINOPHEN 650 MG: 325 TABLET, FILM COATED ORAL at 05:44

## 2020-08-05 ASSESSMENT — ENCOUNTER SYMPTOMS
PALPITATIONS: 0
FOCAL WEAKNESS: 0
ABDOMINAL PAIN: 0
COUGH: 0
CONSTIPATION: 0
DEPRESSION: 0
DIZZINESS: 0
HEADACHES: 0
PND: 0
TINGLING: 0
FEVER: 0
NERVOUS/ANXIOUS: 0
SHORTNESS OF BREATH: 0
WEAKNESS: 0
ORTHOPNEA: 0
CHILLS: 0
SENSORY CHANGE: 0
SPEECH CHANGE: 0
NAUSEA: 0
INSOMNIA: 0
VOMITING: 0
WHEEZING: 0
BLOOD IN STOOL: 0

## 2020-08-05 NOTE — PROGRESS NOTES
Received report from GABBY Otto. Safety precautions in place. Bed locked and low. Offered assist. Call light and belongings within reach.

## 2020-08-05 NOTE — ASSESSMENT & PLAN NOTE
- Discussed with Dr Bagley and recommended restarting heparin infusion for a few more days.  - Did not feel the need for chronic AC.  - Recommended ASA.

## 2020-08-05 NOTE — PROGRESS NOTES
Telemetry Shift Summary     Rhythm SR-ST  HR Range   Ectopy O-PVC, R-PAC  Measurements 0.16/0.08/0.30      PSVT up to 170s for 4.63 sec. MD made aware. Lab orders received.     Normal Values  Rhythm SR  HR Range    Measurements 0.12-0.20 / 0.06-0.10  / 0.30-0.52

## 2020-08-05 NOTE — PROGRESS NOTES
[facility] Medicine Daily Progress Note    Date of Service  8/5/2020    Chief Complaint  Right foot pain    Hospital Course   Mr. Eugene is a 54-year-old male with a past medical history of uncontrolled diabetes mellitus and congenital single kidney who presented to the emergency department on 7/31/2020 with complaints of increasing pain to his right foot including discoloration to his fourth and fifth toes after he sustained some form of injury to it a few weeks prior.  He was diagnosed with a diabetic foot infection and sepsis and subsequently started on IV fluids, IV antibiotics, and admitted to the hospital for further evaluation and treatment. ABIs were done and identified embolic ischemia of the toes. Echocardiogram was negative for a cardiac source and had normal systolic and diastolic function. He was started on a heparin drip and orthopedic surgery was consulted. They are hopeful that IV antibiotics and anticoagulation will salvage the toes and prevent the need for amputation.       Interval Problem Update  No overnight issues.   Doing well.   Similar exam of the toe as yesterday.  No significant improvement noted by me.    Consultants/Specialty  Orthopedic surgery - Dr. Mallory    Code Status  Full code    Disposition  Anticipate discharge home later this week if stays clinically stable.     Review of Systems  Review of Systems   Constitutional: Negative for chills, fever and malaise/fatigue.   Respiratory: Negative for cough, shortness of breath and wheezing.    Cardiovascular: Negative for chest pain, palpitations, orthopnea, leg swelling and PND.   Gastrointestinal: Negative for abdominal pain, blood in stool, constipation, melena, nausea and vomiting. Diarrhea: subsiding now.   Genitourinary: Negative for dysuria, frequency and urgency.   Musculoskeletal:        4th & 5th toe pain with palpation.  Bilat foot pain, chronic, neuropathic.    Neurological: Negative for dizziness, tingling, sensory change,  speech change, focal weakness, weakness and headaches.        + neuropathic in bilat feet   Psychiatric/Behavioral: Negative for depression. The patient is not nervous/anxious and does not have insomnia.    All other systems reviewed and are negative.     Physical Exam  Temp:  [36.4 °C (97.6 °F)-36.8 °C (98.2 °F)] 36.4 °C (97.6 °F)  Pulse:  [] 104  Resp:  [16-18] 18  BP: (119-174)/(71-94) 174/92  SpO2:  [92 %-95 %] 95 %    Physical Exam  Vitals signs and nursing note reviewed.   Constitutional:       General: He is awake.      Appearance: Normal appearance. He is well-developed. He is obese. He is not ill-appearing.   HENT:      Head: Normocephalic and atraumatic.      Mouth/Throat:      Lips: Pink.      Mouth: Mucous membranes are moist.   Eyes:      Conjunctiva/sclera: Conjunctivae normal.      Pupils: Pupils are equal, round, and reactive to light.   Neck:      Musculoskeletal: Normal range of motion and neck supple.   Cardiovascular:      Rate and Rhythm: Normal rate and regular rhythm.      Pulses: Normal pulses.      Heart sounds: Normal heart sounds.   Pulmonary:      Effort: Pulmonary effort is normal.      Breath sounds: Normal breath sounds.   Abdominal:      General: Abdomen is protuberant. Bowel sounds are normal. There is no distension or abdominal bruit.      Palpations: Abdomen is soft.      Tenderness: There is no abdominal tenderness.   Musculoskeletal:      Right lower leg: No edema.      Left lower leg: No edema.   Feet:      Right foot:      Skin integrity: Skin breakdown (medial side of 4th toe) present.      Comments: Toes # 4 & #5 on the right foot are less cyanotic today and cap refill is improved.   Ulcer present on the inner side of the 5th toe on the right foot, stable from yesterday.   Skin:     General: Skin is warm and dry.      Capillary Refill: Capillary refill takes 2 to 3 seconds.      Coloration: Skin is cyanotic (right toes #4 & #5, mildly improved today).      Findings:  Wound (Right 5th toe medial ulcer) present.   Neurological:      General: No focal deficit present.      Mental Status: He is alert and oriented to person, place, and time.      GCS: GCS eye subscore is 4. GCS verbal subscore is 5. GCS motor subscore is 6.   Psychiatric:         Attention and Perception: Attention and perception normal.         Mood and Affect: Mood and affect normal.         Speech: Speech normal.         Behavior: Behavior normal. Behavior is cooperative.         Thought Content: Thought content normal.         Cognition and Memory: Cognition and memory normal.         Judgment: Judgment normal.      Comments: Very pleasant.      Fluids  No intake or output data in the 24 hours ending 08/05/20 1020  Laboratory  Recent Labs     08/03/20  0001 08/04/20  0254   WBC 10.9* 10.2   RBC 4.95 4.84   HEMOGLOBIN 15.1 14.9   HEMATOCRIT 45.0 44.4   MCV 90.9 91.7   MCH 30.5 30.8   MCHC 33.6* 33.6*   RDW 42.3 43.6   PLATELETCT 279 312   MPV 10.0 10.5     Recent Labs     08/03/20  0001 08/04/20  0254 08/05/20  0428   SODIUM 136 137 138   POTASSIUM 4.2 4.0 4.3   CHLORIDE 103 103 105   CO2 24 20 22   GLUCOSE 102* 86 90   BUN 17 16 13   CREATININE 1.07 0.91 0.83   CALCIUM 8.5 8.7 8.9     Recent Labs     08/04/20  0254 08/04/20  1318 08/04/20  1916   APTT 52.1* 63.5* 59.9*     Imaging  EC-ECHOCARDIOGRAM COMPLETE W/ CONT   Final Result      US-AGUSTINA SINGLE LEVEL BILAT   Final Result      CT-FOOT W/O PLUS RECONS RIGHT   Final Result      1.  Soft tissue swelling and small skin ulcer in the plantar aspect of the right foot below the distal aspect of the proximal phalanx of the 2nd toe.      2.  No CT evidence of osteomyelitis.      3.  No subcutaneous abscess identified.      DX-FOOT-COMPLETE 3+ RIGHT   Final Result         1.  No acute traumatic bony injury.         Assessment/Plan  * Diabetic foot infection (HCC)- (present on admission)  Assessment & Plan  - Lab actually reported the wound culture as MRSA - but informed  that this was actually MSSA plus acinetobacter.   - Changed Dapto+Rocephin to Unasyn. Covers both.   - Ortho following.   - Cautiously optimistic.     Ischemia of toes- (present on admission)  Assessment & Plan  - Started on heparin infusion.  - Monitoring closely for the need of amputation.     Essential hypertension- (present on admission)  Assessment & Plan  Continue to monitor closely.     Type 2 diabetes mellitus with hyperglycemia, without long-term current use of insulin (HCC)- (present on admission)  Assessment & Plan  - Continue diabetic diet.   - Monitor FSBS.   - Sugars much better controlled now.      VTE prophylaxis: Heparin drasa Perez MD

## 2020-08-05 NOTE — ASSESSMENT & PLAN NOTE
- Wound cultures positive for MSSA plus acinetobacter.   - Changed Dapto+Rocephin to Unasyn. Covered both. Switched to oral augmentin.   - Ortho following.   - Discussed case with Dr Mallory. Agreed with oral abx.   - Recommended continuing AC for a few more days.

## 2020-08-05 NOTE — PROGRESS NOTES
Nursing assessment per chart, Pt sitting up EOB, denies pain, wound to right toes assessed, dressing CD&I, Pt medicated for elevated /92, Pt VU, POC for day reviewed with Pt.

## 2020-08-05 NOTE — PROGRESS NOTES
Telemetry Shift Summary    Rhythm SR/ST  HR Range 90s-110s  Ectopy occasional PVC  Measurements 0.14/0.08/0.32        Normal Values  Rhythm SR  HR Range    Measurements 0.12-0.20 / 0.06-0.10  / 0.30-0.52

## 2020-08-06 ENCOUNTER — ANESTHESIA EVENT (OUTPATIENT)
Dept: SURGERY | Facility: MEDICAL CENTER | Age: 55
DRG: 854 | End: 2020-08-06
Payer: COMMERCIAL

## 2020-08-06 ENCOUNTER — ANESTHESIA (OUTPATIENT)
Dept: SURGERY | Facility: MEDICAL CENTER | Age: 55
DRG: 854 | End: 2020-08-06
Payer: COMMERCIAL

## 2020-08-06 LAB
APTT PPP: 30.3 SEC (ref 24.7–36)
BACTERIA BLD CULT: NORMAL
BACTERIA BLD CULT: NORMAL
GLUCOSE BLD-MCNC: 105 MG/DL (ref 65–99)
GLUCOSE BLD-MCNC: 117 MG/DL (ref 65–99)
GLUCOSE BLD-MCNC: 84 MG/DL (ref 65–99)
GLUCOSE BLD-MCNC: 85 MG/DL (ref 65–99)
SIGNIFICANT IND 70042: NORMAL
SIGNIFICANT IND 70042: NORMAL
SITE SITE: NORMAL
SITE SITE: NORMAL
SOURCE SOURCE: NORMAL
SOURCE SOURCE: NORMAL

## 2020-08-06 PROCEDURE — A9270 NON-COVERED ITEM OR SERVICE: HCPCS | Performed by: HOSPITALIST

## 2020-08-06 PROCEDURE — 160038 HCHG SURGERY MINUTES - EA ADDL 1 MIN LEVEL 2: Performed by: ORTHOPAEDIC SURGERY

## 2020-08-06 PROCEDURE — 160036 HCHG PACU - EA ADDL 30 MINS PHASE I: Performed by: ORTHOPAEDIC SURGERY

## 2020-08-06 PROCEDURE — 160009 HCHG ANES TIME/MIN: Performed by: ORTHOPAEDIC SURGERY

## 2020-08-06 PROCEDURE — 0Y6X0Z0 DETACHMENT AT RIGHT 5TH TOE, COMPLETE, OPEN APPROACH: ICD-10-PCS | Performed by: ORTHOPAEDIC SURGERY

## 2020-08-06 PROCEDURE — 700111 HCHG RX REV CODE 636 W/ 250 OVERRIDE (IP): Performed by: INTERNAL MEDICINE

## 2020-08-06 PROCEDURE — 700105 HCHG RX REV CODE 258: Performed by: ORTHOPAEDIC SURGERY

## 2020-08-06 PROCEDURE — 85730 THROMBOPLASTIN TIME PARTIAL: CPT

## 2020-08-06 PROCEDURE — 501838 HCHG SUTURE GENERAL: Performed by: ORTHOPAEDIC SURGERY

## 2020-08-06 PROCEDURE — 500881 HCHG PACK, EXTREMITY: Performed by: ORTHOPAEDIC SURGERY

## 2020-08-06 PROCEDURE — 700102 HCHG RX REV CODE 250 W/ 637 OVERRIDE(OP): Performed by: NURSE PRACTITIONER

## 2020-08-06 PROCEDURE — 770020 HCHG ROOM/CARE - TELE (206)

## 2020-08-06 PROCEDURE — 82962 GLUCOSE BLOOD TEST: CPT

## 2020-08-06 PROCEDURE — 160027 HCHG SURGERY MINUTES - 1ST 30 MINS LEVEL 2: Performed by: ORTHOPAEDIC SURGERY

## 2020-08-06 PROCEDURE — 160048 HCHG OR STATISTICAL LEVEL 1-5: Performed by: ORTHOPAEDIC SURGERY

## 2020-08-06 PROCEDURE — 99231 SBSQ HOSP IP/OBS SF/LOW 25: CPT | Performed by: INTERNAL MEDICINE

## 2020-08-06 PROCEDURE — 700102 HCHG RX REV CODE 250 W/ 637 OVERRIDE(OP): Performed by: HOSPITALIST

## 2020-08-06 PROCEDURE — 700105 HCHG RX REV CODE 258: Performed by: INTERNAL MEDICINE

## 2020-08-06 PROCEDURE — 160002 HCHG RECOVERY MINUTES (STAT): Performed by: ORTHOPAEDIC SURGERY

## 2020-08-06 PROCEDURE — 700111 HCHG RX REV CODE 636 W/ 250 OVERRIDE (IP): Performed by: ANESTHESIOLOGY

## 2020-08-06 PROCEDURE — A9270 NON-COVERED ITEM OR SERVICE: HCPCS | Performed by: NURSE PRACTITIONER

## 2020-08-06 PROCEDURE — 160035 HCHG PACU - 1ST 60 MINS PHASE I: Performed by: ORTHOPAEDIC SURGERY

## 2020-08-06 RX ORDER — ONDANSETRON 2 MG/ML
4 INJECTION INTRAMUSCULAR; INTRAVENOUS
Status: DISCONTINUED | OUTPATIENT
Start: 2020-08-06 | End: 2020-08-06 | Stop reason: HOSPADM

## 2020-08-06 RX ORDER — HYDROMORPHONE HYDROCHLORIDE 1 MG/ML
0.1 INJECTION, SOLUTION INTRAMUSCULAR; INTRAVENOUS; SUBCUTANEOUS
Status: DISCONTINUED | OUTPATIENT
Start: 2020-08-06 | End: 2020-08-06 | Stop reason: HOSPADM

## 2020-08-06 RX ORDER — DIPHENHYDRAMINE HYDROCHLORIDE 50 MG/ML
12.5 INJECTION INTRAMUSCULAR; INTRAVENOUS
Status: DISCONTINUED | OUTPATIENT
Start: 2020-08-06 | End: 2020-08-06 | Stop reason: HOSPADM

## 2020-08-06 RX ORDER — HYDROMORPHONE HYDROCHLORIDE 1 MG/ML
0.2 INJECTION, SOLUTION INTRAMUSCULAR; INTRAVENOUS; SUBCUTANEOUS
Status: DISCONTINUED | OUTPATIENT
Start: 2020-08-06 | End: 2020-08-06 | Stop reason: HOSPADM

## 2020-08-06 RX ORDER — ONDANSETRON 2 MG/ML
INJECTION INTRAMUSCULAR; INTRAVENOUS PRN
Status: DISCONTINUED | OUTPATIENT
Start: 2020-08-06 | End: 2020-08-06 | Stop reason: SURG

## 2020-08-06 RX ORDER — HYDROMORPHONE HYDROCHLORIDE 1 MG/ML
0.4 INJECTION, SOLUTION INTRAMUSCULAR; INTRAVENOUS; SUBCUTANEOUS
Status: DISCONTINUED | OUTPATIENT
Start: 2020-08-06 | End: 2020-08-06 | Stop reason: HOSPADM

## 2020-08-06 RX ORDER — SODIUM CHLORIDE, SODIUM LACTATE, POTASSIUM CHLORIDE, CALCIUM CHLORIDE 600; 310; 30; 20 MG/100ML; MG/100ML; MG/100ML; MG/100ML
INJECTION, SOLUTION INTRAVENOUS CONTINUOUS
Status: ACTIVE | OUTPATIENT
Start: 2020-08-06 | End: 2020-08-07

## 2020-08-06 RX ORDER — SODIUM CHLORIDE, SODIUM LACTATE, POTASSIUM CHLORIDE, CALCIUM CHLORIDE 600; 310; 30; 20 MG/100ML; MG/100ML; MG/100ML; MG/100ML
INJECTION, SOLUTION INTRAVENOUS CONTINUOUS
Status: DISCONTINUED | OUTPATIENT
Start: 2020-08-06 | End: 2020-08-06 | Stop reason: HOSPADM

## 2020-08-06 RX ORDER — AMPICILLIN AND SULBACTAM 2; 1 G/1; G/1
INJECTION, POWDER, FOR SOLUTION INTRAMUSCULAR; INTRAVENOUS PRN
Status: DISCONTINUED | OUTPATIENT
Start: 2020-08-06 | End: 2020-08-06 | Stop reason: SURG

## 2020-08-06 RX ORDER — HALOPERIDOL 5 MG/ML
1 INJECTION INTRAMUSCULAR
Status: DISCONTINUED | OUTPATIENT
Start: 2020-08-06 | End: 2020-08-06 | Stop reason: HOSPADM

## 2020-08-06 RX ADMIN — FENTANYL CITRATE 100 MCG: 50 INJECTION, SOLUTION INTRAMUSCULAR; INTRAVENOUS at 17:09

## 2020-08-06 RX ADMIN — AMPICILLIN SODIUM AND SULBACTAM SODIUM 3 G: 2; 1 INJECTION, POWDER, FOR SOLUTION INTRAMUSCULAR; INTRAVENOUS at 23:36

## 2020-08-06 RX ADMIN — AMPICILLIN SODIUM AND SULBACTAM SODIUM 3 G: 2; 1 INJECTION, POWDER, FOR SOLUTION INTRAMUSCULAR; INTRAVENOUS at 11:36

## 2020-08-06 RX ADMIN — PROPOFOL 200 MG: 10 INJECTION, EMULSION INTRAVENOUS at 17:09

## 2020-08-06 RX ADMIN — ONDANSETRON 4 MG: 2 INJECTION INTRAMUSCULAR; INTRAVENOUS at 17:51

## 2020-08-06 RX ADMIN — AMPICILLIN SODIUM AND SULBACTAM SODIUM 3 G: 2; 1 INJECTION, POWDER, FOR SOLUTION INTRAMUSCULAR; INTRAVENOUS at 00:20

## 2020-08-06 RX ADMIN — FENTANYL CITRATE 50 MCG: 50 INJECTION, SOLUTION INTRAMUSCULAR; INTRAVENOUS at 17:43

## 2020-08-06 RX ADMIN — ACETAMINOPHEN 650 MG: 325 TABLET, FILM COATED ORAL at 23:42

## 2020-08-06 RX ADMIN — FENTANYL CITRATE 50 MCG: 50 INJECTION, SOLUTION INTRAMUSCULAR; INTRAVENOUS at 17:19

## 2020-08-06 RX ADMIN — AMPICILLIN SODIUM AND SULBACTAM SODIUM 3 G: 2; 1 INJECTION, POWDER, FOR SOLUTION INTRAMUSCULAR; INTRAVENOUS at 17:15

## 2020-08-06 RX ADMIN — ACETAMINOPHEN 650 MG: 325 TABLET, FILM COATED ORAL at 00:25

## 2020-08-06 RX ADMIN — LISINOPRIL 5 MG: 5 TABLET ORAL at 05:53

## 2020-08-06 RX ADMIN — SODIUM CHLORIDE, POTASSIUM CHLORIDE, SODIUM LACTATE AND CALCIUM CHLORIDE: 600; 310; 30; 20 INJECTION, SOLUTION INTRAVENOUS at 17:04

## 2020-08-06 RX ADMIN — AMPICILLIN SODIUM AND SULBACTAM SODIUM 3 G: 2; 1 INJECTION, POWDER, FOR SOLUTION INTRAMUSCULAR; INTRAVENOUS at 05:53

## 2020-08-06 ASSESSMENT — ENCOUNTER SYMPTOMS
DEPRESSION: 0
SENSORY CHANGE: 0
ORTHOPNEA: 0
NAUSEA: 0
WEAKNESS: 0
CONSTIPATION: 0
WHEEZING: 0
TINGLING: 0
VOMITING: 0
FOCAL WEAKNESS: 0
PALPITATIONS: 0
PND: 0
CHILLS: 0
COUGH: 0
ABDOMINAL PAIN: 0
BLOOD IN STOOL: 0
FEVER: 0
HEADACHES: 0
NERVOUS/ANXIOUS: 0
SPEECH CHANGE: 0
INSOMNIA: 0
DIZZINESS: 0
SHORTNESS OF BREATH: 0

## 2020-08-06 ASSESSMENT — COGNITIVE AND FUNCTIONAL STATUS - GENERAL
DAILY ACTIVITIY SCORE: 24
SUGGESTED CMS G CODE MODIFIER DAILY ACTIVITY: CH
SUGGESTED CMS G CODE MODIFIER MOBILITY: CH
MOBILITY SCORE: 24

## 2020-08-06 NOTE — PROGRESS NOTES
Monitor Summary     Rhythm:SR 89   Measurements: 0.14/0.08/0.36  ECTOPIES: F PVC        Normal Values  Rhythm SR  HR Range    Measurements 0.12-0.20 / 0.06-0.10  / 0.30-0.52

## 2020-08-06 NOTE — PROGRESS NOTES
1920 Bedside report received from TEVIN RN. Assumed care. POC discussed. Pt resting comfortably in bed. Safety precautions in place.    2100 Assessment completed, patient A&Ox4, no c/o pain. , no insulin needed. Heparin gtt verified. No other needs at this time.

## 2020-08-06 NOTE — PROGRESS NOTES
Telemetry Shift Summary    Rhythm SR/ST  HR Range   Ectopy rPVC  Measurements 0.16/0.08/0.36        Normal Values  Rhythm SR  HR Range    Measurements 0.12-0.20 / 0.06-0.10  / 0.30-0.52

## 2020-08-06 NOTE — PROGRESS NOTES
All medications given as ordered. Patients BS 84, no insulin required. Called down to kitchen to get early tray delivered to patient before NPO at 0700.

## 2020-08-06 NOTE — PROGRESS NOTES
Bedside report given to Demetra PIERRE. POC discussed. Pt resting comfortably in bed. Safety precautions in place.

## 2020-08-06 NOTE — ANESTHESIA PREPROCEDURE EVALUATION
53 y/o male with type II DM, HTN, ischemic right 5th toe with infection; no problems with anesthesia in the past, no CP, SOB, MI, + marajuana use, no cigarettes/EtOH.    Relevant Problems   CARDIAC   (+) Essential hypertension   (+) Ischemia of toes      ENDO   (+) Type 2 diabetes mellitus with hyperglycemia, without long-term current use of insulin (HCC)       Physical Exam    Airway   Mallampati: II  TM distance: >3 FB  Neck ROM: full       Cardiovascular - normal exam  Rhythm: regular  Rate: normal  (-) murmur     Dental   (+) upper dentures, lower dentures           Pulmonary - normal exam  Breath sounds clear to auscultation     Abdominal    Neurological - normal exam                 Anesthesia Plan    ASA 3- EMERGENT   ASA physical status 3 criteria: diabetes - poorly controlledASA physical status emergent criteria: acutely contaminated wound or identified infection source    Plan - general       Airway plan will be LMA        Induction: intravenous      Pertinent diagnostic labs and testing reviewed    Informed Consent:  Emergent - Consent given by clinician  Anesthetic plan and risks discussed with patient.

## 2020-08-06 NOTE — PROGRESS NOTES
[facility] Medicine Daily Progress Note    Date of Service  8/6/2020    Chief Complaint  Right foot pain    Hospital Course   Mr. Eugene is a 54-year-old male with a past medical history of uncontrolled diabetes mellitus and congenital single kidney who presented to the emergency department on 7/31/2020 with complaints of increasing pain to his right foot including discoloration to his fourth and fifth toes after he sustained some form of injury to it a few weeks prior.  He was diagnosed with a diabetic foot infection and sepsis and subsequently started on IV fluids, IV antibiotics, and admitted to the hospital for further evaluation and treatment. ABIs were done and identified embolic ischemia of the toes. Echocardiogram was negative for a cardiac source and had normal systolic and diastolic function. He was started on a heparin drip and orthopedic surgery was consulted. They are hopeful that IV antibiotics and anticoagulation will salvage the toes and prevent the need for amputation.       Interval Problem Update  No overnight issues.   Doing well.   Similar exam of the toe as yesterday.  No significant improvement noted by me.  Noted surgery plans to take him to OR today.     Consultants/Specialty  Orthopedic surgery - Dr. Mallory    Code Status  Full code    Disposition  Anticipate discharge home later this week if stays clinically stable.     Review of Systems  Review of Systems   Constitutional: Negative for chills, fever and malaise/fatigue.   Respiratory: Negative for cough, shortness of breath and wheezing.    Cardiovascular: Negative for chest pain, palpitations, orthopnea, leg swelling and PND.   Gastrointestinal: Negative for abdominal pain, blood in stool, constipation, melena, nausea and vomiting. Diarrhea: subsiding now.   Genitourinary: Negative for dysuria, frequency and urgency.   Musculoskeletal:        4th & 5th toe pain with palpation.  Bilat foot pain, chronic, neuropathic.    Neurological:  Negative for dizziness, tingling, sensory change, speech change, focal weakness, weakness and headaches.        + neuropathic in bilat feet   Psychiatric/Behavioral: Negative for depression. The patient is not nervous/anxious and does not have insomnia.    All other systems reviewed and are negative.     Physical Exam  Temp:  [36.6 °C (97.8 °F)-36.7 °C (98.1 °F)] 36.7 °C (98.1 °F)  Pulse:  [86-96] 96  Resp:  [18] 18  BP: (128-162)/(75-93) 146/90  SpO2:  [91 %-95 %] 95 %    Physical Exam  Vitals signs and nursing note reviewed.   Constitutional:       General: He is awake.      Appearance: Normal appearance. He is well-developed. He is obese. He is not ill-appearing.   HENT:      Head: Normocephalic and atraumatic.      Mouth/Throat:      Lips: Pink.      Mouth: Mucous membranes are moist.   Eyes:      Conjunctiva/sclera: Conjunctivae normal.      Pupils: Pupils are equal, round, and reactive to light.   Neck:      Musculoskeletal: Normal range of motion and neck supple.   Cardiovascular:      Rate and Rhythm: Normal rate and regular rhythm.      Pulses: Normal pulses.      Heart sounds: Normal heart sounds.   Pulmonary:      Effort: Pulmonary effort is normal.      Breath sounds: Normal breath sounds.   Abdominal:      General: Abdomen is protuberant. Bowel sounds are normal. There is no distension or abdominal bruit.      Palpations: Abdomen is soft.      Tenderness: There is no abdominal tenderness.   Musculoskeletal:      Right lower leg: No edema.      Left lower leg: No edema.   Feet:      Right foot:      Skin integrity: Skin breakdown (medial side of 4th toe) present.      Comments: Toes # 4 & #5 on the right foot are less cyanotic today and cap refill is improved.   Ulcer present on the inner side of the 5th toe on the right foot, stable from yesterday.   Skin:     General: Skin is warm and dry.      Capillary Refill: Capillary refill takes 2 to 3 seconds.      Coloration: Skin is cyanotic (right toes #4 &  #5, mildly improved today).      Findings: Wound (Right 5th toe medial ulcer) present.   Neurological:      General: No focal deficit present.      Mental Status: He is alert and oriented to person, place, and time.      GCS: GCS eye subscore is 4. GCS verbal subscore is 5. GCS motor subscore is 6.   Psychiatric:         Attention and Perception: Attention and perception normal.         Mood and Affect: Mood and affect normal.         Speech: Speech normal.         Behavior: Behavior normal. Behavior is cooperative.         Thought Content: Thought content normal.         Cognition and Memory: Cognition and memory normal.         Judgment: Judgment normal.      Comments: Very pleasant.      Fluids    Intake/Output Summary (Last 24 hours) at 8/6/2020 1203  Last data filed at 8/6/2020 0900  Gross per 24 hour   Intake 360 ml   Output --   Net 360 ml     Laboratory  Recent Labs     08/04/20  0254   WBC 10.2   RBC 4.84   HEMOGLOBIN 14.9   HEMATOCRIT 44.4   MCV 91.7   MCH 30.8   MCHC 33.6*   RDW 43.6   PLATELETCT 312   MPV 10.5     Recent Labs     08/04/20  0254 08/05/20  0428   SODIUM 137 138   POTASSIUM 4.0 4.3   CHLORIDE 103 105   CO2 20 22   GLUCOSE 86 90   BUN 16 13   CREATININE 0.91 0.83   CALCIUM 8.7 8.9     Recent Labs     08/04/20  1318 08/04/20  1916 08/05/20  1930   APTT 63.5* 59.9* 59.4*     Imaging  EC-ECHOCARDIOGRAM COMPLETE W/ CONT   Final Result      US-AGUSTINA SINGLE LEVEL BILAT   Final Result      CT-FOOT W/O PLUS RECONS RIGHT   Final Result      1.  Soft tissue swelling and small skin ulcer in the plantar aspect of the right foot below the distal aspect of the proximal phalanx of the 2nd toe.      2.  No CT evidence of osteomyelitis.      3.  No subcutaneous abscess identified.      DX-FOOT-COMPLETE 3+ RIGHT   Final Result         1.  No acute traumatic bony injury.         Assessment/Plan  * Diabetic foot infection (HCC)- (present on admission)  Assessment & Plan  - Lab actually reported the wound culture  as MRSA - but informed that this was actually MSSA plus acinetobacter.   - Changed Dapto+Rocephin to Unasyn. Covers both.   - Ortho following.   - Cautiously optimistic.     Ischemia of toes- (present on admission)  Assessment & Plan  - Started on heparin infusion.  - Monitoring closely for the need of amputation.     Essential hypertension- (present on admission)  Assessment & Plan  Continue to monitor closely.     Type 2 diabetes mellitus with hyperglycemia, without long-term current use of insulin (HCC)- (present on admission)  Assessment & Plan  - Continue diabetic diet.   - Monitor FSBS.   - Sugars much better controlled now.      VTE prophylaxis: Heparin drip      Edilberto Perez MD

## 2020-08-06 NOTE — CARE PLAN
Problem: Safety  Goal: Will remain free from injury  Outcome: PROGRESSING AS EXPECTED  Note: Pt call light and belongings with in reach, bed in locked and low position, treaded socks on, bed rails up x2     Problem: Infection  Goal: Will remain free from infection  Outcome: PROGRESSING AS EXPECTED  Note: Pt shows no s/s of infection, Pt is afebrile. Standard precautions in place, hand washing performed before and after pt care.

## 2020-08-06 NOTE — OR NURSING
1440: Rcvd report from [facility], pt will be brought to pre-op by transport in time for the procedure.  1506: Pt arrived to pre-op holding via bed, brought by transport, pt tolerated transfer well. No pain or nausea, awake and alert, on room air.  1525: Patient allergies and NPO status verified, home medication reconciliation completed and belongings secured. Patient verbalizes understanding of pain scale, expected course of stay and plan of care. Surgical site verified with patient. IV access established. Sequentials placed on legs.

## 2020-08-06 NOTE — PROGRESS NOTES
Dr. Mallory called for new orders to be placed. Patient to go to surgery at 1600 tomorrow, 8/6. Orders placed by RN.

## 2020-08-07 LAB
APTT PPP: 29.1 SEC (ref 24.7–36)
GLUCOSE BLD-MCNC: 122 MG/DL (ref 65–99)
GLUCOSE BLD-MCNC: 128 MG/DL (ref 65–99)
GLUCOSE BLD-MCNC: 152 MG/DL (ref 65–99)
GLUCOSE BLD-MCNC: 76 MG/DL (ref 65–99)
INR PPP: 0.98 (ref 0.87–1.13)
PROTHROMBIN TIME: 13.1 SEC (ref 12–14.6)
UFH PPP CHRO-ACNC: <0.1 IU/ML

## 2020-08-07 PROCEDURE — 770020 HCHG ROOM/CARE - TELE (206)

## 2020-08-07 PROCEDURE — 85520 HEPARIN ASSAY: CPT

## 2020-08-07 PROCEDURE — 85610 PROTHROMBIN TIME: CPT

## 2020-08-07 PROCEDURE — 700102 HCHG RX REV CODE 250 W/ 637 OVERRIDE(OP): Performed by: HOSPITALIST

## 2020-08-07 PROCEDURE — A9270 NON-COVERED ITEM OR SERVICE: HCPCS | Performed by: HOSPITALIST

## 2020-08-07 PROCEDURE — 99232 SBSQ HOSP IP/OBS MODERATE 35: CPT | Performed by: INTERNAL MEDICINE

## 2020-08-07 PROCEDURE — A9270 NON-COVERED ITEM OR SERVICE: HCPCS | Performed by: INTERNAL MEDICINE

## 2020-08-07 PROCEDURE — 82962 GLUCOSE BLOOD TEST: CPT | Mod: 91

## 2020-08-07 PROCEDURE — 700102 HCHG RX REV CODE 250 W/ 637 OVERRIDE(OP): Performed by: INTERNAL MEDICINE

## 2020-08-07 PROCEDURE — 700105 HCHG RX REV CODE 258: Performed by: INTERNAL MEDICINE

## 2020-08-07 PROCEDURE — A9270 NON-COVERED ITEM OR SERVICE: HCPCS | Performed by: NURSE PRACTITIONER

## 2020-08-07 PROCEDURE — 700111 HCHG RX REV CODE 636 W/ 250 OVERRIDE (IP): Performed by: INTERNAL MEDICINE

## 2020-08-07 PROCEDURE — 700102 HCHG RX REV CODE 250 W/ 637 OVERRIDE(OP): Performed by: NURSE PRACTITIONER

## 2020-08-07 PROCEDURE — 85730 THROMBOPLASTIN TIME PARTIAL: CPT

## 2020-08-07 RX ORDER — HEPARIN SODIUM 1000 [USP'U]/ML
80 INJECTION, SOLUTION INTRAVENOUS; SUBCUTANEOUS ONCE
Status: COMPLETED | OUTPATIENT
Start: 2020-08-07 | End: 2020-08-07

## 2020-08-07 RX ORDER — HEPARIN SODIUM 5000 [USP'U]/100ML
0-25 INJECTION, SOLUTION INTRAVENOUS CONTINUOUS
Status: DISCONTINUED | OUTPATIENT
Start: 2020-08-07 | End: 2020-08-09

## 2020-08-07 RX ORDER — HEPARIN SODIUM 1000 [USP'U]/ML
40 INJECTION, SOLUTION INTRAVENOUS; SUBCUTANEOUS PRN
Status: DISCONTINUED | OUTPATIENT
Start: 2020-08-07 | End: 2020-08-09

## 2020-08-07 RX ORDER — AMOXICILLIN AND CLAVULANATE POTASSIUM 875; 125 MG/1; MG/1
1 TABLET, FILM COATED ORAL EVERY 12 HOURS
Status: DISCONTINUED | OUTPATIENT
Start: 2020-08-07 | End: 2020-08-10 | Stop reason: HOSPADM

## 2020-08-07 RX ADMIN — ACETAMINOPHEN 650 MG: 325 TABLET, FILM COATED ORAL at 08:55

## 2020-08-07 RX ADMIN — ACETAMINOPHEN 650 MG: 325 TABLET, FILM COATED ORAL at 19:24

## 2020-08-07 RX ADMIN — AMOXICILLIN AND CLAVULANATE POTASSIUM 1 TABLET: 875; 125 TABLET, FILM COATED ORAL at 17:06

## 2020-08-07 RX ADMIN — AMPICILLIN SODIUM AND SULBACTAM SODIUM 3 G: 2; 1 INJECTION, POWDER, FOR SOLUTION INTRAMUSCULAR; INTRAVENOUS at 05:29

## 2020-08-07 RX ADMIN — INSULIN GLARGINE 22 UNITS: 100 INJECTION, SOLUTION SUBCUTANEOUS at 17:04

## 2020-08-07 RX ADMIN — HEPARIN SODIUM 6400 UNITS: 1000 INJECTION, SOLUTION INTRAVENOUS; SUBCUTANEOUS at 17:07

## 2020-08-07 RX ADMIN — LISINOPRIL 5 MG: 5 TABLET ORAL at 05:29

## 2020-08-07 RX ADMIN — HEPARIN SODIUM 18 UNITS/KG/HR: 5000 INJECTION, SOLUTION INTRAVENOUS at 22:13

## 2020-08-07 RX ADMIN — HEPARIN SODIUM 18 UNITS/KG/HR: 5000 INJECTION, SOLUTION INTRAVENOUS at 17:11

## 2020-08-07 RX ADMIN — INSULIN LISPRO 1 UNITS: 100 INJECTION, SOLUTION INTRAVENOUS; SUBCUTANEOUS at 11:57

## 2020-08-07 RX ADMIN — AMPICILLIN SODIUM AND SULBACTAM SODIUM 3 G: 2; 1 INJECTION, POWDER, FOR SOLUTION INTRAMUSCULAR; INTRAVENOUS at 12:04

## 2020-08-07 ASSESSMENT — ENCOUNTER SYMPTOMS
PND: 0
FOCAL WEAKNESS: 0
HEADACHES: 0
SPEECH CHANGE: 0
NERVOUS/ANXIOUS: 0
COUGH: 0
CHILLS: 0
WEAKNESS: 0
VOMITING: 0
SENSORY CHANGE: 0
FEVER: 0
BLOOD IN STOOL: 0
ABDOMINAL PAIN: 0
WHEEZING: 0
PALPITATIONS: 0
SHORTNESS OF BREATH: 0
NAUSEA: 0
CONSTIPATION: 0
DEPRESSION: 0
INSOMNIA: 0
TINGLING: 0
DIZZINESS: 0
ORTHOPNEA: 0

## 2020-08-07 ASSESSMENT — FIBROSIS 4 INDEX
FIB4 SCORE: 0.77
FIB4 SCORE: 0.77

## 2020-08-07 NOTE — ANESTHESIA TIME REPORT
Anesthesia Start and Stop Event Times     Date Time Event    8/6/2020 1659 Ready for Procedure     1704 Anesthesia Start     1759 Anesthesia Stop        Responsible Staff  08/06/20    Name Role Begin End    Amber Camacho M.D. Anesth 1704 1759        Preop Diagnosis (Free Text):  Pre-op Diagnosis     NECROTIC 5TH RIGHT TOE         Preop Diagnosis (Codes):    Post op Diagnosis  Diabetic infection of right foot (HCC)      Premium Reason  K. Alert    Comments:

## 2020-08-07 NOTE — PROGRESS NOTES
Bedside report received from Demetra PIERRE. Assumed care. POC discussed. Patient back from PACU, resting comfortably in bed. Safety precautions in place.

## 2020-08-07 NOTE — ANESTHESIA POSTPROCEDURE EVALUATION
Patient: Mc Eugene    Procedure Summary     Date: 08/06/20 Room / Location:  OR 03 / SURGERY [facility]    Anesthesia Start: 1704 Anesthesia Stop: 1759    Procedure: AMPUTATION, TOE - 5th TOE (Right Fifth Toe) Diagnosis: (NECROTIC 5TH RIGHT TOE )    Surgeon: Liban Mallory M.D. Responsible Provider: Amber Camacho M.D.    Anesthesia Type: general ASA Status: 3 - Emergent          Final Anesthesia Type: general  Last vitals  BP   Blood Pressure: 146/90    Temp   36.7 °C (98.1 °F)    Pulse   Pulse: 96   Resp   18    SpO2   95 %      Anesthesia Post Evaluation    Patient location during evaluation: PACU  Patient participation: complete - patient participated  Level of consciousness: awake and alert    Airway patency: patent  Anesthetic complications: no  Cardiovascular status: hemodynamically stable  Respiratory status: acceptable  Hydration status: euvolemic    PONV: none           Nurse Pain Score: 1 (NPRS)

## 2020-08-07 NOTE — PROGRESS NOTES
"Assessment completed, patient A&Ox4, reports, \"mild\" pain to foot, tolerable, declines any intervention. , no insulin needed. No other needs at this time.   "

## 2020-08-07 NOTE — ANESTHESIA QCDR
2019 [facility] Clinical Data Registry (for Quality Improvement)     Postoperative nausea/vomiting risk protocol (Adult = 18 yrs and Pediatric 3-17 yrs)- (430 and 463)  General inhalation anesthetic (NOT TIVA) with PONV risk factors: No  Provision of anti-emetic therapy with at least 2 different classes of agents: N/A  Patient DID NOT receive anti-emetic therapy and reason is documented in Medical Record: N/A    Multimodal Pain Management- (477)  Non-emergent surgery AND patient age >= 18: No  Use of Multimodal Pain Management, two or more drugs and/or interventions, NOT including systemic opioids:   Exception: Documented allergy to multiple classes of analgesics:     Smoking Abstinence (404)  Patient is current smoker (cigarette, pipe, e-cig, marijuanna): Yes  Elective Surgery: No  Abstinence instructions provided prior to day of surgery:   Patient abstained from smoking on day of surgery:     Pre-Op Beta-Blocker in Isolated CABG (44)  Isolated CABG AND patient age >= 18: No  Beta-blocker admin within 24 hours of surgical incision:   Exception:of medical reason(s) for not administering beta blocker within 24 hours prior to surgical incision (e.g., not  indicated,other medical reason):     PACU assessment of acute postoperative pain prior to Anesthesia Care End- Applies to Patients Age = 18- (ABG7)  Initial PACU pain score is which of the following: < 7/10  Patient unable to report pain score: N/A    Post-anesthetic transfer of care checklist/protocol to PACU/ICU- (426 and 427)  Upon conclusion of case, patient transferred to which of the following locations: PACU/Non-ICU  Use of transfer checklist/protocol: Yes  Exclusion: Service Performed in Patient Hospital Room (and thus did not require transfer): N/A  Unplanned admission to ICU related to anesthesia service up through end of PACU care- (MD51)  Unplanned admission to ICU (not initially anticipated at anesthesia start time): No

## 2020-08-07 NOTE — OP REPORT
DATE OF SERVICE:  08/06/2020    PREOPERATIVE DIAGNOSIS:  Right fifth toe ischemia with diabetic ulcer.    POSTOPERATIVE DIAGNOSIS:  Right fifth toe ischemia with diabetic ulcer.    SURGERY PERFORMED:  Right fifth toe amputation.    SURGEON:  Liban Mallory MD    ANESTHESIA:  General.    COMPLICATIONS:  None.    ESTIMATED BLOOD LOSS:  1 mL.    INDICATIONS FOR PROCEDURE:  The patient is a very pleasant gentleman who   presented to [facility] roughly 5 days ago with increasing pain,   edema and erythema over the right fourth and fifth toes.  He was found to have   ischemia in the fourth and fifth toes as well as an ulcer on the medial   aspect of the fifth toe.  He was found to have ischemia of the fourth and   fifth toes and was started on a heparin drip.  The fifth toe became necrotic   with dry gangrene and the fourth toe appears to be improving.  Due to the dry   gangrene, the decision was made to take him to the operating room today for   the above-mentioned procedure.    DESCRIPTION OF PROCEDURE:  On the day of surgery, the patient was seen in the   preoperative area where informed consent was obtained with all risks and   benefits of the procedure explained and all questions answered.  He wished to   proceed with the surgery.  Proper site was marked.  He was subsequently taken   to the operating room and placed in the supine position with all bony   prominences well padded.  General endotracheal anesthesia was induced.  The   right lower extremity was then prepped and draped in the usual sterile   fashion.    A timeout was performed with all persons in attendance agreeing on the proper   patient, proper surgical site and proper surgery to be performed.    An incision was made over about the fifth toe at the MTP joint.  There was   viable skin on the proximal dorsal aspect of the fifth toe, which was   incorporated into the proximal skin flap for wound closure.  I then sharply   dissected around  the proximal phalanx down to the MTP joint.  I isolated the   MTP joint and the collateral ligaments in the flexor and extensor tendons were   then incised using a 15 blade.  I was able to disarticulate the toe and   complete the amputation at the MTP joint.  The toe was then passed off the   back table.  The wound was thoroughly irrigated with copious amounts of normal   saline.  Although there was no evidence of infection deep, I was concerned   about the lack of bleeding from the wound, which is concerning for a healing   potential.  The wound was thoroughly irrigated with copious amounts of normal   saline and deep soft tissues were repaired using 3-0 Monocryl.  The skin was   then repaired using 4-0 nylon in a horizontal mattress fashion.  The wound was   then dressed with Xeroform, 4x4's, sterile cast padding and a loosely   approximated Coban dressing.  General endotracheal anesthesia was reversed and   he was taken to the PACU in good and stable condition.    DISPOSITION:  He will be returned to the hospital floor under the hospitalist   service.  He will continue with IV antibiotics per the hospitalist service for   a minimum of at least 24 hours postoperatively.  We will continue to monitor   the fourth toe to determine viability of this toe, but at this time, I am   optimistic about its potential for healing.  I will order a hard-soled shoe   and he may be weightbearing as tolerated to the right lower extremity with a   hard-soled shoe in place.  He may continue with wound care with dressing   changes beginning postoperative day #2.       ____________________________________     MD DAKOTAH Lopez / JARRED    DD:  08/06/2020 18:22:07  DT:  08/06/2020 18:54:58    D#:  [id number]  Job#:  [id number]

## 2020-08-07 NOTE — ANESTHESIA PROCEDURE NOTES
Airway    Date/Time: 8/6/2020 5:10 PM  Performed by: Amber Camacho M.D.  Authorized by: Amber Camacho M.D.     Location:  OR  Urgency:  Elective  Indications for Airway Management:  Anesthesia      Spontaneous Ventilation: absent    Sedation Level:  Deep  Preoxygenated: Yes    Mask Difficulty Assessment:  0 - not attempted  Final Airway Type:  Supraglottic airway  Final Supraglottic Airway:  Standard LMA    SGA Size:  5  Number of Attempts at Approach:  1

## 2020-08-07 NOTE — CARE PLAN
Problem: Safety  Goal: Will remain free from injury  Outcome: PROGRESSING AS EXPECTED  Note: Pt call light and belongings with in reach, bed in locked and low position, treaded socks on, bed rails up x2       Problem: Pain Management  Goal: Pain level will decrease to patient's comfort goal  Outcome: PROGRESSING AS EXPECTED  Note: Patients pain managed with PRN tylenol.

## 2020-08-07 NOTE — PROGRESS NOTES
Received call from PACU and received report. Weight bearing as tolerated with hard sole shoe. Resume pre-op diet. Pt's pressures have been stable. Waiting on pts arrival.

## 2020-08-07 NOTE — PROGRESS NOTES
Received report from GABBY Woodard. POC discussed. Patient is awake with no additional needs at this time. Safety precautions are in place.

## 2020-08-07 NOTE — OR NURSING
1754 Pt arrived from OR, s/p right 5th toe amputation.  Report received from anesthesia and OR RN. Simple mask to 6L O2 in place, breath sounds clear bilaterally. Surgical site to right toe clean, dry and intact with surgical dressing in place.  Pt arousing to voice at this time, denies pain or nausea; appears comfortable with unlabored breathing.   1810 Pt resting comfortably, denies pain or nausea. Blood glucose checked per pt request, 85.   1825 Wife called and given update. Pt continues to deny pain or nausea.   1840 Pt states pain is controlled and tolerable, denies nausea. Pt tolerating PO fluids. Meets transfer criteria. Awaiting call back from [employer] to give report.   1852 Report to GABBY Mccrary. . Pt transferred to [id number]

## 2020-08-07 NOTE — CARE PLAN
Problem: Pain Management  Goal: Pain level will decrease to patient's comfort goal  Outcome: PROGRESSING AS EXPECTED   Pharmacological and non-pharmacological interventions used to decrease pt pain to comfort goal    Problem: Safety  Goal: Will remain free from falls  Outcome: PROGRESSING AS EXPECTED   Bed locked/low. Pt demonstrates effective use of call light. Safety precautions are in place    Problem: Communication  Goal: The ability to communicate needs accurately and effectively will improve  Outcome: PROGRESSING AS EXPECTED   Pt shares questions and concerns with treatment team. Pt updated on POC.

## 2020-08-07 NOTE — PROGRESS NOTES
[facility] Medicine Daily Progress Note    Date of Service  8/7/2020    Chief Complaint  Right foot pain    Hospital Course   Mr. Eugene is a 54-year-old male with a past medical history of uncontrolled diabetes mellitus and congenital single kidney who presented to the emergency department on 7/31/2020 with complaints of increasing pain to his right foot including discoloration to his fourth and fifth toes after he sustained some form of injury to it a few weeks prior.  He was diagnosed with a diabetic foot infection and sepsis and subsequently started on IV fluids, IV antibiotics, and admitted to the hospital for further evaluation and treatment. ABIs were done and identified embolic ischemia of the toes. Echocardiogram was negative for a cardiac source and had normal systolic and diastolic function. He was started on a heparin drip and orthopedic surgery was consulted. They are hopeful that IV antibiotics and anticoagulation will salvage the toes and prevent the need for amputation.       Interval Problem Update  8/6/20: Underwent right fifth toe amputation. No acute issues.       Consultants/Specialty  Orthopedic surgery - Dr. Mallory    Code Status  Full code    Disposition  Anticipate discharge home later this week if stays clinically stable.     Review of Systems  Review of Systems   Constitutional: Negative for chills, fever and malaise/fatigue.   Respiratory: Negative for cough, shortness of breath and wheezing.    Cardiovascular: Negative for chest pain, palpitations, orthopnea, leg swelling and PND.   Gastrointestinal: Negative for abdominal pain, blood in stool, constipation, melena, nausea and vomiting. Diarrhea: subsiding now.   Genitourinary: Negative for dysuria, frequency and urgency.   Musculoskeletal:        4th toe pain with palpation. Wound oozing in OR.   Bilat foot pain, chronic, neuropathic.    Neurological: Negative for dizziness, tingling, sensory change, speech change, focal weakness,  weakness and headaches.        + neuropathic in bilat feet   Psychiatric/Behavioral: Negative for depression. The patient is not nervous/anxious and does not have insomnia.    All other systems reviewed and are negative.     Physical Exam  Temp:  [36.3 °C (97.4 °F)-36.8 °C (98.3 °F)] 36.8 °C (98.3 °F)  Pulse:  [77-99] 99  Resp:  [14-18] 18  BP: (103-159)/(70-91) 130/81  SpO2:  [91 %-99 %] 93 %    Right 4th toe appears better compared to yesterday.      Fluids    Intake/Output Summary (Last 24 hours) at 8/7/2020 1209  Last data filed at 8/7/2020 0930  Gross per 24 hour   Intake 1260 ml   Output --   Net 1260 ml     Laboratory      Recent Labs     08/05/20  0428   SODIUM 138   POTASSIUM 4.3   CHLORIDE 105   CO2 22   GLUCOSE 90   BUN 13   CREATININE 0.83   CALCIUM 8.9     Recent Labs     08/04/20  1916 08/05/20  1930 08/06/20  1942   APTT 59.9* 59.4* 30.3     Imaging  EC-ECHOCARDIOGRAM COMPLETE W/ CONT   Final Result      US-AGUSTINA SINGLE LEVEL BILAT   Final Result      CT-FOOT W/O PLUS RECONS RIGHT   Final Result      1.  Soft tissue swelling and small skin ulcer in the plantar aspect of the right foot below the distal aspect of the proximal phalanx of the 2nd toe.      2.  No CT evidence of osteomyelitis.      3.  No subcutaneous abscess identified.      DX-FOOT-COMPLETE 3+ RIGHT   Final Result         1.  No acute traumatic bony injury.         Assessment/Plan  * Diabetic foot infection (HCC)- (present on admission)  Assessment & Plan  - Would cultures positive for MSSA plus acinetobacter.   - Changed Dapto+Rocephin to Unasyn. Covers both. Will switch to oral augmentin today.   - Ortho following.   - Discussed case with Dr Mallory. Agreed with oral abx.   - Recommended continuing AC for a few more days.       Ischemia of toes- (present on admission)  Assessment & Plan  - Discussed with Dr Bagley and recommended restarting heparin infusion for a few more days.  - Did not feel the need for chronic AC.  - Recommended  ASA.      Essential hypertension- (present on admission)  Assessment & Plan  Continue to monitor closely.     Type 2 diabetes mellitus with hyperglycemia, without long-term current use of insulin (HCC)- (present on admission)  Assessment & Plan  - Continue diabetic diet.  - Monitor FSBS.        VTE prophylaxis: Heparin lizz Perez MD

## 2020-08-07 NOTE — CARE PLAN
Problem: Communication  Goal: The ability to communicate needs accurately and effectively will improve  Outcome: PROGRESSING AS EXPECTED  Updated pt whiteboard and discussed POC. Answered all questions. Pt verbalized understanding of teaching.       Problem: Safety  Goal: Will remain free from injury  Outcome: PROGRESSING AS EXPECTED  Educated pt to use the call light when getting out of bed. Educated pt about using the bed alarm at night to prevent falls. Pt verbalized an understanding of teaching.

## 2020-08-07 NOTE — PROGRESS NOTES
Telemetry Shift Summary    Rhythm SR  HR Range 79-92  Ectopy oPVC  Measurements 0.14/0.08/0.40        Normal Values  Rhythm SR  HR Range    Measurements 0.12-0.20 / 0.06-0.10  / 0.30-0.52

## 2020-08-07 NOTE — PROGRESS NOTES
Bedside report given to Mary PIERRE. POC discussed. Pt resting comfortably in bed. Safety precautions in place.

## 2020-08-07 NOTE — PROGRESS NOTES
Telemetry Shift Summary     Rhythm: SR-ST  HR Range: 's   Ectopy: Per Monitor Tech Katheryn:Rare to occasional PVC's and rare bigem         Measurements for strip printed 8/6/20 at 14:22:   HR 95    0.16/0.08/0.38           Normal Values  Rhythm SR  HR Range    Measurements 0.12-0.20 / 0.06-0.10  / 0.30-0.52

## 2020-08-07 NOTE — PROGRESS NOTES
IV ABX hung, patient reports 3/10 pain, PRN tylenol given as ordered. No other needs at this time.

## 2020-08-08 LAB
GLUCOSE BLD-MCNC: 105 MG/DL (ref 65–99)
GLUCOSE BLD-MCNC: 143 MG/DL (ref 65–99)
GLUCOSE BLD-MCNC: 83 MG/DL (ref 65–99)
GLUCOSE BLD-MCNC: 83 MG/DL (ref 65–99)
UFH PPP CHRO-ACNC: 0.36 IU/ML
UFH PPP CHRO-ACNC: 0.47 IU/ML
UFH PPP CHRO-ACNC: <0.1 U/ML

## 2020-08-08 PROCEDURE — 82962 GLUCOSE BLOOD TEST: CPT | Mod: 91

## 2020-08-08 PROCEDURE — A9270 NON-COVERED ITEM OR SERVICE: HCPCS | Performed by: INTERNAL MEDICINE

## 2020-08-08 PROCEDURE — 85520 HEPARIN ASSAY: CPT | Mod: 91

## 2020-08-08 PROCEDURE — 700102 HCHG RX REV CODE 250 W/ 637 OVERRIDE(OP): Performed by: INTERNAL MEDICINE

## 2020-08-08 PROCEDURE — 700111 HCHG RX REV CODE 636 W/ 250 OVERRIDE (IP): Performed by: INTERNAL MEDICINE

## 2020-08-08 PROCEDURE — 99232 SBSQ HOSP IP/OBS MODERATE 35: CPT | Performed by: INTERNAL MEDICINE

## 2020-08-08 PROCEDURE — 770020 HCHG ROOM/CARE - TELE (206)

## 2020-08-08 PROCEDURE — A9270 NON-COVERED ITEM OR SERVICE: HCPCS | Performed by: NURSE PRACTITIONER

## 2020-08-08 PROCEDURE — 700102 HCHG RX REV CODE 250 W/ 637 OVERRIDE(OP): Performed by: NURSE PRACTITIONER

## 2020-08-08 RX ADMIN — HEPARIN SODIUM 22 UNITS/KG/HR: 5000 INJECTION, SOLUTION INTRAVENOUS at 13:17

## 2020-08-08 RX ADMIN — INSULIN GLARGINE 22 UNITS: 100 INJECTION, SOLUTION SUBCUTANEOUS at 17:56

## 2020-08-08 RX ADMIN — HEPARIN SODIUM 22 UNITS/KG/HR: 5000 INJECTION, SOLUTION INTRAVENOUS at 02:23

## 2020-08-08 RX ADMIN — AMOXICILLIN AND CLAVULANATE POTASSIUM 1 TABLET: 875; 125 TABLET, FILM COATED ORAL at 17:12

## 2020-08-08 RX ADMIN — AMOXICILLIN AND CLAVULANATE POTASSIUM 1 TABLET: 875; 125 TABLET, FILM COATED ORAL at 05:57

## 2020-08-08 RX ADMIN — LISINOPRIL 5 MG: 5 TABLET ORAL at 05:57

## 2020-08-08 ASSESSMENT — ENCOUNTER SYMPTOMS
ABDOMINAL PAIN: 0
NERVOUS/ANXIOUS: 0
WHEEZING: 0
VOMITING: 0
FEVER: 0
SENSORY CHANGE: 0
ORTHOPNEA: 0
SPEECH CHANGE: 0
COUGH: 0
CHILLS: 0
NAUSEA: 0
DIZZINESS: 0
INSOMNIA: 0
DEPRESSION: 0
CONSTIPATION: 0
SHORTNESS OF BREATH: 0
FOCAL WEAKNESS: 0
PND: 0
HEADACHES: 0
TINGLING: 0
PALPITATIONS: 0
BLOOD IN STOOL: 0
WEAKNESS: 0

## 2020-08-08 ASSESSMENT — PATIENT HEALTH QUESTIONNAIRE - PHQ9
1. LITTLE INTEREST OR PLEASURE IN DOING THINGS: NOT AT ALL
SUM OF ALL RESPONSES TO PHQ9 QUESTIONS 1 AND 2: 0
2. FEELING DOWN, DEPRESSED, IRRITABLE, OR HOPELESS: NOT AT ALL

## 2020-08-08 NOTE — PROGRESS NOTES
Bedside report given to Jose Luis PIERRE. POC discussed. Pt resting comfortably in bed. Safety precautions in place.

## 2020-08-08 NOTE — PROGRESS NOTES
Assessment completed, patient A&Ox4, patient reports 3/10 right foot pain, PRN tylenol given as ordered. Patient left to rest, no other needs at this time.

## 2020-08-08 NOTE — PROGRESS NOTES
Telemetry Shift Summary    Rhythm SR/ST  HR Range   Ectopy oPVC  Measurements 0.16/0.08/0.40        Normal Values  Rhythm SR  HR Range    Measurements 0.12-0.20 / 0.06-0.10  / 0.30-0.52

## 2020-08-08 NOTE — PROGRESS NOTES
Bedside report received from Ya PIERRE. Assumed care. POC discussed. Pt resting comfortably in bed. Safety precautions in place.

## 2020-08-08 NOTE — PROGRESS NOTES
"Called lab regarding patient unfractionated heparin lab for heprain gtt, per lab, \"forgot to receive it.\" Results to be shown shortly  "

## 2020-08-08 NOTE — CARE PLAN
Problem: Communication  Goal: The ability to communicate needs accurately and effectively will improve  Outcome: PROGRESSING AS EXPECTED  Note: Pt verbalizes understanding of plan of care. Is very involved and has no concerns at this time.      Problem: Safety  Goal: Will remain free from injury  Outcome: PROGRESSING AS EXPECTED  Note: Pt verbalizes understanding of safety measures in place. Call light and personal belongings within reach.      Problem: Knowledge Deficit  Goal: Knowledge of disease process/condition, treatment plan, diagnostic tests, and medications will improve  Outcome: PROGRESSING AS EXPECTED  Note: Pt verbalizes understanding of education provided.

## 2020-08-08 NOTE — PROGRESS NOTES
[facility] Medicine Daily Progress Note    Date of Service  8/8/2020    Chief Complaint  Right foot pain    Hospital Course   Mr. Eugene is a 54-year-old male with a past medical history of uncontrolled diabetes mellitus and congenital single kidney who presented to the emergency department on 7/31/2020 with complaints of increasing pain to his right foot including discoloration to his fourth and fifth toes after he sustained some form of injury to it a few weeks prior.  He was diagnosed with a diabetic foot infection and sepsis and subsequently started on IV fluids, IV antibiotics, and admitted to the hospital for further evaluation and treatment. ABIs were done and identified embolic ischemia of the toes. Echocardiogram was negative for a cardiac source and had normal systolic and diastolic function. He was started on a heparin drip and orthopedic surgery was consulted. Patient underwent 5th toe amputation. Discussed with Dr Mallory and he recommended that further anticoagulation will salvage the 4th toe and prevent the need for 4th toe amputation.       Interval Problem Update  8/6/20: Underwent right fifth toe amputation. No acute issues.       Consultants/Specialty  Orthopedic surgery - Dr. Mallory    Code Status  Full code    Disposition  Anticipate discharge home later this week if stays clinically stable.     Review of Systems  Review of Systems   Constitutional: Negative for chills, fever and malaise/fatigue.   Respiratory: Negative for cough, shortness of breath and wheezing.    Cardiovascular: Negative for chest pain, palpitations, orthopnea, leg swelling and PND.   Gastrointestinal: Negative for abdominal pain, blood in stool, constipation, melena, nausea and vomiting. Diarrhea: subsiding now.   Genitourinary: Negative for dysuria, frequency and urgency.   Musculoskeletal:        4th toe pain improved. Wound oozing in OR.   Bilat foot pain, chronic, neuropathic.    Neurological: Negative for dizziness,  tingling, sensory change, speech change, focal weakness, weakness and headaches.        + neuropathic in bilat feet   Psychiatric/Behavioral: Negative for depression. The patient is not nervous/anxious and does not have insomnia.    All other systems reviewed and are negative.     Physical Exam  Temp:  [36.6 °C (97.9 °F)-36.9 °C (98.5 °F)] 36.7 °C (98 °F)  Pulse:  [81-99] 90  Resp:  [16-18] 18  BP: (130-164)/(80-96) 164/90  SpO2:  [90 %-95 %] 93 %    Right 4th toe appears better compared to yesterday.      Fluids    Intake/Output Summary (Last 24 hours) at 8/8/2020 0766  Last data filed at 8/7/2020 0930  Gross per 24 hour   Intake 360 ml   Output --   Net 360 ml     Laboratory          Recent Labs     08/05/20  1930 08/06/20  1942 08/07/20  1412   APTT 59.4* 30.3 29.1   INR  --   --  0.98     Imaging  EC-ECHOCARDIOGRAM COMPLETE W/ CONT   Final Result      US-AGUSTINA SINGLE LEVEL BILAT   Final Result      CT-FOOT W/O PLUS RECONS RIGHT   Final Result      1.  Soft tissue swelling and small skin ulcer in the plantar aspect of the right foot below the distal aspect of the proximal phalanx of the 2nd toe.      2.  No CT evidence of osteomyelitis.      3.  No subcutaneous abscess identified.      DX-FOOT-COMPLETE 3+ RIGHT   Final Result         1.  No acute traumatic bony injury.         Assessment/Plan  * Diabetic foot infection (HCC)- (present on admission)  Assessment & Plan  - Wound cultures positive for MSSA plus acinetobacter.   - Changed Dapto+Rocephin to Unasyn. Covered both. Switched to oral augmentin.   - Ortho following.   - Discussed case with Dr Mallory. Agreed with oral abx.   - Recommended continuing AC for a few more days.       Ischemia of toes- (present on admission)  Assessment & Plan  - Discussed with Dr Bagley and recommended restarting heparin infusion for a few more days.  - Did not feel the need for chronic AC.  - Recommended ASA.      Essential hypertension- (present on admission)  Assessment &  Plan  Continue to monitor closely.     Type 2 diabetes mellitus with hyperglycemia, without long-term current use of insulin (HCC)- (present on admission)  Assessment & Plan  - Continue diabetic diet.  - Monitor FSBS.        VTE prophylaxis: Heparin lizz Perez MD

## 2020-08-08 NOTE — PROGRESS NOTES
Per lab, heparin xa sample sent to regional for processing there. Called regional regarding heparin xa lab, reporting she does not see it on her end. New order placed for repeat heparin xa lab. Lab notified. Called pharmacy to verify if the gtt should be paused or to continue running. Per pharmacy continue to run heparin gtt.

## 2020-08-08 NOTE — PROGRESS NOTES
Completed dressing change on right foot. Notified physician on call at [facility] about his 4th toe looking purplish/gray in comparison to the rest of his toes. 2+ pulse palpated. The rest of the pts foot and toes look pink, warm, <3 sec cap refill. Per Dr. Parikh, Dr. Mallory will come take a look at it.

## 2020-08-09 LAB
GLUCOSE BLD-MCNC: 101 MG/DL (ref 65–99)
GLUCOSE BLD-MCNC: 137 MG/DL (ref 65–99)
GLUCOSE BLD-MCNC: 76 MG/DL (ref 65–99)
GLUCOSE BLD-MCNC: 97 MG/DL (ref 65–99)
UFH PPP CHRO-ACNC: 0.4 IU/ML

## 2020-08-09 PROCEDURE — 770020 HCHG ROOM/CARE - TELE (206)

## 2020-08-09 PROCEDURE — 700102 HCHG RX REV CODE 250 W/ 637 OVERRIDE(OP): Performed by: INTERNAL MEDICINE

## 2020-08-09 PROCEDURE — A9270 NON-COVERED ITEM OR SERVICE: HCPCS | Performed by: INTERNAL MEDICINE

## 2020-08-09 PROCEDURE — 700102 HCHG RX REV CODE 250 W/ 637 OVERRIDE(OP): Performed by: HOSPITALIST

## 2020-08-09 PROCEDURE — 82962 GLUCOSE BLOOD TEST: CPT

## 2020-08-09 PROCEDURE — A9270 NON-COVERED ITEM OR SERVICE: HCPCS | Performed by: HOSPITALIST

## 2020-08-09 PROCEDURE — 85520 HEPARIN ASSAY: CPT

## 2020-08-09 PROCEDURE — 700111 HCHG RX REV CODE 636 W/ 250 OVERRIDE (IP): Performed by: INTERNAL MEDICINE

## 2020-08-09 PROCEDURE — 99232 SBSQ HOSP IP/OBS MODERATE 35: CPT | Performed by: HOSPITALIST

## 2020-08-09 PROCEDURE — A9270 NON-COVERED ITEM OR SERVICE: HCPCS | Performed by: NURSE PRACTITIONER

## 2020-08-09 PROCEDURE — 700102 HCHG RX REV CODE 250 W/ 637 OVERRIDE(OP): Performed by: NURSE PRACTITIONER

## 2020-08-09 RX ADMIN — ASPIRIN 81 MG: 81 TABLET, COATED ORAL at 17:18

## 2020-08-09 RX ADMIN — LISINOPRIL 5 MG: 5 TABLET ORAL at 06:06

## 2020-08-09 RX ADMIN — HEPARIN SODIUM 22 UNITS/KG/HR: 5000 INJECTION, SOLUTION INTRAVENOUS at 02:16

## 2020-08-09 RX ADMIN — INSULIN GLARGINE 22 UNITS: 100 INJECTION, SOLUTION SUBCUTANEOUS at 17:18

## 2020-08-09 RX ADMIN — AMOXICILLIN AND CLAVULANATE POTASSIUM 1 TABLET: 875; 125 TABLET, FILM COATED ORAL at 06:06

## 2020-08-09 RX ADMIN — AMOXICILLIN AND CLAVULANATE POTASSIUM 1 TABLET: 875; 125 TABLET, FILM COATED ORAL at 17:18

## 2020-08-09 ASSESSMENT — ENCOUNTER SYMPTOMS
CONSTITUTIONAL NEGATIVE: 1
CHILLS: 0
PSYCHIATRIC NEGATIVE: 1
NEUROLOGICAL NEGATIVE: 1
EYES NEGATIVE: 1
GASTROINTESTINAL NEGATIVE: 1
CARDIOVASCULAR NEGATIVE: 1
FEVER: 0
RESPIRATORY NEGATIVE: 1

## 2020-08-09 NOTE — DISCHARGE PLANNING
Anticipated Discharge Disposition: Home    Action: SW completed chart review. Patient had 5th toe amputation, getting anticoagulation for a few days per note. Expected d/c date updated.     Barriers to Discharge: No anticipated barriers at this time.     Plan: Care coordination to continue to follow for d/c needs. Inpatient/Managed Care: No IMM at d/c.

## 2020-08-09 NOTE — CARE PLAN
Problem: Safety  Goal: Will remain free from falls  Outcome: PROGRESSING AS EXPECTED  Note: Pt has boot on left foot, pt able to ambulate safely by self, fall precautions in place.     Problem: Infection  Goal: Will remain free from infection  Outcome: PROGRESSING AS EXPECTED  Note: Educated pt on importance oh hand hygiene as well as wound hygiene, dressing changes performed as ordered.

## 2020-08-09 NOTE — PROGRESS NOTES
NAC overnight, no new c/o, pain continues to improve.  He was transitioned to PO abx 2 days ago.  Continues on heparin drip to improve blood flow to the fourth toe.    AF/VSS  Wound well healing  Fourth toe continues to improve with respect to color and capillary refill  Continues to have some TTP about the fourth toe  Remaining toes without signs of infection  Area of callus about the second toe metatarsal head was trimmed during surgery and appears to be doing well without skin breakdown    A: Right fourth and fifth toe ischemia and diabetic ulcer now s/p fifth toe amputation  P: Cont with daily dressing changes  WBAT with a hard sole shoe  Transition to ASA 81 mg PO BID  Plan for d/c to home tomorrow if fourth toe continues to improve  F/U 2 weeks post op  Please call with any questions

## 2020-08-09 NOTE — PROGRESS NOTES
[facility] Medicine Daily Progress Note    Date of Service  8/9/2020    Chief Complaint  54 y.o. male admitted 7/31/2020 with right foot pain    Hospital Course    Mr. uEgene is a 54-year-old male with a PMHx of uncontrolled diabetes mellitus and congenital single kidney who presented to the emergency department on 7/31/2020 with complaints of increasing pain to his right foot including discoloration to his fourth and fifth toes after he sustained some form of injury to it a few weeks prior.  He was diagnosed with a diabetic foot infection and sepsis and subsequently started on IV fluids, IV antibiotics, and admitted to the hospital for further evaluation and treatment. ABIs were done and identified embolic ischemia of the toes. Echocardiogram was negative for a cardiac source and had normal systolic and diastolic function. He was started on a heparin drip and orthopedic surgery was consulted. Patient underwent 5th toe amputation on 8/6/2020 with orthopedic surgery. Discussed with Dr Mallory and he recommended that further anticoagulation will salvage the 4th toe and prevent the need for 4th toe amputation.    Patient admitted for further evaluation and treatment.             Interval Problem Update  -Patient seen and examined.  Right fifth toe amputated noted.  Right fourth toe discoloration noted.  Patient reports mild discomfort on right foot.  Patient aware and POC.  -POC: Continue Heparin gtt and ABX therapy; monitor blood sugar; encourage increase activity; monitor for safety; monitor for any signs of bleeding.   -Lab work: Reviewed; unremarkable  -VSS at this time  -Ordered: CBC and BMP for a.m.    Consultants/Specialty  Orthopedic surgery -Dr. Gutierrez -following    Code Status  Full Code    Disposition  TBD    Review of Systems  Review of Systems   Constitutional: Negative.  Negative for chills and fever.   HENT: Negative.    Eyes: Negative.    Respiratory: Negative.    Cardiovascular: Negative.     Gastrointestinal: Negative.    Genitourinary: Negative.    Musculoskeletal: Positive for joint pain.   Skin: Negative.    Neurological: Negative.    Endo/Heme/Allergies: Negative.    Psychiatric/Behavioral: Negative.         Physical Exam  Temp:  [36.6 °C (97.8 °F)-36.9 °C (98.4 °F)] 36.6 °C (97.9 °F)  Pulse:  [77-93] 84  Resp:  [16-22] 22  BP: (117-159)/(74-86) 117/82  SpO2:  [90 %-94 %] 94 %    Physical Exam  Vitals signs and nursing note reviewed.   HENT:      Head: Normocephalic.      Nose: Nose normal.      Mouth/Throat:      Mouth: Mucous membranes are moist.   Eyes:      Pupils: Pupils are equal, round, and reactive to light.   Neck:      Musculoskeletal: Normal range of motion.   Cardiovascular:      Rate and Rhythm: Normal rate and regular rhythm.      Pulses: Normal pulses.      Heart sounds: Normal heart sounds.   Pulmonary:      Effort: Pulmonary effort is normal.   Abdominal:      General: Bowel sounds are normal.   Musculoskeletal: Normal range of motion.         General: Tenderness, deformity and signs of injury present.      Right lower leg: Edema present.      Comments: RIGHT 5th toe post amputation  RIGHT 4th toe discoloration   Skin:     General: Skin is warm.      Capillary Refill: Capillary refill takes 2 to 3 seconds.   Neurological:      Mental Status: He is alert. Mental status is at baseline.         Fluids    Intake/Output Summary (Last 24 hours) at 8/9/2020 1422  Last data filed at 8/9/2020 0400  Gross per 24 hour   Intake 860 ml   Output --   Net 860 ml       Laboratory          Recent Labs     08/06/20  1942 08/07/20  1412   APTT 30.3 29.1   INR  --  0.98               Imaging  EC-ECHOCARDIOGRAM COMPLETE W/ CONT   Final Result      US-AGUSTINA SINGLE LEVEL BILAT   Final Result      CT-FOOT W/O PLUS RECONS RIGHT   Final Result      1.  Soft tissue swelling and small skin ulcer in the plantar aspect of the right foot below the distal aspect of the proximal phalanx of the 2nd toe.      2.  No  CT evidence of osteomyelitis.      3.  No subcutaneous abscess identified.      DX-FOOT-COMPLETE 3+ RIGHT   Final Result         1.  No acute traumatic bony injury.           Assessment/Plan  * Diabetic foot infection (HCC)- (present on admission)  Assessment & Plan  -Was on Dapto post RIGHT 5th toe amputation with Dr. Gross - on Augmentin now  -Orthopedic surgery following.   -Wound care consulted.     Ischemia of toes- (present on admission)  Assessment & Plan  -Embolic per AGUSTINA report. Echo showed no cardiac source of emboli.   -Continue heparin drip. Monitor clinical progression.   -Orthopedic surgery following. They are hopeful that IV anticoagulation & antibiotics will help salvage his toes and prevent the need for amputation.       Essential hypertension- (present on admission)  Assessment & Plan  -Given risk factors for CAD, placed on lisinopril starting 8/2/2020. BPs improved to 140s-150s since then. Pt with only 1 kidney, congenital.  - Will continue to trend BPs & adjust meds if needed.  -Monitor     Type 2 diabetes mellitus with hyperglycemia, without long-term current use of insulin (HCC)- (present on admission)  Assessment & Plan  -FSBS with a couple of mildly elevated readings today. Will increase lantus from 20 units to 22 units & monitor response.   -A1C 10.4% this admission.   -DM education ordered.   -Continue FSBS ACHS. Continue lantus & SSI.   -DM diet.   -Hypoglycemia protocol in place if needed  Monitor BS    Class 1 obesity due to excess calories without serious comorbidity with body mass index (BMI) of 33.0 to 33.9 in adult- (present on admission)  Assessment & Plan  Body mass index is 32.88 kg/m².  -Could likely benefit from outpatient weight loss counseling after his acute issues have resolved.  This can be arranged through his PCP at the appropriate time.  -Counseled       VTE prophylaxis:  Heparin  ==============================================================================================================================  Please note that this dictation was created using voice recognition software. I have made every reasonable attempt to correct obvious errors, but there may be errors of grammar and possibly content that I did not discover before finalizing the note.    Electronically signed by:  FLOYD French, MSN, APRN, FNP-C  Hospitalist Services  [facility]  [phone]  [email or web address]  08/09/20    1430

## 2020-08-09 NOTE — PROGRESS NOTES
Telemetry Strip     Strip printed: 1514  Measurements from am strip were as follows:  Rhythm: SR with ST depression  HR:   Measurements: 0.16/ 0.10/ 0.38  Ectopy: o-r PVC, r trig, 6bts Vtach             Normal Values  Rhythm SR  HR Range    Measurements 0.12-0.20 / 0.06-0.10  / 0.30-0.52

## 2020-08-09 NOTE — PROGRESS NOTES
Received report from Lesa PIERRE, pt sitting at edge of bed awaiting breakfast, no other needs at this time. Bed in low locked position, call light in reach, will continue to monitor.

## 2020-08-09 NOTE — PROGRESS NOTES
Telemetry Strip     Strip printed: 1513  Measurements from am strip were as follows:  Rhythm: SR-ST  HR:   Measurements: 0.12/ 0.08/ 0.38  Ectopy: f PVC             Normal Values  Rhythm SR  HR Range    Measurements 0.12-0.20 / 0.06-0.10  / 0.30-0.52

## 2020-08-09 NOTE — CARE PLAN
Problem: Venous Thromboembolism (VTW)/Deep Vein Thrombosis (DVT) Prevention:  Goal: Patient will participate in Venous Thrombosis (VTE)/Deep Vein Thrombosis (DVT)Prevention Measures  8/8/2020 2153 by Lesa Rainey R.N.  Outcome: MET  8/8/2020 2152 by Lesa Rainey R.N.  Note: ambulating     Problem: Pain Management  Goal: Pain level will decrease to patient's comfort goal  Outcome: MET  Flowsheets (Taken 8/8/2020 2152)  Pain Rating Scale (NPRS): 0

## 2020-08-10 ENCOUNTER — TELEPHONE (OUTPATIENT)
Dept: MEDICAL GROUP | Facility: PHYSICIAN GROUP | Age: 55
End: 2020-08-10

## 2020-08-10 VITALS
OXYGEN SATURATION: 93 % | RESPIRATION RATE: 17 BRPM | DIASTOLIC BLOOD PRESSURE: 70 MMHG | SYSTOLIC BLOOD PRESSURE: 128 MMHG | BODY MASS INDEX: 32.48 KG/M2 | WEIGHT: 214.29 LBS | HEART RATE: 94 BPM | TEMPERATURE: 98 F | HEIGHT: 68 IN

## 2020-08-10 PROBLEM — I99.8 ISCHEMIA OF TOE: Status: RESOLVED | Noted: 2020-08-02 | Resolved: 2020-08-10

## 2020-08-10 LAB
ANION GAP SERPL CALC-SCNC: 11 MMOL/L (ref 7–16)
BUN SERPL-MCNC: 14 MG/DL (ref 8–22)
CALCIUM SERPL-MCNC: 9 MG/DL (ref 8.4–10.2)
CHLORIDE SERPL-SCNC: 104 MMOL/L (ref 96–112)
CO2 SERPL-SCNC: 22 MMOL/L (ref 20–33)
CREAT SERPL-MCNC: 0.81 MG/DL (ref 0.5–1.4)
ERYTHROCYTE [DISTWIDTH] IN BLOOD BY AUTOMATED COUNT: 42.5 FL (ref 35.9–50)
GLUCOSE BLD-MCNC: 107 MG/DL (ref 65–99)
GLUCOSE BLD-MCNC: 96 MG/DL (ref 65–99)
GLUCOSE SERPL-MCNC: 98 MG/DL (ref 65–99)
HCT VFR BLD AUTO: 46 % (ref 42–52)
HGB BLD-MCNC: 15.5 G/DL (ref 14–18)
MCH RBC QN AUTO: 30.6 PG (ref 27–33)
MCHC RBC AUTO-ENTMCNC: 33.7 G/DL (ref 33.7–35.3)
MCV RBC AUTO: 90.7 FL (ref 81.4–97.8)
PLATELET # BLD AUTO: 341 K/UL (ref 164–446)
PMV BLD AUTO: 9.7 FL (ref 9–12.9)
POTASSIUM SERPL-SCNC: 4.4 MMOL/L (ref 3.6–5.5)
RBC # BLD AUTO: 5.07 M/UL (ref 4.7–6.1)
SODIUM SERPL-SCNC: 137 MMOL/L (ref 135–145)
WBC # BLD AUTO: 8.5 K/UL (ref 4.8–10.8)

## 2020-08-10 PROCEDURE — 700102 HCHG RX REV CODE 250 W/ 637 OVERRIDE(OP): Performed by: HOSPITALIST

## 2020-08-10 PROCEDURE — 700102 HCHG RX REV CODE 250 W/ 637 OVERRIDE(OP): Performed by: NURSE PRACTITIONER

## 2020-08-10 PROCEDURE — 80048 BASIC METABOLIC PNL TOTAL CA: CPT

## 2020-08-10 PROCEDURE — A9270 NON-COVERED ITEM OR SERVICE: HCPCS | Performed by: HOSPITALIST

## 2020-08-10 PROCEDURE — A9270 NON-COVERED ITEM OR SERVICE: HCPCS | Performed by: INTERNAL MEDICINE

## 2020-08-10 PROCEDURE — 85027 COMPLETE CBC AUTOMATED: CPT

## 2020-08-10 PROCEDURE — 700102 HCHG RX REV CODE 250 W/ 637 OVERRIDE(OP): Performed by: INTERNAL MEDICINE

## 2020-08-10 PROCEDURE — 99239 HOSP IP/OBS DSCHRG MGMT >30: CPT | Performed by: HOSPITALIST

## 2020-08-10 PROCEDURE — 82962 GLUCOSE BLOOD TEST: CPT | Mod: 91

## 2020-08-10 PROCEDURE — A9270 NON-COVERED ITEM OR SERVICE: HCPCS | Performed by: NURSE PRACTITIONER

## 2020-08-10 RX ORDER — AMOXICILLIN AND CLAVULANATE POTASSIUM 875; 125 MG/1; MG/1
1 TABLET, FILM COATED ORAL EVERY 12 HOURS
Qty: 4 TAB | Refills: 0 | Status: SHIPPED | OUTPATIENT
Start: 2020-08-10 | End: 2020-08-12

## 2020-08-10 RX ORDER — INSULIN GLARGINE 100 [IU]/ML
22 INJECTION, SOLUTION SUBCUTANEOUS EVERY EVENING
Qty: 9 ML | Refills: 0 | Status: ON HOLD | OUTPATIENT
Start: 2020-08-10 | End: 2020-09-12

## 2020-08-10 RX ORDER — INSULIN LISPRO 200 [IU]/ML
1-6 INJECTION, SOLUTION SUBCUTANEOUS
Qty: 1 PEN | Refills: 3 | Status: SHIPPED
Start: 2020-08-10 | End: 2020-09-08

## 2020-08-10 RX ORDER — ASPIRIN 81 MG/1
81 TABLET ORAL 2 TIMES DAILY
Qty: 60 TAB | Refills: 0 | Status: ON HOLD | OUTPATIENT
Start: 2020-08-10 | End: 2020-09-12

## 2020-08-10 RX ORDER — LISINOPRIL 5 MG/1
5 TABLET ORAL DAILY
Qty: 30 TAB | Refills: 0 | Status: ON HOLD | OUTPATIENT
Start: 2020-08-11 | End: 2020-09-12

## 2020-08-10 RX ORDER — OXYCODONE HYDROCHLORIDE 5 MG/1
5 TABLET ORAL EVERY 8 HOURS PRN
Qty: 9 TAB | Refills: 0 | Status: SHIPPED | OUTPATIENT
Start: 2020-08-10 | End: 2020-08-13

## 2020-08-10 RX ORDER — GLUCOSAMINE HCL/CHONDROITIN SU 500-400 MG
CAPSULE ORAL
Qty: 100 EACH | Refills: 0 | Status: SHIPPED
Start: 2020-08-10 | End: 2020-09-08

## 2020-08-10 RX ADMIN — ASPIRIN 81 MG: 81 TABLET, COATED ORAL at 06:11

## 2020-08-10 RX ADMIN — AMOXICILLIN AND CLAVULANATE POTASSIUM 1 TABLET: 875; 125 TABLET, FILM COATED ORAL at 06:11

## 2020-08-10 RX ADMIN — LISINOPRIL 5 MG: 5 TABLET ORAL at 06:11

## 2020-08-10 NOTE — PROGRESS NOTES
Took report from Goldie PIERRE, pt resting in bed no needs at this time. Bed in low locked position, call light in reach, will continue to monitor.

## 2020-08-10 NOTE — DISCHARGE INSTRUCTIONS
Discharge Instructions    Discharged to home by car with relative. Discharged via wheelchair, hospital escort: Yes.  Special equipment needed: Not Applicable    Be sure to schedule a follow-up appointment with your primary care doctor or any specialists as instructed.     Discharge Plan:   Diet Plan: Discussed  Activity Level: Discussed  Confirmed Follow up Appointment: Appointment Scheduled  Confirmed Symptoms Management: Discussed  Medication Reconciliation Updated: Yes    I understand that a diet low in cholesterol, fat, and sodium is recommended for good health. Unless I have been given specific instructions below for another diet, I accept this instruction as my diet prescription.   Other diet: diabetic    Special Instructions: None    · Is patient discharged on Warfarin / Coumadin?   No     Depression / Suicide Risk    As you are discharged from this [facility] facility, it is important to learn how to keep safe from harming yourself.    Recognize the warning signs:  · Abrupt changes in personality, positive or negative- including increase in energy   · Giving away possessions  · Change in eating patterns- significant weight changes-  positive or negative  · Change in sleeping patterns- unable to sleep or sleeping all the time   · Unwillingness or inability to communicate  · Depression  · Unusual sadness, discouragement and loneliness  · Talk of wanting to die  · Neglect of personal appearance   · Rebelliousness- reckless behavior  · Withdrawal from people/activities they love  · Confusion- inability to concentrate     If you or a loved one observes any of these behaviors or has concerns about self-harm, here's what you can do:  · Talk about it- your feelings and reasons for harming yourself  · Remove any means that you might use to hurt yourself (examples: pills, rope, extension cords, firearm)  · Get professional help from the community (Mental Health, Substance Abuse, psychological counseling)  · Do not  be alone:Call your Safe Contact- someone whom you trust who will be there for you.  · Call your local CRISIS HOTLINE 383-5918 or 822-665-4681  · Call your local Children's Mobile Crisis Response Team [place] (215) 943-4206 or www.[employer]  · Call the toll free National Suicide Prevention Hotlines   · National Suicide Prevention Lifeline 135-635-MGZG (0957)  · NVMdurance Line Network 800-SUICIDE (714-1536)          Toe Amputation  Toe amputation is a surgical procedure to remove all or part of a toe. A person may have this procedure if:  · Tissue in the toe is dying because of poor blood supply.  · There is a severe infection in the toe.  · There is a cancerous tumor in the toe.  · There is a traumatic injury in the toe.  Removing the toe keeps nearby tissue healthy. If the toe is infected, removing it helps to keep the infection from spreading.  Tell a health care provider about:  · Any allergies you have.  · All medicines you are taking, including vitamins, herbs, eye drops, creams, and over-the-counter medicines.  · Any problems you or family members have had with anesthetic medicines.  · Any blood disorders you have.  · Any surgeries you have had.  · Any medical conditions you have or have had.  · Whether you are pregnant or may be pregnant.  What are the risks?  Generally, this is a safe procedure. However, problems may occur, including:  · Bleeding.  · Buildup of blood and fluid (hematoma).  · Infection.  · Allergic reactions to medicines.  · Tissue death in the flap of skin (flap necrosis).  · Trouble with healing.  · Minor changes in the way that you walk (your gait).  · Feeling pain in the area that was removed (phantom pain). This is rare.  What happens before the procedure?  Staying hydrated  Follow instructions from your health care provider about hydration, which may include:  · Up to 2 hours before the procedure - you may continue to drink clear liquids, such as water, clear fruit  juice, black coffee, and plain tea.    Eating and drinking restrictions  Follow instructions from your health care provider about eating and drinking, which may include:  · 8 hours before the procedure - stop eating heavy meals or foods, such as meat, fried foods, or fatty foods.  · 6 hours before the procedure - stop eating light meals or foods, such as toast or cereal.  · 6 hours before the procedure - stop drinking milk or drinks that contain milk.  · 2 hours before the procedure - stop drinking clear liquids.  Medicines  Ask your health care provider about:  · Changing or stopping your regular medicines. This is especially important if you are taking diabetes medicines or blood thinners.  · Taking medicines such as aspirin and ibuprofen. These medicines can thin your blood. Do not take these medicines unless your health care provider tells you to take them.  · Taking over-the-counter medicines, vitamins, herbs, and supplements.  General instructions  · Ask your health care provider:  ? How your surgery site will be marked.  ? What steps will be taken to help prevent infection. These may include:  § Washing skin with a germ-killing soap.  § Taking antibiotic medicine.  · Plan to have someone take you home from the hospital or clinic.  · If you will be going home right after the procedure, plan to have someone with you for 24 hours.  What happens during the procedure?  · An IV will be inserted into one of your veins.  · You will be given one or more of the following:  ? A medicine to help you relax (sedative).  ? A medicine to numb the area (local anesthetic).  ? A medicine to make you fall asleep (general anesthetic).  ? A medicine that is injected into your spine to numb the area below and slightly above the injection site (spinal anesthetic).  ? A medicine that is injected into an area of your body to numb everything below the injection site (regional anesthetic).  · Your surgeon will savannah the area of your toe  for removal.  · Your surgeon will make an incision in your toe.  · The dead tissue and bone will be removed.  · Nerves and blood vessels will be tied or heated with a tool to stop bleeding.  · The area will be drained and cleaned.  · If only part of a toe is removed, the remaining part will be covered with a flap of skin.  · The incision will be treated in one of these ways:  ? It will be closed with stitches (sutures).  ? It will be left open to heal if there is an infection.  · The incision area may be packed with gauze and covered with bandages (dressings).  · Tissue samples may be sent to a lab to be examined under a microscope.  The procedure may vary among health care providers and hospitals.  What happens after the procedure?  · Your blood pressure, heart rate, breathing rate, and blood oxygen level will be monitored until you leave the hospital or clinic.  · Your foot will be raised up (elevated) to relieve swelling.  · You will be monitored for pain.  · You will be given pain medicines and antibiotics.  · Your health care provider or physical therapist will help you move around as soon as possible.  · Do not drive for 24 hours if you were given a sedative during your procedure.  Summary  · Toe amputation is a surgical procedure to remove all or part of a toe.  · Before the procedure, follow instructions from your health care provider about taking medicines and about eating or drinking restrictions.  · During the procedure, steps will be taken to reduce your risk of infection.  · After the procedure, you will be given pain medicines and antibiotics.  · After the procedure, your foot will be raised up (elevated) to relieve swelling.  This information is not intended to replace advice given to you by your health care provider. Make sure you discuss any questions you have with your health care provider.  Document Released: 11/28/2016 Document Revised: 12/11/2019 Document Reviewed: 12/11/2019  Elsevier Patient  Education © 2020 [employer]    FOLLOW UP ITEMS POST DISCHARGE  Please call [phone] to schedule PCP appointment for patient.    Required specialty appointments include:       Discharge Instructions per DUY Patterson  -Follow-up with PCP -please call number of labs to make an appointment  -Follow-up with [facility] orthopedics -Dr. Gutierrez  -Finish course of antibiotics until 8/12/2020  -Start taking ASA 81 mg twice daily daily for at least 30 days until seen by orthopedics  -Start insulin therapy as prescribed -have PCP manage as an outpatient  -Monitor right fourth toe for any signs of ischemia    DIET: Diabetic    ACTIVITY: As tolerated; please use hard sole shoe until instructed by orthopedics    DIAGNOSIS: Right fourth toe pain    Return to ER if symptoms persist, chest pain, palpitations, shortness of breath, numbness, tingling, weakness, and high fevers.

## 2020-08-10 NOTE — CARE PLAN
Problem: Communication  Goal: The ability to communicate needs accurately and effectively will improve  Outcome: PROGRESSING AS EXPECTED  Note: Pt verbalizes needs, call light in reach.      Problem: Infection  Goal: Will remain free from infection  Outcome: PROGRESSING AS EXPECTED  Note: Educated pt on importance of hand hygiene before meals and after using bathroom. Educated pt on importance of keeping foot wound clean and dry will perform dressing change this shift.

## 2020-08-10 NOTE — DISCHARGE SUMMARY
Discharge Summary    CHIEF COMPLAINT ON ADMISSION  Chief Complaint   Patient presents with   • Wound Check       Reason for Admission  toe injuries,      Admission Date  7/31/2020    CODE STATUS  Full Code    HPI & HOSPITAL COURSE    Mr. Eugene is a 54-year-old male with a PMHx of uncontrolled diabetes mellitus and congenital single kidney who presented to the emergency department on 7/31/2020 with complaints of increasing pain to his right foot including discoloration to his fourth and fifth toes after he sustained some form of injury to it a few weeks prior.  He was diagnosed with a diabetic foot infection and sepsis and subsequently started on IV fluids, IV antibiotics, and admitted to the hospital for further evaluation and treatment. ABIs were done and identified embolic ischemia of the toes. Echocardiogram was negative for a cardiac source and had normal systolic and diastolic function. He was started on a heparin drip and orthopedic surgery was consulted. Patient underwent 5th toe amputation on 8/6/2020 with orthopedic surgery. Discussed with Dr Mallory and he recommended that further anticoagulation will salvage the 4th toe and prevent the need for 4th toe amputation.    Patient admitted for further evaluation and treatment.             During his hospital stay, patient was placed on a heparin drip.  Right fifth toe amputation done on 8/5/2020 improving.  Right fourth toe improving, with discoloration and less pain.  Patient educated in regards to signs and symptoms of ischemia.  Patient highly recommended to follow-up with Dr. Gutierrez, orthopedics.  Patient to finish course of Augmentin until 8/12, along with a stay 81 mg twice daily for at least 30 days.  Patient highly recommended to follow-up with PCP for diabetes management.  Patient will be prescribed insulin therapy until patient follows with PCP.  All questions and concerns answered prior to me discharge.  Patient will be DC home.    Therefore,  he is discharged in good and stable condition to home with close outpatient follow-up.    The patient recovered much more quickly than anticipated on admission.    Discharge Date  08/10/20      FOLLOW UP ITEMS POST DISCHARGE  Please call [phone] to schedule PCP appointment for patient.    Required specialty appointments include:       Discharge Instructions per DUY Patterson  -Follow-up with PCP -please call number of labs to make an appointment  -Follow-up with [facility] orthopedics -Dr. Gutierrez  -Finish course of antibiotics until 8/12/2020  -Start taking ASA 81 mg twice daily daily for at least 30 days until seen by orthopedics  -Start insulin therapy as prescribed -have PCP manage as an outpatient  -Monitor right fourth toe for any signs of ischemia    DIET: Diabetic    ACTIVITY: As tolerated; please use hard sole shoe until instructed by orthopedics    DIAGNOSIS: Right fourth toe pain    Return to ER if symptoms persist, chest pain, palpitations, shortness of breath, numbness, tingling, weakness, and high fevers.    DISCHARGE DIAGNOSES  Principal Problem:    Diabetic foot infection (HCC) POA: Yes  Active Problems:    Type 2 diabetes mellitus with hyperglycemia, without long-term current use of insulin (HCC) POA: Yes    Essential hypertension POA: Yes    Class 1 obesity due to excess calories without serious comorbidity with body mass index (BMI) of 33.0 to 33.9 in adult POA: Yes  Resolved Problems:    Sepsis (HCC) POA: Unknown    Ischemia of toes POA: Yes    MRSA infection POA: Unknown      FOLLOW UP  No future appointments.  Liban Mallory M.D.  [address]  [address]  [place] [id number] [phone]            MEDICATIONS ON DISCHARGE     Medication List      START taking these medications      Instructions   Alcohol Swabs   Doctor's comments: Per formulary preference. ICD-10 code: E11.65 Uncontrolled type 2 Diabetes Mellitus  Wipe site with prep pad prior  to injection.     amoxicillin-clavulanate 875-125 MG Tabs  Commonly known as: AUGMENTIN   Take 1 Tab by mouth every 12 hours for 2 days.  Dose: 1 Tab     aspirin 81 MG EC tablet   Take 1 Tab by mouth 2 Times a Day for 30 days.  Dose: 81 mg     HumaLOG KwikPen 200 UNIT/ML Sopn  Generic drug: Insulin Lispro   Doctor's comments: For glucose: 70   - 150  mg/dL = 0 Units, 151 - 200  mg/dL = 1 Units, 201 - 250  mg/dL = 2 Units, 251 - 300  mg/dL = 3 Units, 301 - 350  mg/dL  =4 Units, 351 - 400 mg/dL   = 5 Units, Over 400 mg/dL   = 6 Units  Inject 1-6 Units as instructed 3 times a day before meals for 30 days.  Dose: 1-6 Units     Insulin Pen Needle 32 G x 4 mm   Doctor's comments: Per patient/formulary preference. ICD-10 code: E11.65 Uncontrolled type 2 Diabetes Mellitus  Use one pen needle in pen device to inject insulin four times daily.     Lantus SoloStar 100 UNIT/ML Sopn injection  Generic drug: insulin glargine   Inject 22 Units as instructed every evening for 30 days.  Dose: 22 Units     lisinopril 5 MG Tabs  Start taking on: August 11, 2020  Commonly known as: PRINIVIL   Take 1 Tab by mouth every day for 30 days.  Dose: 5 mg     oxyCODONE immediate-release 5 MG Tabs  Commonly known as: ROXICODONE   Take 1 Tab by mouth every 8 hours as needed for Severe Pain for up to 3 days.  Dose: 5 mg            Allergies  Allergies   Allergen Reactions   • Pcn [Penicillins] Hives     Tolerated Unasyn 8/2020       DIET  Orders Placed This Encounter   Procedures   • Diet Order Diabetic (Advance as tolerated)     Standing Status:   Standing     Number of Occurrences:   1     Order Specific Question:   Diet:     Answer:   Diabetic [3]     Comments:   Advance as tolerated       ACTIVITY  As tolerated.  Weight bearing as tolerated    CONSULTATIONS  Orthopedics - Dr. Gutierrez    PROCEDURES  NONE    IMAGING  EC-ECHOCARDIOGRAM COMPLETE W/ CONT   Final Result      US-AGUSTINA SINGLE LEVEL BILAT   Final Result      CT-FOOT W/O PLUS RECONS  RIGHT   Final Result      1.  Soft tissue swelling and small skin ulcer in the plantar aspect of the right foot below the distal aspect of the proximal phalanx of the 2nd toe.      2.  No CT evidence of osteomyelitis.      3.  No subcutaneous abscess identified.      DX-FOOT-COMPLETE 3+ RIGHT   Final Result         1.  No acute traumatic bony injury.            LABORATORY  Lab Results   Component Value Date    SODIUM 137 08/10/2020    POTASSIUM 4.4 08/10/2020    CHLORIDE 104 08/10/2020    CO2 22 08/10/2020    GLUCOSE 98 08/10/2020    BUN 14 08/10/2020    CREATININE 0.81 08/10/2020        Lab Results   Component Value Date    WBC 8.5 08/10/2020    HEMOGLOBIN 15.5 08/10/2020    HEMATOCRIT 46.0 08/10/2020    PLATELETCT 341 08/10/2020        Total time of the discharge process exceeds 36 minutes.  ==============================================================================================================================  Please note that this dictation was created using voice recognition software. I have made every reasonable attempt to correct obvious errors, but there may be errors of grammar and possibly content that I did not discover before finalizing the note.    Electronically signed by:  FLOYD French, MSN, APRN, FNP-C  Hospitalist Services  [facility]  [phone]  [email or web address]  08/10/20    1172

## 2020-08-10 NOTE — CARE PLAN
Problem: Communication  Goal: The ability to communicate needs accurately and effectively will improve  Outcome: PROGRESSING AS EXPECTED  Note: Plan of care discussed. Patient able to verbalize any questions and needs clearly and appropriately.      Problem: Safety  Goal: Will remain free from injury  Outcome: PROGRESSING AS EXPECTED  Note: Patient educated on level of fall risk. Bed is in low locked position.      Problem: Bowel/Gastric:  Goal: Normal bowel function is maintained or improved  Outcome: PROGRESSING AS EXPECTED  Flowsheets (Taken 8/8/2020 2000 by Lesa Rainey RMOHIT)  Last BM: 08/08/20  Note: Bowel sounds normo active in all quadrants.

## 2020-08-10 NOTE — PROGRESS NOTES
Telemetry Strip     Strip printed: 0245  Measurements from am strip were as follows:  Rhythm: SR  HR: 72  Measurements: .14/.08/.38          Telemetry Shift Summary:    Rhythm: SR/ST  HR:   Ectopy:Rare-occasional PVC        Normal Values  Rhythm SR  HR Range    Measurements 0.12-0.20 / 0.06-0.10  / 0.30-0.52

## 2020-08-10 NOTE — PROGRESS NOTES
Discharge order written. IV removed, patient tolerated. All personal belongings are in possession. AVS printed, reviewed and copy signed and placed on the chart. Patient has no further questions. Prescriptions sent to [facility] pharmacy Discharged in satisfactory condition.PT left unit with self via wheelchair. Staff escort  RN.

## 2020-08-10 NOTE — PROGRESS NOTES
2020 - Assessment complete. Patient is A&Ox4. No complaints of pain. Patient denies any further needs at this time. Safety precautions in place. Will continue to monitor.   0115 - Patient resting in bed with eyes closed. Respirations are even and unlabored. Safety precautions in place. Call bell within reach. Will continue to monitor.   0610 - Due medications administered as ordered. Patient states he slept well overnight and denies any further needs at this time. Will continue to monitor.    0657 - Report given to GABBY Cortez. Patient resting in bed. Safety precautions in place. Care released.

## 2020-08-10 NOTE — PROGRESS NOTES
Received report from GABBY Cortez. Patient sitting on edge of bed on computer. POC discussed. Patient has no complaints of pain and denies any further needs at this time. Safety precautions in place. Call bell within reach. Will continue to monitor.

## 2020-08-11 NOTE — PROGRESS NOTES
Telemetry Strip     Strip printed: 1233  Measurements from am strip were as follows:  Rhythm: SR-ST  HR:   Measurements: 0.16/ 0.08/ 0.36  Ectopy: f PVC, r big             Normal Values  Rhythm SR  HR Range    Measurements 0.12-0.20 / 0.06-0.10  / 0.30-0.52

## 2020-08-13 ENCOUNTER — OFFICE VISIT (OUTPATIENT)
Dept: MEDICAL GROUP | Facility: LAB | Age: 55
End: 2020-08-13
Payer: COMMERCIAL

## 2020-08-13 VITALS
WEIGHT: 214 LBS | DIASTOLIC BLOOD PRESSURE: 70 MMHG | OXYGEN SATURATION: 94 % | TEMPERATURE: 97.4 F | HEIGHT: 67 IN | HEART RATE: 95 BPM | SYSTOLIC BLOOD PRESSURE: 125 MMHG | BODY MASS INDEX: 33.59 KG/M2

## 2020-08-13 DIAGNOSIS — E11.65 TYPE 2 DIABETES MELLITUS WITH HYPERGLYCEMIA, WITHOUT LONG-TERM CURRENT USE OF INSULIN (HCC): ICD-10-CM

## 2020-08-13 DIAGNOSIS — E66.09 CLASS 1 OBESITY DUE TO EXCESS CALORIES WITHOUT SERIOUS COMORBIDITY WITH BODY MASS INDEX (BMI) OF 33.0 TO 33.9 IN ADULT: ICD-10-CM

## 2020-08-13 DIAGNOSIS — Z76.89 ESTABLISHING CARE WITH NEW DOCTOR, ENCOUNTER FOR: ICD-10-CM

## 2020-08-13 DIAGNOSIS — Z12.11 SCREEN FOR COLON CANCER: ICD-10-CM

## 2020-08-13 PROCEDURE — 99204 OFFICE O/P NEW MOD 45 MIN: CPT | Performed by: INTERNAL MEDICINE

## 2020-08-13 ASSESSMENT — PATIENT HEALTH QUESTIONNAIRE - PHQ9: CLINICAL INTERPRETATION OF PHQ2 SCORE: 0

## 2020-08-13 ASSESSMENT — FIBROSIS 4 INDEX: FIB4 SCORE: 0.7

## 2020-08-13 NOTE — PROGRESS NOTES
CC: Mc Eugene is a 54 y.o. male is suffering from No chief complaint on file.        SUBJECTIVE:  1. Establishing care with new doctor, encounter for  Cristopher is here for establishment of care, has a history of type 2 diabetes probably close to 15 years, recently underwent amputation associated with his right little toe.  Is being followed by Dr. Gutierrez trying to preserve function associated with the right fourth digit.    2. Type 2 diabetes mellitus with hyperglycemia, without long-term current use of insulin ([facility])  History of poorly controlled type 2 diabetes    3. Class 1 obesity due to excess calories without serious comorbidity with body mass index (BMI) of 33.0 to 33.9 in adult  Discussed with the patient importance of diet exercise to lose weight    4. Screen for colon cancer  Screening referral written for colon cancer        Past social, family, history:   Social History     Tobacco Use   • Smoking status: Former Smoker   • Smokeless tobacco: Never Used   Substance Use Topics   • Alcohol use: Not Currently   • Drug use: Yes     Comment: marijuana vape         MEDICATIONS:    Current Outpatient Medications:   •  aspirin EC 81 MG EC tablet, Take 1 Tab by mouth 2 Times a Day for 30 days., Disp: 60 Tab, Rfl: 0  •  LANTUS SOLOSTAR 100 UNIT/ML Solution Pen-injector injection, Inject 22 Units as instructed every evening for 30 days., Disp: 9 mL, Rfl: 0  •  HUMALOG KWIKPEN 200 UNIT/ML Solution Pen-injector, Inject 1-6 Units as instructed 3 times a day before meals for 30 days., Disp: 1 PEN, Rfl: 3  •  lisinopril (PRINIVIL) 5 MG Tab, Take 1 Tab by mouth every day for 30 days., Disp: 30 Tab, Rfl: 0  •  Alcohol Swabs, Wipe site with prep pad prior to injection., Disp: 100 Each, Rfl: 0  •  Insulin Pen Needle 32 G x 4 mm, Use one pen needle in pen device to inject insulin four times daily., Disp: 100 Each, Rfl: 0  •  oxyCODONE immediate-release (ROXICODONE) 5 MG Tab, Take 1 Tab by mouth every 8 hours as  needed for Severe Pain for up to 3 days., Disp: 9 Tab, Rfl: 0    PROBLEMS:  Patient Active Problem List    Diagnosis Date Noted   • Diabetic foot infection (HCC) 08/01/2020     Priority: High   • Essential hypertension 08/03/2020     Priority: Medium   • Type 2 diabetes mellitus with hyperglycemia, without long-term current use of insulin (HCC) 08/01/2020     Priority: Medium   • Class 1 obesity due to excess calories without serious comorbidity with body mass index (BMI) of 33.0 to 33.9 in adult 08/01/2020     Priority: Low       REVIEW OF SYSTEMS:  General: Patient denies any problems with nausea vomiting fever chills chest pain or tightness.  Head:  No history of strokes seizures severe head trauma or loss of consciousness.   HEENT: No history of any significant vision loss, hearing loss, changes in sense of smell hoarseness  Heart: No chest pain tightness squeezing.   Lungs: No recurrent asthma bronchitis pneumonia.   Gastrointestinal: No history of black or bloody stools or  Constipation colonoscopy was done.  Genitourinary:  No increased frequency urgency pain and burning with urination  Musculoskeletal: No joint pain or discomfort.  Status post amputation right little toe  Skin: No skin changes      PHYSICAL EXAM   Constitutional: Alert, cooperative, not in acute distress.  Cardiovascular:  Rate Rhythm is regular without murmurs rubs clicks.     Thorax & Lungs: Clear to auscultation, no wheezing, rhonchi, or rales  HENT: Normocephalic, Atraumatic.  Eyes: PERRLA, EOMI, Conjunctiva normal.   Neck: Trachia is midline no swelling of the thyroid.   Lymphatic: No lymphadenopathy noted.   Abdomin: Soft non-tender, no rebound, no guarding.   Skin: Warm, Dry, No erythema, No rash.   Extremities: Atraumatic with symmetric distal pulses, No edema, No tenderness, No cyanosis, No clubbing.   Musculoskeletal: Dusky appearing right fourth digit, not painful to palpation.  Neurologic: Alert & oriented x 3, cranial nerves II  "through XII are intact, Normal motor function, Normal sensory function, No focal deficits noted.   Psychiatric: Affect normal, Judgment normal, Mood normal.     VITAL SIGNS:/70   Pulse 95   Temp 36.3 °C (97.4 °F) (Temporal)   Ht 1.702 m (5' 7\")   Wt 97.1 kg (214 lb)   SpO2 94%   BMI 33.52 kg/m²     Labs: Reviewed    Assessment:                                                     Plan:    1. Establishing care with new doctor, encounter for  Patient is stable, status post amputation right little toe likely secondary to the effects of diabetes    2. Type 2 diabetes mellitus with hyperglycemia, without long-term current use of insulin ([facility])  History of type 2 diabetes, negative monofilament testing.  Dusky appearing right fourth digit  - Diabetic Monofilament Lower Extremity Exam  - Lipid Profile; Future  - MICROALBUMIN CREAT RATIO URINE (LAB COLLECT); Future  - REFERRAL TO OPHTHALMOLOGY  - Patient identified as having weight management issue.  Appropriate orders and counseling given.    3. Class 1 obesity due to excess calories without serious comorbidity with body mass index (BMI) of 33.0 to 33.9 in adult  Discussed diet exercise  - Diabetic Monofilament Lower Extremity Exam  - Lipid Profile; Future  - MICROALBUMIN CREAT RATIO URINE (LAB COLLECT); Future  - REFERRAL TO OPHTHALMOLOGY  - Patient identified as having weight management issue.  Appropriate orders and counseling given.    4. Screen for colon cancer  Referral written  - REFERRAL TO GI FOR COLONOSCOPY  - Patient identified as having weight management issue.  Appropriate orders and counseling given.          "

## 2020-09-08 ENCOUNTER — APPOINTMENT (OUTPATIENT)
Dept: RADIOLOGY | Facility: MEDICAL CENTER | Age: 55
DRG: 629 | End: 2020-09-08
Attending: HOSPITALIST
Payer: COMMERCIAL

## 2020-09-08 ENCOUNTER — HOSPITAL ENCOUNTER (INPATIENT)
Facility: MEDICAL CENTER | Age: 55
LOS: 4 days | DRG: 629 | End: 2020-09-12
Attending: EMERGENCY MEDICINE | Admitting: HOSPITALIST
Payer: COMMERCIAL

## 2020-09-08 DIAGNOSIS — E11.628 DIABETIC FOOT INFECTION (HCC): ICD-10-CM

## 2020-09-08 DIAGNOSIS — L97.509 DIABETIC FOOT ULCER WITH OSTEOMYELITIS (HCC): ICD-10-CM

## 2020-09-08 DIAGNOSIS — E11.621 DIABETIC FOOT ULCER WITH OSTEOMYELITIS (HCC): ICD-10-CM

## 2020-09-08 DIAGNOSIS — M86.9 DIABETIC FOOT ULCER WITH OSTEOMYELITIS (HCC): ICD-10-CM

## 2020-09-08 DIAGNOSIS — L08.9 DIABETIC FOOT INFECTION (HCC): ICD-10-CM

## 2020-09-08 DIAGNOSIS — E11.69 DIABETIC FOOT ULCER WITH OSTEOMYELITIS (HCC): ICD-10-CM

## 2020-09-08 PROBLEM — D72.829 LEUKOCYTOSIS: Status: ACTIVE | Noted: 2020-09-08

## 2020-09-08 LAB
ALBUMIN SERPL BCP-MCNC: 3.9 G/DL (ref 3.2–4.9)
ALBUMIN/GLOB SERPL: 1.1 G/DL
ALP SERPL-CCNC: 64 U/L (ref 30–99)
ALT SERPL-CCNC: 21 U/L (ref 2–50)
ANION GAP SERPL CALC-SCNC: 12 MMOL/L (ref 7–16)
AST SERPL-CCNC: 18 U/L (ref 12–45)
BASOPHILS # BLD AUTO: 0.9 % (ref 0–1.8)
BASOPHILS # BLD: 0.12 K/UL (ref 0–0.12)
BILIRUB SERPL-MCNC: 0.2 MG/DL (ref 0.1–1.5)
BUN SERPL-MCNC: 18 MG/DL (ref 8–22)
CALCIUM SERPL-MCNC: 9.3 MG/DL (ref 8.4–10.2)
CHLORIDE SERPL-SCNC: 104 MMOL/L (ref 96–112)
CO2 SERPL-SCNC: 23 MMOL/L (ref 20–33)
CREAT SERPL-MCNC: 1.04 MG/DL (ref 0.5–1.4)
CRP SERPL HS-MCNC: 0.71 MG/DL (ref 0–0.75)
EOSINOPHIL # BLD AUTO: 0.29 K/UL (ref 0–0.51)
EOSINOPHIL NFR BLD: 2.1 % (ref 0–6.9)
ERYTHROCYTE [DISTWIDTH] IN BLOOD BY AUTOMATED COUNT: 41.9 FL (ref 35.9–50)
EST. AVERAGE GLUCOSE BLD GHB EST-MCNC: 192 MG/DL
GLOBULIN SER CALC-MCNC: 3.4 G/DL (ref 1.9–3.5)
GLUCOSE BLD-MCNC: 89 MG/DL (ref 65–99)
GLUCOSE BLD-MCNC: 96 MG/DL (ref 65–99)
GLUCOSE SERPL-MCNC: 152 MG/DL (ref 65–99)
HBA1C MFR BLD: 8.3 % (ref 0–5.6)
HCT VFR BLD AUTO: 46.2 % (ref 42–52)
HGB BLD-MCNC: 16 G/DL (ref 14–18)
IMM GRANULOCYTES # BLD AUTO: 0.05 K/UL (ref 0–0.11)
IMM GRANULOCYTES NFR BLD AUTO: 0.4 % (ref 0–0.9)
LYMPHOCYTES # BLD AUTO: 3.06 K/UL (ref 1–4.8)
LYMPHOCYTES NFR BLD: 22.5 % (ref 22–41)
MCH RBC QN AUTO: 30.5 PG (ref 27–33)
MCHC RBC AUTO-ENTMCNC: 34.6 G/DL (ref 33.7–35.3)
MCV RBC AUTO: 88 FL (ref 81.4–97.8)
MONOCYTES # BLD AUTO: 0.85 K/UL (ref 0–0.85)
MONOCYTES NFR BLD AUTO: 6.3 % (ref 0–13.4)
NEUTROPHILS # BLD AUTO: 9.2 K/UL (ref 1.82–7.42)
NEUTROPHILS NFR BLD: 67.8 % (ref 44–72)
NRBC # BLD AUTO: 0 K/UL
NRBC BLD-RTO: 0 /100 WBC
PLATELET # BLD AUTO: 304 K/UL (ref 164–446)
PMV BLD AUTO: 10.1 FL (ref 9–12.9)
POTASSIUM SERPL-SCNC: 4.2 MMOL/L (ref 3.6–5.5)
PROT SERPL-MCNC: 7.3 G/DL (ref 6–8.2)
RBC # BLD AUTO: 5.25 M/UL (ref 4.7–6.1)
SODIUM SERPL-SCNC: 139 MMOL/L (ref 135–145)
WBC # BLD AUTO: 13.6 K/UL (ref 4.8–10.8)

## 2020-09-08 PROCEDURE — 85025 COMPLETE CBC W/AUTO DIFF WBC: CPT

## 2020-09-08 PROCEDURE — 99223 1ST HOSP IP/OBS HIGH 75: CPT | Performed by: HOSPITALIST

## 2020-09-08 PROCEDURE — 700102 HCHG RX REV CODE 250 W/ 637 OVERRIDE(OP): Performed by: HOSPITALIST

## 2020-09-08 PROCEDURE — 87147 CULTURE TYPE IMMUNOLOGIC: CPT

## 2020-09-08 PROCEDURE — 93922 UPR/L XTREMITY ART 2 LEVELS: CPT

## 2020-09-08 PROCEDURE — 73718 MRI LOWER EXTREMITY W/O DYE: CPT | Mod: RT

## 2020-09-08 PROCEDURE — U0003 INFECTIOUS AGENT DETECTION BY NUCLEIC ACID (DNA OR RNA); SEVERE ACUTE RESPIRATORY SYNDROME CORONAVIRUS 2 (SARS-COV-2) (CORONAVIRUS DISEASE [COVID-19]), AMPLIFIED PROBE TECHNIQUE, MAKING USE OF HIGH THROUGHPUT TECHNOLOGIES AS DESCRIBED BY CMS-2020-01-R: HCPCS

## 2020-09-08 PROCEDURE — A9270 NON-COVERED ITEM OR SERVICE: HCPCS | Performed by: HOSPITALIST

## 2020-09-08 PROCEDURE — C9803 HOPD COVID-19 SPEC COLLECT: HCPCS | Performed by: HOSPITALIST

## 2020-09-08 PROCEDURE — 700111 HCHG RX REV CODE 636 W/ 250 OVERRIDE (IP): Performed by: EMERGENCY MEDICINE

## 2020-09-08 PROCEDURE — 700105 HCHG RX REV CODE 258: Performed by: EMERGENCY MEDICINE

## 2020-09-08 PROCEDURE — 87040 BLOOD CULTURE FOR BACTERIA: CPT

## 2020-09-08 PROCEDURE — 82962 GLUCOSE BLOOD TEST: CPT | Mod: 91

## 2020-09-08 PROCEDURE — 99285 EMERGENCY DEPT VISIT HI MDM: CPT

## 2020-09-08 PROCEDURE — 83036 HEMOGLOBIN GLYCOSYLATED A1C: CPT

## 2020-09-08 PROCEDURE — 700105 HCHG RX REV CODE 258: Performed by: HOSPITALIST

## 2020-09-08 PROCEDURE — 87077 CULTURE AEROBIC IDENTIFY: CPT

## 2020-09-08 PROCEDURE — 86140 C-REACTIVE PROTEIN: CPT

## 2020-09-08 PROCEDURE — 80053 COMPREHEN METABOLIC PANEL: CPT

## 2020-09-08 PROCEDURE — 87205 SMEAR GRAM STAIN: CPT

## 2020-09-08 PROCEDURE — 770006 HCHG ROOM/CARE - MED/SURG/GYN SEMI*

## 2020-09-08 PROCEDURE — 87186 SC STD MICRODIL/AGAR DIL: CPT

## 2020-09-08 PROCEDURE — 700111 HCHG RX REV CODE 636 W/ 250 OVERRIDE (IP): Performed by: HOSPITALIST

## 2020-09-08 PROCEDURE — 87070 CULTURE OTHR SPECIMN AEROBIC: CPT

## 2020-09-08 PROCEDURE — 96365 THER/PROPH/DIAG IV INF INIT: CPT

## 2020-09-08 RX ORDER — ONDANSETRON 2 MG/ML
4 INJECTION INTRAMUSCULAR; INTRAVENOUS EVERY 4 HOURS PRN
Status: DISCONTINUED | OUTPATIENT
Start: 2020-09-08 | End: 2020-09-12 | Stop reason: HOSPADM

## 2020-09-08 RX ORDER — INSULIN LISPRO 200 [IU]/ML
1-6 INJECTION, SOLUTION SUBCUTANEOUS 2 TIMES DAILY WITH MEALS
COMMUNITY
End: 2021-07-20

## 2020-09-08 RX ORDER — AMOXICILLIN 250 MG
2 CAPSULE ORAL 2 TIMES DAILY
Status: DISCONTINUED | OUTPATIENT
Start: 2020-09-08 | End: 2020-09-12 | Stop reason: HOSPADM

## 2020-09-08 RX ORDER — PROMETHAZINE HYDROCHLORIDE 25 MG/1
12.5-25 TABLET ORAL EVERY 4 HOURS PRN
Status: DISCONTINUED | OUTPATIENT
Start: 2020-09-08 | End: 2020-09-12 | Stop reason: HOSPADM

## 2020-09-08 RX ORDER — POLYETHYLENE GLYCOL 3350 17 G/17G
1 POWDER, FOR SOLUTION ORAL
Status: DISCONTINUED | OUTPATIENT
Start: 2020-09-08 | End: 2020-09-12 | Stop reason: HOSPADM

## 2020-09-08 RX ORDER — LISINOPRIL 5 MG/1
5 TABLET ORAL DAILY
Status: DISCONTINUED | OUTPATIENT
Start: 2020-09-09 | End: 2020-09-12 | Stop reason: HOSPADM

## 2020-09-08 RX ORDER — INSULIN GLARGINE 100 [IU]/ML
22 INJECTION, SOLUTION SUBCUTANEOUS EVERY EVENING
Status: DISCONTINUED | OUTPATIENT
Start: 2020-09-08 | End: 2020-09-11

## 2020-09-08 RX ORDER — SODIUM CHLORIDE 9 MG/ML
1000 INJECTION, SOLUTION INTRAVENOUS ONCE
Status: COMPLETED | OUTPATIENT
Start: 2020-09-08 | End: 2020-09-08

## 2020-09-08 RX ORDER — LABETALOL HYDROCHLORIDE 5 MG/ML
10 INJECTION, SOLUTION INTRAVENOUS EVERY 4 HOURS PRN
Status: DISCONTINUED | OUTPATIENT
Start: 2020-09-08 | End: 2020-09-12 | Stop reason: HOSPADM

## 2020-09-08 RX ORDER — BISACODYL 10 MG
10 SUPPOSITORY, RECTAL RECTAL
Status: DISCONTINUED | OUTPATIENT
Start: 2020-09-08 | End: 2020-09-12 | Stop reason: HOSPADM

## 2020-09-08 RX ORDER — ACETAMINOPHEN 325 MG/1
650 TABLET ORAL EVERY 6 HOURS PRN
Status: DISCONTINUED | OUTPATIENT
Start: 2020-09-08 | End: 2020-09-12 | Stop reason: HOSPADM

## 2020-09-08 RX ORDER — ONDANSETRON 4 MG/1
4 TABLET, ORALLY DISINTEGRATING ORAL EVERY 4 HOURS PRN
Status: DISCONTINUED | OUTPATIENT
Start: 2020-09-08 | End: 2020-09-12 | Stop reason: HOSPADM

## 2020-09-08 RX ORDER — CEPHALEXIN 500 MG/1
500 CAPSULE ORAL 2 TIMES DAILY
Status: ON HOLD | COMMUNITY
Start: 2020-09-01 | End: 2020-09-12

## 2020-09-08 RX ORDER — PROCHLORPERAZINE EDISYLATE 5 MG/ML
5-10 INJECTION INTRAMUSCULAR; INTRAVENOUS EVERY 4 HOURS PRN
Status: DISCONTINUED | OUTPATIENT
Start: 2020-09-08 | End: 2020-09-12 | Stop reason: HOSPADM

## 2020-09-08 RX ORDER — SODIUM CHLORIDE 9 MG/ML
INJECTION, SOLUTION INTRAVENOUS CONTINUOUS
Status: DISCONTINUED | OUTPATIENT
Start: 2020-09-08 | End: 2020-09-11

## 2020-09-08 RX ORDER — HEPARIN SODIUM 5000 [USP'U]/ML
5000 INJECTION, SOLUTION INTRAVENOUS; SUBCUTANEOUS EVERY 8 HOURS
Status: DISCONTINUED | OUTPATIENT
Start: 2020-09-08 | End: 2020-09-12 | Stop reason: HOSPADM

## 2020-09-08 RX ORDER — PROMETHAZINE HYDROCHLORIDE 25 MG/1
12.5-25 SUPPOSITORY RECTAL EVERY 4 HOURS PRN
Status: DISCONTINUED | OUTPATIENT
Start: 2020-09-08 | End: 2020-09-12 | Stop reason: HOSPADM

## 2020-09-08 RX ORDER — DEXTROSE MONOHYDRATE 25 G/50ML
50 INJECTION, SOLUTION INTRAVENOUS
Status: DISCONTINUED | OUTPATIENT
Start: 2020-09-08 | End: 2020-09-12

## 2020-09-08 RX ADMIN — AMPICILLIN SODIUM AND SULBACTAM SODIUM 3 G: 2; 1 INJECTION, POWDER, FOR SOLUTION INTRAMUSCULAR; INTRAVENOUS at 22:27

## 2020-09-08 RX ADMIN — ASPIRIN 81 MG: 81 TABLET, COATED ORAL at 17:26

## 2020-09-08 RX ADMIN — SODIUM CHLORIDE 3 G: 900 INJECTION INTRAVENOUS at 15:16

## 2020-09-08 RX ADMIN — SODIUM CHLORIDE 1000 ML: 9 INJECTION, SOLUTION INTRAVENOUS at 15:16

## 2020-09-08 RX ADMIN — HEPARIN SODIUM 5000 UNITS: 5000 INJECTION, SOLUTION INTRAVENOUS; SUBCUTANEOUS at 17:24

## 2020-09-08 RX ADMIN — INSULIN GLARGINE 22 UNITS: 100 INJECTION, SOLUTION SUBCUTANEOUS at 17:30

## 2020-09-08 RX ADMIN — SODIUM CHLORIDE: 9 INJECTION, SOLUTION INTRAVENOUS at 17:16

## 2020-09-08 RX ADMIN — VANCOMYCIN HYDROCHLORIDE 2500 MG: 500 INJECTION, POWDER, LYOPHILIZED, FOR SOLUTION INTRAVENOUS at 17:29

## 2020-09-08 ASSESSMENT — ENCOUNTER SYMPTOMS
NERVOUS/ANXIOUS: 0
CARDIOVASCULAR NEGATIVE: 1
SEIZURES: 0
NAUSEA: 0
PALPITATIONS: 0
CONSTIPATION: 0
BLOOD IN STOOL: 0
EYES NEGATIVE: 1
GASTROINTESTINAL NEGATIVE: 1
MUSCULOSKELETAL NEGATIVE: 1
ABDOMINAL PAIN: 0
VOMITING: 0
FOCAL WEAKNESS: 0
DIZZINESS: 0
HEADACHES: 0
PSYCHIATRIC NEGATIVE: 1
CONSTITUTIONAL NEGATIVE: 1
DOUBLE VISION: 0
WHEEZING: 0
HEARTBURN: 0
NEUROLOGICAL NEGATIVE: 1
LOSS OF CONSCIOUSNESS: 0
DIAPHORESIS: 0
COUGH: 0
RESPIRATORY NEGATIVE: 1
BRUISES/BLEEDS EASILY: 0
DIARRHEA: 0
HEMOPTYSIS: 0
FEVER: 0
CHILLS: 0

## 2020-09-08 ASSESSMENT — PAIN DESCRIPTION - PAIN TYPE: TYPE: ACUTE PAIN

## 2020-09-08 ASSESSMENT — LIFESTYLE VARIABLES
TOTAL SCORE: 0
EVER FELT BAD OR GUILTY ABOUT YOUR DRINKING: NO
HAVE YOU EVER FELT YOU SHOULD CUT DOWN ON YOUR DRINKING: NO
EVER HAD A DRINK FIRST THING IN THE MORNING TO STEADY YOUR NERVES TO GET RID OF A HANGOVER: NO
ON A TYPICAL DAY WHEN YOU DRINK ALCOHOL HOW MANY DRINKS DO YOU HAVE: 0
AVERAGE NUMBER OF DAYS PER WEEK YOU HAVE A DRINK CONTAINING ALCOHOL: 0
HOW MANY TIMES IN THE PAST YEAR HAVE YOU HAD 5 OR MORE DRINKS IN A DAY: 0
TOTAL SCORE: 0
HAVE PEOPLE ANNOYED YOU BY CRITICIZING YOUR DRINKING: NO
ALCOHOL_USE: NO
TOTAL SCORE: 0
CONSUMPTION TOTAL: NEGATIVE

## 2020-09-08 ASSESSMENT — FIBROSIS 4 INDEX: FIB4 SCORE: 0.7

## 2020-09-08 ASSESSMENT — PATIENT HEALTH QUESTIONNAIRE - PHQ9
2. FEELING DOWN, DEPRESSED, IRRITABLE, OR HOPELESS: NOT AT ALL
1. LITTLE INTEREST OR PLEASURE IN DOING THINGS: NOT AT ALL
SUM OF ALL RESPONSES TO PHQ9 QUESTIONS 1 AND 2: 0

## 2020-09-08 NOTE — PROGRESS NOTES
2 RN skin assessment complete, skin not WDL. See Wound flowsheet for details.    Wound present to right 4th toe and 5th toe incision site. Open to air. Skin flaky and dark in appearance. No drainage noted at this time.    No other wounds noted.

## 2020-09-08 NOTE — ED NOTES
Med rec updated and complete  Allergies reviewed  Pt reports no vitamins   Pt reports he was on an antibiotic, pt is not sure the name.  Called Jonathan @ [phone] to verify antibiotic.

## 2020-09-08 NOTE — H&P
Hospital Medicine History & Physical Note    Date of Service  9/8/2020    Primary Care Physician  Thiago Kauffman D.O.    Consultants  Orthopedics Dr. Gross    Code Status  Full Code    Chief Complaint  Chief Complaint   Patient presents with   • Foot Pain     per pt sent by PCP for possible infection in Right Foot       History of Presenting Illness  54 y.o. male who presented 9/8/2020 with worsening redness of the amputation site which was done on 8/6/2020 for his fifth right toe.  At that time ABIs were done which shows severe ischemia of the blood supply both the fourth and the fifth toes.  He is not healing with these toes and the patient at this point will need continued antibiotic management after blood cultures were obtained, surgical reevaluation, MRI of the foot to make sure there is no osteomyelitis better control of his blood sugars as his hemoglobin A1c shows a very poor control of 10.4.    Review of Systems  Review of Systems   Constitutional: Negative.  Negative for chills, diaphoresis and fever.   HENT: Negative.    Eyes: Negative.  Negative for double vision.   Respiratory: Negative.  Negative for cough, hemoptysis and wheezing.    Cardiovascular: Negative.  Negative for chest pain, palpitations and leg swelling.   Gastrointestinal: Negative.  Negative for abdominal pain, blood in stool, constipation, diarrhea, heartburn, nausea and vomiting.   Genitourinary: Negative.  Negative for frequency, hematuria and urgency.   Musculoskeletal: Negative.  Negative for joint pain.   Skin: Negative for itching and rash.        Rash on the end of the fourth right toe as well as at the stump of the fifth toe where it was amputated.   Neurological: Negative.  Negative for dizziness, focal weakness, seizures, loss of consciousness and headaches.   Endo/Heme/Allergies: Negative.  Does not bruise/bleed easily.   Psychiatric/Behavioral: Negative.  Negative for suicidal ideas. The patient is not  nervous/anxious.    All other systems reviewed and are negative.      Past Medical History   has a past medical history of Diabetes (HCC) and Kidney anomaly, congenital.    Surgical History   has a past surgical history that includes appendectomy and toe amputation (Right, 8/6/2020).     Family History  family history includes Cancer in his mother; Lung Disease in his mother.     Social History   reports that he has quit smoking. He has never used smokeless tobacco. He reports previous alcohol use. He reports current drug use.    Allergies  No Known Allergies    Medications  Prior to Admission Medications   Prescriptions Last Dose Informant Patient Reported? Taking?   Insulin Lispro (HUMALOG KWIKPEN) 200 UNIT/ML Solution Pen-injector 9/7/2020 at 1200 Patient Yes Yes   Sig: Inject 1-6 Units as instructed 2 times a day, with meals. Sliding Scale 151- 200 = 1 unit  201- 250 = 2 units  251-300 = 3 units  301-350 = 4 units  351-400 = 5 units  Over 400 = 6 units   LANTUS SOLOSTAR 100 UNIT/ML Solution Pen-injector injection 9/7/2020 at 2000 Patient No No   Sig: Inject 22 Units as instructed every evening for 30 days.   aspirin EC 81 MG EC tablet 9/8/2020 at 0700 Patient No No   Sig: Take 1 Tab by mouth 2 Times a Day for 30 days.   cephALEXin (KEFLEX) 500 MG Cap 9/7/2020 at [facility] Patient's Home Pharmacy Yes Yes   Sig: Take 500 mg by mouth 2 times a day. Pt started on 9/1/2020 for 7 day course   lisinopril (PRINIVIL) 5 MG Tab 9/8/2020 at 0700 Patient No No   Sig: Take 1 Tab by mouth every day for 30 days.      Facility-Administered Medications: None       Physical Exam  Temp:  [36.5 °C (97.7 °F)] 36.5 °C (97.7 °F)  Pulse:  [102-114] 102  Resp:  [17] 17  BP: (126-128)/(83-85) 126/83  SpO2:  [96 %-100 %] 100 %    Physical Exam  Vitals signs and nursing note reviewed. Exam conducted with a chaperone present.   Constitutional:       Appearance: He is well-developed. He is obese. He is not ill-appearing or diaphoretic.   HENT:       Head: Normocephalic and atraumatic.      Right Ear: External ear normal.      Left Ear: External ear normal.      Nose: Nose normal.      Mouth/Throat:      Mouth: Mucous membranes are moist.      Pharynx: Oropharynx is clear.   Eyes:      Extraocular Movements: Extraocular movements intact.      Conjunctiva/sclera: Conjunctivae normal.      Pupils: Pupils are equal, round, and reactive to light.   Neck:      Musculoskeletal: Normal range of motion and neck supple.      Thyroid: No thyromegaly.      Vascular: No JVD.   Cardiovascular:      Rate and Rhythm: Normal rate and regular rhythm.      Pulses: Normal pulses.      Heart sounds: Normal heart sounds.   Pulmonary:      Effort: Pulmonary effort is normal.      Breath sounds: Normal breath sounds.   Chest:      Chest wall: No tenderness.   Abdominal:      General: Abdomen is flat. Bowel sounds are normal. There is no distension.      Palpations: Abdomen is soft. There is no mass.      Tenderness: There is no abdominal tenderness. There is no guarding or rebound.   Musculoskeletal: Normal range of motion.   Lymphadenopathy:      Cervical: No cervical adenopathy.   Skin:     General: Skin is warm and dry.      Capillary Refill: Capillary refill takes 2 to 3 seconds.      Findings: No rash.          Neurological:      General: No focal deficit present.      Mental Status: He is alert and oriented to person, place, and time. Mental status is at baseline.      Cranial Nerves: No cranial nerve deficit.      Deep Tendon Reflexes: Reflexes are normal and symmetric.   Psychiatric:         Mood and Affect: Mood normal.         Behavior: Behavior normal.         Thought Content: Thought content normal.         Judgment: Judgment normal.         Laboratory:  Recent Labs     09/08/20  1422   WBC 13.6*   RBC 5.25   HEMOGLOBIN 16.0   HEMATOCRIT 46.2   MCV 88.0   MCH 30.5   MCHC 34.6   RDW 41.9   PLATELETCT 304   MPV 10.1     Recent Labs     09/08/20  1422   SODIUM 139    POTASSIUM 4.2   CHLORIDE 104   CO2 23   GLUCOSE 152*   BUN 18   CREATININE 1.04   CALCIUM 9.3     Recent Labs     09/08/20  1422   ALTSGPT 21   ASTSGOT 18   ALKPHOSPHAT 64   TBILIRUBIN 0.2   GLUCOSE 152*         No results for input(s): NTPROBNP in the last 72 hours.      No results for input(s): TROPONINT in the last 72 hours.    Imaging:  US-EXTREMITY ARTERY LOWER UNILAT W/AGUSTINA (COMBO) RIGHT    (Results Pending)         Assessment/Plan:  I anticipate this patient will require at least two midnights for appropriate medical management, necessitating inpatient admission.    * Diabetic foot infection (HCC)- (present on admission)  Assessment & Plan  On 8/6/2020 patient had his fifth right toe amputated due to diabetic infection.  Patient today comes back because of not healing and also worsening redness and an eschar on the fourth toe.  In looking at the ABIs the patient has flattened PPG waveforms for the fourth and fifth toe of the right foot indicating severe ischemia.  At that time a CT scan on 8/1/2020 was done which showed no osteomyelitis.  Patient has been compliant with his outpatient antibiotics in the form of Keflex.  At this point he will be switched over to IV antibiotics in the combination of Unasyn and vancomycin.  Patient will have blood cultures obtained prior to any antibiotics.  Orthopedics recommends obtaining an MRI of the foot.    Leukocytosis  Assessment & Plan  Leukocytosis at 13.6 indicating mild infection.  We will continue to monitor white blood cell count we will also obtain a CRP level and repeat ABIs as well as arterial ultrasound.    Essential hypertension- (present on admission)  Assessment & Plan  Optimize blood pressure management keep systolic blood pressure less than 140 diastolic under 90.    Type 2 diabetes mellitus with hyperglycemia, without long-term current use of insulin (HCC)- (present on admission)  Assessment & Plan  -accus with sliding scale coverage  -diabetic  diet  -diabetic education  -follow glycohemoglobin levels long term, most recent hemoglobin A1c is 10.4.  This is actually showing very poor control.  -continue with oral antihyperglycemics  -monitor for hypoglycemic episodes and adjust control if he should get low    Class 1 obesity due to excess calories without serious comorbidity with body mass index (BMI) of 33.0 to 33.9 in adult- (present on admission)  Assessment & Plan  Body mass index is 33.11 kg/m².  Outpatient weight loss management program encouraged

## 2020-09-08 NOTE — ASSESSMENT & PLAN NOTE
On 8/6/2020 patient had his fifth right toe amputated due to diabetic infection and ischemic emboli to foot (no source was identified).  He was discharged on ASA and abx as well as insulin/lantus.  Redness improved but returned when antibiotics were discontinued therefore outpatient antibiotics were represcribed.  Despite this, redness worsened again and he developed a dark eschar over his surgical site  Sent here by ortho  MRI shows osteo of 4th and 5th MT head s/p debridement on 9/9/20  Repeat AGUSTINA shows no e/o plaque or ischemia  Discussed with ID - 6 weeks IV vs PO abx pending final cultures.  Wound cx growing MSSA +viridens strep (previous w MSSA plus Acinetobacter)  Hold off on PICC in case PO is an option for outpatient treatment  Continue Unasyn (MSSA plus anaerobes) + Bactrim (acinetobacter)

## 2020-09-08 NOTE — ED TRIAGE NOTES
"Chief Complaint   Patient presents with   • Foot Pain     per pt sent by PCP for possible infection in Right Foot     /85   Pulse (!) 114   Temp 36.5 °C (97.7 °F) (Temporal)   Resp 17   Ht 1.702 m (5' 7\")   Wt 95.9 kg (211 lb 6.7 oz)   SpO2 96%   BMI 33.11 kg/m²     Covid Screen Negative.    "

## 2020-09-08 NOTE — ASSESSMENT & PLAN NOTE
Optimize blood pressure management keep systolic blood pressure less than 140 diastolic under 90.  Continue lisinopril

## 2020-09-08 NOTE — ED PROVIDER NOTES
ED Provider Note    CHIEF COMPLAINT  Chief Complaint   Patient presents with   • Foot Pain     per pt sent by PCP for possible infection in Right Foot        HPI  Mc Eugene is a 54 y.o. male who presents the ED with complaints of right foot possible infection.  The patient status post amputation of that right fifth toe by Dr. Gutierrez on 8/6/2020.  Is been doing well then it started apparently getting a little bit more red was seen by Dr. Gutierrez and started on some antibiotics.  Apparently the area is not improving and actually slightly worsening according to the patient.  He was seen by Dr. Gutierrez today and is recommended to come to the emergency department for further evaluation and care.  Upon arrival he just describes some mildly increasing pain to the foot he does have peripheral neuropathy denies any fevers chills nausea vomiting chest pain abdominal pain or any other symptoms.    REVIEW OF SYSTEMS  See HPI for further details. All other systems are negative.     PAST MEDICAL HISTORY  Past Medical History:   Diagnosis Date   • Diabetes (HCC)    • Kidney anomaly, congenital     born with 1 kidney       FAMILY HISTORY  Family History   Problem Relation Age of Onset   • Lung Disease Mother    • Cancer Mother        SOCIAL HISTORY  Social History     Socioeconomic History   • Marital status:      Spouse name: Not on file   • Number of children: Not on file   • Years of education: Not on file   • Highest education level: Not on file   Occupational History   • Not on file   Social Needs   • Financial resource strain: Not on file   • Food insecurity     Worry: Not on file     Inability: Not on file   • Transportation needs     Medical: Not on file     Non-medical: Not on file   Tobacco Use   • Smoking status: Former Smoker   • Smokeless tobacco: Never Used   Substance and Sexual Activity   • Alcohol use: Not Currently   • Drug use: Yes     Comment: marijuana vape   • Sexual activity: Not on  "file   Lifestyle   • Physical activity     Days per week: Not on file     Minutes per session: Not on file   • Stress: Not on file   Relationships   • Social connections     Talks on phone: Not on file     Gets together: Not on file     Attends [employer] service: Not on file     Active member of club or organization: Not on file     Attends meetings of clubs or organizations: Not on file     Relationship status: Not on file   • Intimate partner violence     Fear of current or ex partner: Not on file     Emotionally abused: Not on file     Physically abused: Not on file     Forced sexual activity: Not on file   Other Topics Concern   • Not on file   Social History Narrative   • Not on file      Thiago Kauffman D.O.      SURGICAL HISTORY  Past Surgical History:   Procedure Laterality Date   • TOE AMPUTATION Right 8/6/2020    Procedure: AMPUTATION, TOE - 5th TOE;  Surgeon: Liban Mallory M.D.;  Location: SURGERY [facility];  Service: Orthopedics   • APPENDECTOMY         CURRENT MEDICATIONS  Home Medications     Reviewed by Hector Lewis M.D. (Physician) on 09/08/20 at 1503  Med List Status: Complete   Medication Last Dose Status   aspirin EC 81 MG EC tablet 9/8/2020 Active   cephALEXin (KEFLEX) 500 MG Cap 9/7/2020 Active   Insulin Lispro (HUMALOG KWIKPEN) 200 UNIT/ML Solution Pen-injector 9/7/2020 Active   LANTUS SOLOSTAR 100 UNIT/ML Solution Pen-injector injection 9/7/2020 Active   lisinopril (PRINIVIL) 5 MG Tab 9/8/2020 Active                ALLERGIES  No Known Allergies    PHYSICAL EXAM  VITAL SIGNS: /81   Pulse 97   Temp 36.4 °C (97.6 °F) (Oral)   Resp 18   Ht 1.702 m (5' 7\")   Wt 95.9 kg (211 lb 6.7 oz)   SpO2 95%   BMI 33.11 kg/m²    Pulse Oximetry was obtained. It showed a reading of Pulse Oximetry: 96 %.  I interpreted this as non-hypoxic.     Constitutional: Well developed, Well nourished, No acute distress, Non-toxic appearance.   HENT: Normocephalic, Atraumatic, Bilateral " external ears normal, bilateral tympanic membranes normal, Oropharynx mucous membranes, No oral exudates, Nose normal.   Eyes:  conjunctiva is normal, there are no signs of exudate.   Neck: Supple, no cervical lymphadenopathy, no meningeal signs..   Lymphatic: No lymphadenopathy noted.   Cardiovascular: Regular rate and rhythm without murmurs gallops or rubs.   Thorax & Lungs: Lungs are clear to auscultation bilaterally, there are no wheezes no rales. Chest wall is nontender.  Abdomen: Soft, nontender nondistended. Bowel sounds are present.   Skin: Warm, Dry, No erythema,   Back: No tenderness, No CVA tenderness.  Musculoskeletal: Right foot there pictures below.  The right toes amputated there is an eschar there is an amount of erythema that surrounds the fourth toe as well as the wound itself.  On the volar aspect there is an eschar on the tip of the fourth toe and is erythematous as well.              Neurologic: Alert & oriented x 3, Normal motor function, Normal sensory function, No focal deficits noted.   Psychiatric: Affect normal, Judgment normal, Mood normal.         RADIOLOGY/PROCEDURES  MR-FOOT-W/O RIGHT    (Results Pending)   US-AGUSTINA SINGLE LEVEL BILAT    (Results Pending)       Results for orders placed or performed during the hospital encounter of 09/08/20   CBC WITH DIFFERENTIAL   Result Value Ref Range    WBC 13.6 (H) 4.8 - 10.8 K/uL    RBC 5.25 4.70 - 6.10 M/uL    Hemoglobin 16.0 14.0 - 18.0 g/dL    Hematocrit 46.2 42.0 - 52.0 %    MCV 88.0 81.4 - 97.8 fL    MCH 30.5 27.0 - 33.0 pg    MCHC 34.6 33.7 - 35.3 g/dL    RDW 41.9 35.9 - 50.0 fL    Platelet Count 304 164 - 446 K/uL    MPV 10.1 9.0 - 12.9 fL    Neutrophils-Polys 67.80 44.00 - 72.00 %    Lymphocytes 22.50 22.00 - 41.00 %    Monocytes 6.30 0.00 - 13.40 %    Eosinophils 2.10 0.00 - 6.90 %    Basophils 0.90 0.00 - 1.80 %    Immature Granulocytes 0.40 0.00 - 0.90 %    Nucleated RBC 0.00 /100 WBC    Neutrophils (Absolute) 9.20 (H) 1.82 - 7.42 K/uL     Lymphs (Absolute) 3.06 1.00 - 4.80 K/uL    Monos (Absolute) 0.85 0.00 - 0.85 K/uL    Eos (Absolute) 0.29 0.00 - 0.51 K/uL    Baso (Absolute) 0.12 0.00 - 0.12 K/uL    Immature Granulocytes (abs) 0.05 0.00 - 0.11 K/uL    NRBC (Absolute) 0.00 K/uL   COMP METABOLIC PANEL   Result Value Ref Range    Sodium 139 135 - 145 mmol/L    Potassium 4.2 3.6 - 5.5 mmol/L    Chloride 104 96 - 112 mmol/L    Co2 23 20 - 33 mmol/L    Anion Gap 12.0 7.0 - 16.0    Glucose 152 (H) 65 - 99 mg/dL    Bun 18 8 - 22 mg/dL    Creatinine 1.04 0.50 - 1.40 mg/dL    Calcium 9.3 8.4 - 10.2 mg/dL    AST(SGOT) 18 12 - 45 U/L    ALT(SGPT) 21 2 - 50 U/L    Alkaline Phosphatase 64 30 - 99 U/L    Total Bilirubin 0.2 0.1 - 1.5 mg/dL    Albumin 3.9 3.2 - 4.9 g/dL    Total Protein 7.3 6.0 - 8.2 g/dL    Globulin 3.4 1.9 - 3.5 g/dL    A-G Ratio 1.1 g/dL   CRP Quantitive   Result Value Ref Range    Stat C-Reactive Protein 0.71 0.00 - 0.75 mg/dL   ESTIMATED GFR   Result Value Ref Range    GFR If African American >60 >60 mL/min/1.73 m 2    GFR If Non African American >60 >60 mL/min/1.73 m 2         COURSE & MEDICAL DECISION MAKING  Pertinent Labs & Imaging studies reviewed. (See chart for details)  Patient presents the ED for evaluation.  Clinically the patient does have some mild erythema to the lateral aspect of the foot as described in the pictures as well as the physical exam as above.  Patient did have ABIs done prior to this and has very minimal blood flow to the area could very well be ischemic changes as well.  I have spoken with Dr. Gutierrez he requested to start the patient on antibiotics and and obtain an MRI.  I have spoken to the hospitalist to admit this patient.    FINAL IMPRESSION  1. Diabetic foot infection (HCC)                 Electronically signed by: Quang Umaña M.D., 9/8/2020 2:29 PM

## 2020-09-08 NOTE — ASSESSMENT & PLAN NOTE
Repeat A1C improved 8.5 from 10.5.  He had blood sugar less than 100 last night therefore Lantus was held again (2nd night in a row).   Hold lantus  Decrease SSI  Goal sugar is <150 to facilitate healing

## 2020-09-08 NOTE — PROGRESS NOTES
Pt arrived to floor from ER. Assumed care of patient. Discussed daily plan of care, oriented patient to floor. VSS. Pt awake and alert, denies complaints or concerns at this time. Hourly rounding in place. Ultrasound tech at bedside.

## 2020-09-08 NOTE — PROGRESS NOTES
"Pharmacy Kinetics 54 y.o. male on vancomycin day # 1 2020    Currently on Vancomycin 2500 mg iv LD    Indication for Treatment: Diabetic foot infection    Pertinent history per medical record: Admitted on 2020 for worsening redness of amputation site done on 20. AGUSTINA shows severe ischemia of the 4th and 5th toes. MRI of foot ordered to rule out osteomyelitis    Other antibiotics: Unasyn 3 gm Q6H    Allergies: Patient has no known allergies.     List concerns for renal function: Obesity (BMI 33.11)    Pertinent cultures to date:    Bcx2 - In process    Recent Labs     20  1422   WBC 13.6*   NEUTSPOLYS 67.80     Recent Labs     20  1422   BUN 18   CREATININE 1.04   ALBUMIN 3.9     No results for input(s): VANCOTROUGH, VANCOPEAK, VANCORANDOM in the last 72 hours.No intake or output data in the 24 hours ending 20 1610   /83   Pulse (!) 102   Temp 36.5 °C (97.7 °F) (Temporal)   Resp 17   Ht 1.702 m (5' 7\")   Wt 95.9 kg (211 lb 6.7 oz)   SpO2 100%  Temp (24hrs), Av.5 °C (97.7 °F), Min:36.5 °C (97.7 °F), Max:36.5 °C (97.7 °F)      A/P   1. Vancomycin dose change: 1500 mg Q12H (0330, 1530)  2. Next vancomycin level:  @ 1500  3. Goal trough: 10-15 mcg/mL  4. Comments: Will monitor and adjust per protocol     Christianne Franz PharmD Candidate     [facility]      "

## 2020-09-09 ENCOUNTER — ANESTHESIA (OUTPATIENT)
Dept: SURGERY | Facility: MEDICAL CENTER | Age: 55
DRG: 629 | End: 2020-09-09
Payer: COMMERCIAL

## 2020-09-09 ENCOUNTER — ANESTHESIA EVENT (OUTPATIENT)
Dept: SURGERY | Facility: MEDICAL CENTER | Age: 55
DRG: 629 | End: 2020-09-09
Payer: COMMERCIAL

## 2020-09-09 LAB
ANION GAP SERPL CALC-SCNC: 13 MMOL/L (ref 7–16)
BUN SERPL-MCNC: 14 MG/DL (ref 8–22)
CALCIUM SERPL-MCNC: 8.9 MG/DL (ref 8.4–10.2)
CHLORIDE SERPL-SCNC: 105 MMOL/L (ref 96–112)
CHOLEST SERPL-MCNC: 208 MG/DL (ref 100–199)
CO2 SERPL-SCNC: 22 MMOL/L (ref 20–33)
COVID ORDER STATUS COVID19: NORMAL
CREAT SERPL-MCNC: 0.79 MG/DL (ref 0.5–1.4)
ERYTHROCYTE [DISTWIDTH] IN BLOOD BY AUTOMATED COUNT: 42.5 FL (ref 35.9–50)
GLUCOSE BLD-MCNC: 104 MG/DL (ref 65–99)
GLUCOSE BLD-MCNC: 128 MG/DL (ref 65–99)
GLUCOSE BLD-MCNC: 75 MG/DL (ref 65–99)
GLUCOSE BLD-MCNC: 88 MG/DL (ref 65–99)
GLUCOSE SERPL-MCNC: 81 MG/DL (ref 65–99)
GRAM STN SPEC: NORMAL
HCT VFR BLD AUTO: 47.7 % (ref 42–52)
HDLC SERPL-MCNC: 30 MG/DL
HGB BLD-MCNC: 15.8 G/DL (ref 14–18)
LDLC SERPL CALC-MCNC: 133 MG/DL
MCH RBC QN AUTO: 30.2 PG (ref 27–33)
MCHC RBC AUTO-ENTMCNC: 33.1 G/DL (ref 33.7–35.3)
MCV RBC AUTO: 91 FL (ref 81.4–97.8)
PLATELET # BLD AUTO: 248 K/UL (ref 164–446)
PMV BLD AUTO: 9.9 FL (ref 9–12.9)
POTASSIUM SERPL-SCNC: 4.1 MMOL/L (ref 3.6–5.5)
RBC # BLD AUTO: 5.24 M/UL (ref 4.7–6.1)
SARS-COV-2 RNA RESP QL NAA+PROBE: NOTDETECTED
SIGNIFICANT IND 70042: NORMAL
SITE SITE: NORMAL
SODIUM SERPL-SCNC: 140 MMOL/L (ref 135–145)
SOURCE SOURCE: NORMAL
SPECIMEN SOURCE: NORMAL
TRIGL SERPL-MCNC: 224 MG/DL (ref 0–149)
VANCOMYCIN TROUGH SERPL-MCNC: 18.8 UG/ML (ref 10–20)
WBC # BLD AUTO: 10 K/UL (ref 4.8–10.8)

## 2020-09-09 PROCEDURE — 80061 LIPID PANEL: CPT

## 2020-09-09 PROCEDURE — 82962 GLUCOSE BLOOD TEST: CPT | Mod: 91

## 2020-09-09 PROCEDURE — 87205 SMEAR GRAM STAIN: CPT

## 2020-09-09 PROCEDURE — 87015 SPECIMEN INFECT AGNT CONCNTJ: CPT

## 2020-09-09 PROCEDURE — 36415 COLL VENOUS BLD VENIPUNCTURE: CPT

## 2020-09-09 PROCEDURE — 700102 HCHG RX REV CODE 250 W/ 637 OVERRIDE(OP): Performed by: ORTHOPAEDIC SURGERY

## 2020-09-09 PROCEDURE — 700105 HCHG RX REV CODE 258: Performed by: ORTHOPAEDIC SURGERY

## 2020-09-09 PROCEDURE — 99233 SBSQ HOSP IP/OBS HIGH 50: CPT | Performed by: INTERNAL MEDICINE

## 2020-09-09 PROCEDURE — 0QBN0ZZ EXCISION OF RIGHT METATARSAL, OPEN APPROACH: ICD-10-PCS | Performed by: ORTHOPAEDIC SURGERY

## 2020-09-09 PROCEDURE — 160035 HCHG PACU - 1ST 60 MINS PHASE I: Performed by: ORTHOPAEDIC SURGERY

## 2020-09-09 PROCEDURE — 700101 HCHG RX REV CODE 250: Performed by: ORTHOPAEDIC SURGERY

## 2020-09-09 PROCEDURE — 160002 HCHG RECOVERY MINUTES (STAT): Performed by: ORTHOPAEDIC SURGERY

## 2020-09-09 PROCEDURE — 160036 HCHG PACU - EA ADDL 30 MINS PHASE I: Performed by: ORTHOPAEDIC SURGERY

## 2020-09-09 PROCEDURE — 87075 CULTR BACTERIA EXCEPT BLOOD: CPT

## 2020-09-09 PROCEDURE — 87070 CULTURE OTHR SPECIMN AEROBIC: CPT

## 2020-09-09 PROCEDURE — 87147 CULTURE TYPE IMMUNOLOGIC: CPT

## 2020-09-09 PROCEDURE — 700105 HCHG RX REV CODE 258: Performed by: HOSPITALIST

## 2020-09-09 PROCEDURE — 700111 HCHG RX REV CODE 636 W/ 250 OVERRIDE (IP): Performed by: HOSPITALIST

## 2020-09-09 PROCEDURE — 160027 HCHG SURGERY MINUTES - 1ST 30 MINS LEVEL 2: Performed by: ORTHOPAEDIC SURGERY

## 2020-09-09 PROCEDURE — 770006 HCHG ROOM/CARE - MED/SURG/GYN SEMI*

## 2020-09-09 PROCEDURE — A9270 NON-COVERED ITEM OR SERVICE: HCPCS | Performed by: ORTHOPAEDIC SURGERY

## 2020-09-09 PROCEDURE — 160009 HCHG ANES TIME/MIN: Performed by: ORTHOPAEDIC SURGERY

## 2020-09-09 PROCEDURE — A9270 NON-COVERED ITEM OR SERVICE: HCPCS | Performed by: HOSPITALIST

## 2020-09-09 PROCEDURE — 700102 HCHG RX REV CODE 250 W/ 637 OVERRIDE(OP): Performed by: HOSPITALIST

## 2020-09-09 PROCEDURE — 160038 HCHG SURGERY MINUTES - EA ADDL 1 MIN LEVEL 2: Performed by: ORTHOPAEDIC SURGERY

## 2020-09-09 PROCEDURE — 80048 BASIC METABOLIC PNL TOTAL CA: CPT

## 2020-09-09 PROCEDURE — 85027 COMPLETE CBC AUTOMATED: CPT

## 2020-09-09 PROCEDURE — 501838 HCHG SUTURE GENERAL: Performed by: ORTHOPAEDIC SURGERY

## 2020-09-09 PROCEDURE — 700111 HCHG RX REV CODE 636 W/ 250 OVERRIDE (IP): Performed by: ANESTHESIOLOGY

## 2020-09-09 PROCEDURE — 160048 HCHG OR STATISTICAL LEVEL 1-5: Performed by: ORTHOPAEDIC SURGERY

## 2020-09-09 PROCEDURE — 80202 ASSAY OF VANCOMYCIN: CPT

## 2020-09-09 PROCEDURE — 500881 HCHG PACK, EXTREMITY: Performed by: ORTHOPAEDIC SURGERY

## 2020-09-09 RX ORDER — MEPERIDINE HYDROCHLORIDE 25 MG/ML
12.5 INJECTION INTRAMUSCULAR; INTRAVENOUS; SUBCUTANEOUS
Status: DISCONTINUED | OUTPATIENT
Start: 2020-09-09 | End: 2020-09-09 | Stop reason: HOSPADM

## 2020-09-09 RX ORDER — BUPIVACAINE HYDROCHLORIDE 5 MG/ML
INJECTION, SOLUTION EPIDURAL; INTRACAUDAL
Status: DISCONTINUED | OUTPATIENT
Start: 2020-09-09 | End: 2020-09-09 | Stop reason: HOSPADM

## 2020-09-09 RX ORDER — HALOPERIDOL 5 MG/ML
1 INJECTION INTRAMUSCULAR
Status: DISCONTINUED | OUTPATIENT
Start: 2020-09-09 | End: 2020-09-09 | Stop reason: HOSPADM

## 2020-09-09 RX ORDER — SODIUM CHLORIDE, SODIUM LACTATE, POTASSIUM CHLORIDE, CALCIUM CHLORIDE 600; 310; 30; 20 MG/100ML; MG/100ML; MG/100ML; MG/100ML
INJECTION, SOLUTION INTRAVENOUS CONTINUOUS
Status: ACTIVE | OUTPATIENT
Start: 2020-09-09 | End: 2020-09-10

## 2020-09-09 RX ORDER — ONDANSETRON 2 MG/ML
4 INJECTION INTRAMUSCULAR; INTRAVENOUS
Status: DISCONTINUED | OUTPATIENT
Start: 2020-09-09 | End: 2020-09-09 | Stop reason: HOSPADM

## 2020-09-09 RX ORDER — SODIUM CHLORIDE, SODIUM LACTATE, POTASSIUM CHLORIDE, CALCIUM CHLORIDE 600; 310; 30; 20 MG/100ML; MG/100ML; MG/100ML; MG/100ML
INJECTION, SOLUTION INTRAVENOUS CONTINUOUS
Status: DISCONTINUED | OUTPATIENT
Start: 2020-09-09 | End: 2020-09-09 | Stop reason: HOSPADM

## 2020-09-09 RX ORDER — HYDROMORPHONE HYDROCHLORIDE 1 MG/ML
0.4 INJECTION, SOLUTION INTRAMUSCULAR; INTRAVENOUS; SUBCUTANEOUS
Status: DISCONTINUED | OUTPATIENT
Start: 2020-09-09 | End: 2020-09-09 | Stop reason: HOSPADM

## 2020-09-09 RX ORDER — HYDROMORPHONE HYDROCHLORIDE 1 MG/ML
0.1 INJECTION, SOLUTION INTRAMUSCULAR; INTRAVENOUS; SUBCUTANEOUS
Status: DISCONTINUED | OUTPATIENT
Start: 2020-09-09 | End: 2020-09-09 | Stop reason: HOSPADM

## 2020-09-09 RX ORDER — HYDROMORPHONE HYDROCHLORIDE 1 MG/ML
0.2 INJECTION, SOLUTION INTRAMUSCULAR; INTRAVENOUS; SUBCUTANEOUS
Status: DISCONTINUED | OUTPATIENT
Start: 2020-09-09 | End: 2020-09-09 | Stop reason: HOSPADM

## 2020-09-09 RX ORDER — OXYCODONE HCL 5 MG/5 ML
10 SOLUTION, ORAL ORAL
Status: DISCONTINUED | OUTPATIENT
Start: 2020-09-09 | End: 2020-09-09 | Stop reason: HOSPADM

## 2020-09-09 RX ORDER — OXYCODONE HCL 5 MG/5 ML
5 SOLUTION, ORAL ORAL
Status: DISCONTINUED | OUTPATIENT
Start: 2020-09-09 | End: 2020-09-09 | Stop reason: HOSPADM

## 2020-09-09 RX ADMIN — AMPICILLIN SODIUM AND SULBACTAM SODIUM 3 G: 2; 1 INJECTION, POWDER, FOR SOLUTION INTRAMUSCULAR; INTRAVENOUS at 18:32

## 2020-09-09 RX ADMIN — ASPIRIN 81 MG: 81 TABLET, COATED ORAL at 18:32

## 2020-09-09 RX ADMIN — ACETAMINOPHEN 650 MG: 325 TABLET, FILM COATED ORAL at 21:28

## 2020-09-09 RX ADMIN — AMPICILLIN SODIUM AND SULBACTAM SODIUM 3 G: 2; 1 INJECTION, POWDER, FOR SOLUTION INTRAMUSCULAR; INTRAVENOUS at 12:10

## 2020-09-09 RX ADMIN — LISINOPRIL 5 MG: 5 TABLET ORAL at 05:19

## 2020-09-09 RX ADMIN — VANCOMYCIN HYDROCHLORIDE 1500 MG: 500 INJECTION, POWDER, LYOPHILIZED, FOR SOLUTION INTRAVENOUS at 06:17

## 2020-09-09 RX ADMIN — AMPICILLIN SODIUM AND SULBACTAM SODIUM 3 G: 2; 1 INJECTION, POWDER, FOR SOLUTION INTRAMUSCULAR; INTRAVENOUS at 23:12

## 2020-09-09 RX ADMIN — PROPOFOL 200 MG: 10 INJECTION, EMULSION INTRAVENOUS at 15:39

## 2020-09-09 RX ADMIN — SODIUM CHLORIDE, POTASSIUM CHLORIDE, SODIUM LACTATE AND CALCIUM CHLORIDE: 600; 310; 30; 20 INJECTION, SOLUTION INTRAVENOUS at 15:32

## 2020-09-09 RX ADMIN — AMPICILLIN SODIUM AND SULBACTAM SODIUM 3 G: 2; 1 INJECTION, POWDER, FOR SOLUTION INTRAMUSCULAR; INTRAVENOUS at 05:19

## 2020-09-09 RX ADMIN — INSULIN GLARGINE 22 UNITS: 100 INJECTION, SOLUTION SUBCUTANEOUS at 18:31

## 2020-09-09 RX ADMIN — SODIUM CHLORIDE: 9 INJECTION, SOLUTION INTRAVENOUS at 03:15

## 2020-09-09 RX ADMIN — ASPIRIN 81 MG: 81 TABLET, COATED ORAL at 05:19

## 2020-09-09 RX ADMIN — POVIDONE-IODINE 15 ML: 10 SOLUTION TOPICAL at 15:32

## 2020-09-09 ASSESSMENT — ENCOUNTER SYMPTOMS
BLOOD IN STOOL: 0
COUGH: 0
PALPITATIONS: 0
ABDOMINAL PAIN: 0
FOCAL WEAKNESS: 0
VOMITING: 0
MYALGIAS: 0
SORE THROAT: 0
BACK PAIN: 0
DIZZINESS: 0
HEARTBURN: 0
HALLUCINATIONS: 0
HEADACHES: 0
SHORTNESS OF BREATH: 0
WEAKNESS: 0
NAUSEA: 0
DIARRHEA: 0
DEPRESSION: 0
CHILLS: 0
FEVER: 0

## 2020-09-09 ASSESSMENT — PAIN DESCRIPTION - PAIN TYPE: TYPE: SURGICAL PAIN

## 2020-09-09 NOTE — ANESTHESIA TIME REPORT
Anesthesia Start and Stop Event Times     Date Time Event    9/9/2020 1517 Ready for Procedure     1536 Anesthesia Start     1615 Anesthesia Stop        Responsible Staff  09/09/20    Name Role Begin End    Tobey Gansert, M.D. Anesth 1536 1615        Preop Diagnosis (Free Text):  Pre-op Diagnosis     Right necrotic 5th toe        Preop Diagnosis (Codes):    Post op Diagnosis  Necrotic toes (HCC)      Premium Reason  A. 3PM - 7AM    Comments:

## 2020-09-09 NOTE — PROGRESS NOTES
Hospital Medicine Daily Progress Note    Date of Service  9/9/2020    Chief Complaint  54 y.o. male admitted 9/8/2020 with right toe pain and worsening wound.      Hospital Course    Hx of DM recently started on insulin, recent ishcemic toe injury c/b necrosis/osteo requiring amputation of 5th toe admitted for worsening pain and redness at the surgical wound site despite starting OP abx.       Interval Problem Update  9/9: MRI shows osteo of 4th and 5th MT head.  Planning for I&D.  I discussed with [facility].    Consultants/Specialty  Dr. Mallory - Ortho  Pending ID consult    Code Status  Full Code    Disposition  Home w IV abx.    Review of Systems  Review of Systems   Constitutional: Negative for chills, fever and malaise/fatigue.   HENT: Negative for sore throat.    Respiratory: Negative for cough and shortness of breath.    Cardiovascular: Negative for chest pain and palpitations.   Gastrointestinal: Negative for abdominal pain, blood in stool, diarrhea, heartburn, nausea and vomiting.   Genitourinary: Negative for dysuria and frequency.   Musculoskeletal: Negative for back pain and myalgias.   Skin: Positive for rash.        Redness and pain near 4th and 5th toes   Neurological: Negative for dizziness, focal weakness, weakness and headaches.   Psychiatric/Behavioral: Negative for depression and hallucinations.   All other systems reviewed and are negative.       Physical Exam  Temp:  [36.4 °C (97.6 °F)-36.6 °C (97.9 °F)] 36.6 °C (97.9 °F)  Pulse:  [] 92  Resp:  [17-18] 18  BP: (126-155)/(81-94) 151/94  SpO2:  [93 %-100 %] 94 %    Physical Exam  Constitutional:       Appearance: Normal appearance.   HENT:      Head: Normocephalic and atraumatic.      Nose: Nose normal.      Mouth/Throat:      Mouth: Mucous membranes are moist.   Eyes:      Extraocular Movements: Extraocular movements intact.      Pupils: Pupils are equal, round, and reactive to light.   Neck:      Musculoskeletal: Normal range of motion  and neck supple.   Cardiovascular:      Rate and Rhythm: Normal rate and regular rhythm.      Heart sounds: No murmur.   Pulmonary:      Effort: Pulmonary effort is normal. No respiratory distress.      Breath sounds: Normal breath sounds. No stridor.   Abdominal:      General: Abdomen is flat. Bowel sounds are normal. There is no distension.      Palpations: Abdomen is soft.      Tenderness: There is no abdominal tenderness.   Musculoskeletal:         General: Swelling (Minimal swelling of dorsal R foot, near base of 4th and 5th toe.) present. No tenderness or deformity.   Skin:     General: Skin is warm and dry.      Coloration: Skin is not pale.      Findings: Erythema present.      Comments: Eshcar at surgical incision site over base of 5th MT, also distal part of 4th toe with black eshcar.   Neurological:      General: No focal deficit present.      Mental Status: He is alert and oriented to person, place, and time. Mental status is at baseline.   Psychiatric:         Mood and Affect: Mood normal.         Behavior: Behavior normal.         Thought Content: Thought content normal.         Fluids    Intake/Output Summary (Last 24 hours) at 9/9/2020 1154  Last data filed at 9/9/2020 0600  Gross per 24 hour   Intake 1256.87 ml   Output --   Net 1256.87 ml       Laboratory  Recent Labs     09/08/20  1422 09/09/20  0331   WBC 13.6* 10.0   RBC 5.25 5.24   HEMOGLOBIN 16.0 15.8   HEMATOCRIT 46.2 47.7   MCV 88.0 91.0   MCH 30.5 30.2   MCHC 34.6 33.1*   RDW 41.9 42.5   PLATELETCT 304 248   MPV 10.1 9.9     Recent Labs     09/08/20  1422 09/09/20  0331   SODIUM 139 140   POTASSIUM 4.2 4.1   CHLORIDE 104 105   CO2 23 22   GLUCOSE 152* 81   BUN 18 14   CREATININE 1.04 0.79   CALCIUM 9.3 8.9             Recent Labs     09/09/20  0331   TRIGLYCERIDE 224*   HDL 30*   *       Imaging  MR-FOOT-W/O RIGHT   Final Result      1.  Prior 5th digit amputation      2.  Osteomyelitis of the 5th and 4th metatarsal heads      US-AGUSTINA  SINGLE LEVEL BILAT   Final Result           Assessment/Plan  * Diabetic foot infection (HCC)- (present on admission)  Assessment & Plan  On 8/6/2020 patient had his fifth right toe amputated due to diabetic infection and ischemic emboli to foot (no source was identified).  He was discharged on ASA and abx as well as insulin/lantus.  Began to have redness 1-2 weeks ago which failed to improve w outpatient abx.  Send here by Ortho ([facility]) for admission / MRI/ IV abx and washould  I reviewed his MRI which shows osteo of 4th and 5th MT head  Repeat AGUSTINA shows no e/o plaque or ischemia  NPO  [place] today  Consult ID - will likely need 6 weeks abx.  Follow blood cx and would cultures  Continue Unasyn/vanc    Leukocytosis  Assessment & Plan  Improved today w IV abx    Essential hypertension- (present on admission)  Assessment & Plan  Optimize blood pressure management keep systolic blood pressure less than 140 diastolic under 90.  Continue lisinopril (renal protection)    Type 2 diabetes mellitus with hyperglycemia, without long-term current use of insulin (HCC)- (present on admission)  Assessment & Plan  Repeat A1C improved 8.5 from 10.5  -diabetic diet/education  -Continue [id number] plus SSI  Goal sugar is <150 to facilitate healing    Class 1 obesity due to excess calories without serious comorbidity with body mass index (BMI) of 33.0 to 33.9 in adult- (present on admission)  Assessment & Plan  Body mass index is 33.11 kg/m².  Outpatient weight loss management program encouraged         VTE prophylaxis: Lovenox

## 2020-09-09 NOTE — PROGRESS NOTES
Bedside report received from Day RN. Assumed care. POC discussed. Pt resting comfortably in bed. Safety precautions in place.

## 2020-09-09 NOTE — OR NURSING
1613: To PACU post right 5th toe I&D. OPA in place.  1628: OPA dc'd, breathing is spontaneous and unlabored.  1640: Pt awake. Denies pain or nausea.  1720: Meets criteria for transfer to room.

## 2020-09-09 NOTE — PROGRESS NOTES
Assessment completed patient A&Ox4, no c/o pain. Patient updated that  called, wants patient to be clear liquid after 0000 for possible surgery tomorrow, MD to be by in am for further explanation. Patient verbalizes understanding, no other needs at this time.

## 2020-09-09 NOTE — PROGRESS NOTES
Bedside report given to Connie RN. POC discussed. Pt resting comfortably in bed. Safety precautions in place.

## 2020-09-09 NOTE — WOUND TEAM
Wound RN in to assess patient right foot 4th toe and 5th incision site. Orders placed for nursing. Ortho consulted. Full note to follow.

## 2020-09-09 NOTE — CARE PLAN
Problem: Communication  Goal: The ability to communicate needs accurately and effectively will improve  Outcome: PROGRESSING AS EXPECTED  Intervention: [place] patient and significant other/support system to call light to alert staff of needs  Flowsheets (Taken 9/8/2020 1801)  Oriented to:: All of the Following : Location of Bathroom, Visiting Policy, Unit Routine, Call Light and Bedside Controls, Bedside Rail Policy, Smoking Policy, Rights and Responsibilities, Bedside Report, and Patient Education Notebook  Note: Pt oriented to unit upon arrival from ER. Pt instructed to utilize call light to call for assistance as needed. Pt verbalized understanding. Hourly rounding in place to ensure needs are met.       Problem: Venous Thromboembolism (VTW)/Deep Vein Thrombosis (DVT) Prevention:  Goal: Patient will participate in Venous Thrombosis (VTE)/Deep Vein Thrombosis (DVT)Prevention Measures  Outcome: PROGRESSING AS EXPECTED  Flowsheets (Taken 9/8/2020 1645)  Mechanical Prophylaxis: SCDs, Sequential Compression Device  SCDs, Sequential Compression Device: (pt ambulatory) Off  Pharmacologic Prophylaxis Used: Unfractionated Heparin  Note: Pt prescribed heparin and aspirin for VTE prophylaxis. Pt ambulatory, SCDs not worn at this time. Pt encouraged to ambulate frequently throughout stay.

## 2020-09-09 NOTE — ANESTHESIA PROCEDURE NOTES
Airway    Date/Time: 9/9/2020 3:44 PM  Performed by: Tobey Gansert, M.D.  Authorized by: Tobey Gansert, M.D.     Location:  OR  Urgency:  Elective  Indications for Airway Management:  Anesthesia      Spontaneous Ventilation: absent    Sedation Level:  Deep  Preoxygenated: Yes    Final Airway Type:  Supraglottic airway  Final Supraglottic Airway:  Standard LMA    SGA Size:  4  Number of Attempts at Approach:  1

## 2020-09-09 NOTE — WOUND TEAM
[facility]  Inpatient Services  Initial Wound and Skin Care Evaluation    Admission Date: 9/8/2020     Last order of IP CONSULT TO WOUND CARE was found on 9/8/2020 from Hospital Encounter on 9/8/2020     HPI, PMH, SH: Reviewed    Unit where seen by Wound Team: 2209/02     WOUND CONSULT/FOLLOW UP RELATED TO:  Right 4th and 5th toe     Self Report / Pain Level:  No pain. States 0/10. No pre-med.        OBJECTIVE:  Foot open to air, patient just admitted to floor. On regular redistribution mattress, up self.     WOUND TYPE, LOCATION, CHARACTERISTICS (Pressure Injuries: location, stage, POA or date identified)  Wound 09/08/20 Diabetic Ulcer Toe, 4th;Toe, 5th Right POA (Active)   Wound Image       Site Assessment Black;Intact;Dry    Periwound Assessment Warm;Red;Pink;Dry;Callused    Margins Attached edges;Undefined edges    Closure Open to air    Drainage Amount None    Treatments Cleansed;Site care    Wound Cleansing Normal Saline Irrigation    Periwound Protectant Not Applicable    Dressing Cleansing/Solutions 3% Betadine    Dressing Options Open to Air    Dressing Changed New    Dressing Status Clean;Dry;Intact    Dressing Change/Treatment Frequency Every Shift, and As Needed    NEXT Dressing Change/Treatment Date 09/09/20    NEXT Weekly Photo (Inpatient Only) 09/15/20    Non-staged Wound Description Full thickness    Wound Length (cm) 2.5 cm    Wound Width (cm) 2.5 cm    Wound Surface Area (cm^2) 6.25 cm^2    Number of days: 0        Vascular:    AGUSTINA:   No results found.    Lab Values:    Lab Results   Component Value Date/Time    WBC 13.6 (H) 09/08/2020 02:22 PM    RBC 5.25 09/08/2020 02:22 PM    HEMOGLOBIN 16.0 09/08/2020 02:22 PM    HEMATOCRIT 46.2 09/08/2020 02:22 PM    CREACTPROT 0.71 09/08/2020 02:22 PM    SEDRATEWES 45 (H) 08/01/2020 05:24 AM    HBA1C 10.4 (H) 08/01/2020 05:24 AM        Culture Results show:  No results found for this or any previous visit (from the past 720  hour(s)).    INTERVENTIONS BY WOUND TEAM:  Patient and chart reviewed. In to see patient as soon as admitted from ER to floor. Right foot open to air. Patient right 5th toe amputation site with dried black eschar and plantar/posterior 4th toe also with dried black eschar. Patient states no drainage. Picture and measurement taken. Cleansed with NS and gauze. Painted both wounds with Betadine 3%, left open to air. Bedside RN updated, orders placed.     Interdisciplinary consultation: Patient, Bedside RN (Will)    EVALUATION / RATIONALE FOR TREATMENT: Patient admitted with worsening redness to the patient amputation site on his right 5th toe, and has wound on 4th plantar toe. AGUSTINA's were being completed when this RN was at bedside. Orthopedic and surgeon consulted to assess foot. Right 4th plantar toe and 5th amp site with dried eschar cap, unable to assess depth of wound. Applied betadine 3% to keep area dry and intact. Wound team will follow up later this week after surgery/ortho sees and assess' patient.      Goals: Steady decrease in wound area and depth weekly.    NURSING PLAN OF CARE ORDERS (X):    Dressing changes: See Dressing Care orders: X  Skin care: See Skin Care orders:   Rectal tube care: See Rectal Tube Care orders:   Other orders:    RSKIN:   CURRENTLY IN PLACE (X), APPLIED THIS VISIT (A), ORDERED (O):   Q shift Tacho:  X  Q shift pressure point assessments:  X  Pressure redistribution mattress X          Low Airloss          Bariatric IKE         Bariatric foam           Heel float boots     Heel Silicone dressing        Float Heels off Bed with Pillows               Barrier wipes         Barrier Cream         Barrier paste          Sacral silicone dressing         Silicone O2 tubing         Anchorfast         Cannula fixation Device (Tender )          Gray Foam Ear protectors     High flow offloading Clip    Elastic head band offloading device                                                       Trach with Optifoam split foam       Z Rogelio Pillow                             Waffle cushion        Waffle Overlay         Rectal tube or BMS    Purwick/Condom Cath          Antifungal tx      Interdry          Reposition q 2 hours    X  TAPs Turning system                 Up to chair        Ambulate X     PT/OT        Dietician        Diabetes Education      PO    X TF     TPN     NPO  (will be NPO at midnight) # days   Other        WOUND TEAM PLAN OF CARE:   Dressing changes by wound team:                   Follow up 3 times weekly:                NPWT change 3 times weekly:     Follow up 1-2 times weekly:  X - nursing orders placed, wound team will follow up this week.    Follow up Bi-Monthly:                   Follow up as needed:       Other (explain):     Anticipated discharge plans: TBD - will likely follow up with ortho MD.  LTACH:        SNF/Rehab:                  Home Health Care:           Outpatient Wound Center:            Self Care:

## 2020-09-09 NOTE — OR NURSING
1501: Pt brought to pre-op holding via bed by transport, pt tolerated transfer well, pain is tolerable, no nausea, awake and alert, on room air.  1535: Patient allergies and NPO status verified, home medication reconciliation completed and belongings secured. Patient verbalizes understanding of pain scale, expected course of stay and plan of care. Surgical site verified with patient. IV access established. Sequentials placed on legs.

## 2020-09-09 NOTE — ANESTHESIA POSTPROCEDURE EVALUATION
Patient: Mc Eugene    Procedure Summary     Date: 09/09/20 Room / Location:  OR  / SURGERY [facility]    Anesthesia Start: 1536 Anesthesia Stop: 1615    Procedures:       AMPUTATION, TOE - 5th (Right Fifth Toe)      INCISION AND DRAINAGE - POSSIBLE METATARSAL RESECTION (Right Fifth Toe) Diagnosis: (Right necrotic 5th toe)    Surgeon: Liban Mallory M.D. Responsible Provider: Tobey Gansert, M.D.    Anesthesia Type: general ASA Status: 3 - Emergent          Final Anesthesia Type: general  Last vitals  BP   Blood Pressure: 138/74    Temp   36.6 °C (97.9 °F)    Pulse   Pulse: 91   Resp   18    SpO2   97 %      Anesthesia Post Evaluation    Patient location during evaluation: PACU  Patient participation: complete - patient participated  Level of consciousness: awake and alert    Airway patency: patent  Anesthetic complications: no  Cardiovascular status: hemodynamically stable  Respiratory status: acceptable  Hydration status: euvolemic    PONV: none           Nurse Pain Score: 0 (NPRS)

## 2020-09-09 NOTE — ANESTHESIA PREPROCEDURE EVALUATION
Relevant Problems   CARDIAC   (+) Essential hypertension      ENDO   (+) Type 2 diabetes mellitus with hyperglycemia, without long-term current use of insulin (HCC)       Physical Exam    Airway   Mallampati: II  TM distance: >3 FB  Neck ROM: full       Cardiovascular - normal exam  Rhythm: regular  Rate: normal  (-) murmur     Dental - normal exam           Pulmonary - normal exam  Breath sounds clear to auscultation     Abdominal    Neurological - normal exam                 Anesthesia Plan    ASA 3- EMERGENT   ASA physical status 3 criteria: diabetes - poorly controlledASA physical status emergent criteria: acute ischemia (limb, body part, tissue)    Plan - general       Airway plan will be LMA        Induction: intravenous    Postoperative Plan: Postoperative administration of opioids is intended.    Pertinent diagnostic labs and testing reviewed    Informed Consent:    Anesthetic plan and risks discussed with patient.    Use of blood products discussed with: patient whom consented to blood products.

## 2020-09-09 NOTE — ANESTHESIA POSTPROCEDURE EVALUATION
Patient: Mc Eugene    Procedure Summary     Date: 09/09/20 Room / Location:  OR  / SURGERY [facility]    Anesthesia Start: 1536 Anesthesia Stop: 1615    Procedures:       AMPUTATION, TOE - 5th (Right Fifth Toe)      INCISION AND DRAINAGE - POSSIBLE METATARSAL RESECTION (Right Fifth Toe) Diagnosis: (Right necrotic 5th toe)    Surgeon: Liban Mallory M.D. Responsible Provider: Tobey Gansert, M.D.    Anesthesia Type: general ASA Status: 3 - Emergent          Final Anesthesia Type: general  Last vitals  BP   Blood Pressure: 138/74    Temp   36.6 °C (97.9 °F)    Pulse   Pulse: 91   Resp   18    SpO2   97 %      Anesthesia Post Evaluation    Patient location during evaluation: PACU  Patient participation: complete - patient participated  Level of consciousness: awake and alert    Airway patency: patent  Anesthetic complications: no  Cardiovascular status: hemodynamically stable  Respiratory status: acceptable  Hydration status: euvolemic    PONV: none           Nurse Pain Score: 0 (NPRS)

## 2020-09-10 LAB
ALBUMIN SERPL BCP-MCNC: 3.3 G/DL (ref 3.2–4.9)
BACTERIA WND AEROBE CULT: ABNORMAL
BACTERIA WND AEROBE CULT: ABNORMAL
BASOPHILS # BLD AUTO: 0.8 % (ref 0–1.8)
BASOPHILS # BLD: 0.11 K/UL (ref 0–0.12)
BUN SERPL-MCNC: 13 MG/DL (ref 8–22)
CALCIUM SERPL-MCNC: 8.5 MG/DL (ref 8.4–10.2)
CHLORIDE SERPL-SCNC: 103 MMOL/L (ref 96–112)
CO2 SERPL-SCNC: 24 MMOL/L (ref 20–33)
CREAT SERPL-MCNC: 0.92 MG/DL (ref 0.5–1.4)
EOSINOPHIL # BLD AUTO: 0.26 K/UL (ref 0–0.51)
EOSINOPHIL NFR BLD: 1.9 % (ref 0–6.9)
ERYTHROCYTE [DISTWIDTH] IN BLOOD BY AUTOMATED COUNT: 42.3 FL (ref 35.9–50)
GLUCOSE BLD-MCNC: 77 MG/DL (ref 65–99)
GLUCOSE BLD-MCNC: 83 MG/DL (ref 65–99)
GLUCOSE BLD-MCNC: 87 MG/DL (ref 65–99)
GLUCOSE SERPL-MCNC: 76 MG/DL (ref 65–99)
GRAM STN SPEC: ABNORMAL
GRAM STN SPEC: NORMAL
GRAM STN SPEC: NORMAL
HCT VFR BLD AUTO: 41.5 % (ref 42–52)
HGB BLD-MCNC: 14.2 G/DL (ref 14–18)
IMM GRANULOCYTES # BLD AUTO: 0.05 K/UL (ref 0–0.11)
IMM GRANULOCYTES NFR BLD AUTO: 0.4 % (ref 0–0.9)
LYMPHOCYTES # BLD AUTO: 2.69 K/UL (ref 1–4.8)
LYMPHOCYTES NFR BLD: 20 % (ref 22–41)
MAGNESIUM SERPL-MCNC: 1.9 MG/DL (ref 1.5–2.5)
MCH RBC QN AUTO: 30.4 PG (ref 27–33)
MCHC RBC AUTO-ENTMCNC: 34.2 G/DL (ref 33.7–35.3)
MCV RBC AUTO: 88.9 FL (ref 81.4–97.8)
MONOCYTES # BLD AUTO: 1.2 K/UL (ref 0–0.85)
MONOCYTES NFR BLD AUTO: 8.9 % (ref 0–13.4)
NEUTROPHILS # BLD AUTO: 9.17 K/UL (ref 1.82–7.42)
NEUTROPHILS NFR BLD: 68 % (ref 44–72)
NRBC # BLD AUTO: 0 K/UL
NRBC BLD-RTO: 0 /100 WBC
PHOSPHATE SERPL-MCNC: 3.6 MG/DL (ref 2.5–4.5)
PLATELET # BLD AUTO: 267 K/UL (ref 164–446)
PMV BLD AUTO: 9.8 FL (ref 9–12.9)
POTASSIUM SERPL-SCNC: 3.7 MMOL/L (ref 3.6–5.5)
RBC # BLD AUTO: 4.67 M/UL (ref 4.7–6.1)
SIGNIFICANT IND 70042: ABNORMAL
SIGNIFICANT IND 70042: NORMAL
SIGNIFICANT IND 70042: NORMAL
SITE SITE: ABNORMAL
SITE SITE: NORMAL
SITE SITE: NORMAL
SODIUM SERPL-SCNC: 139 MMOL/L (ref 135–145)
SOURCE SOURCE: ABNORMAL
SOURCE SOURCE: NORMAL
SOURCE SOURCE: NORMAL
WBC # BLD AUTO: 13.5 K/UL (ref 4.8–10.8)

## 2020-09-10 PROCEDURE — A9270 NON-COVERED ITEM OR SERVICE: HCPCS | Performed by: INTERNAL MEDICINE

## 2020-09-10 PROCEDURE — 99232 SBSQ HOSP IP/OBS MODERATE 35: CPT | Performed by: INTERNAL MEDICINE

## 2020-09-10 PROCEDURE — 80069 RENAL FUNCTION PANEL: CPT

## 2020-09-10 PROCEDURE — 90471 IMMUNIZATION ADMIN: CPT

## 2020-09-10 PROCEDURE — 83735 ASSAY OF MAGNESIUM: CPT

## 2020-09-10 PROCEDURE — 3E0234Z INTRODUCTION OF SERUM, TOXOID AND VACCINE INTO MUSCLE, PERCUTANEOUS APPROACH: ICD-10-PCS | Performed by: INTERNAL MEDICINE

## 2020-09-10 PROCEDURE — 82962 GLUCOSE BLOOD TEST: CPT

## 2020-09-10 PROCEDURE — 700111 HCHG RX REV CODE 636 W/ 250 OVERRIDE (IP): Performed by: INTERNAL MEDICINE

## 2020-09-10 PROCEDURE — 700111 HCHG RX REV CODE 636 W/ 250 OVERRIDE (IP)

## 2020-09-10 PROCEDURE — 770006 HCHG ROOM/CARE - MED/SURG/GYN SEMI*

## 2020-09-10 PROCEDURE — 700102 HCHG RX REV CODE 250 W/ 637 OVERRIDE(OP): Performed by: HOSPITALIST

## 2020-09-10 PROCEDURE — 700101 HCHG RX REV CODE 250

## 2020-09-10 PROCEDURE — 700102 HCHG RX REV CODE 250 W/ 637 OVERRIDE(OP): Performed by: INTERNAL MEDICINE

## 2020-09-10 PROCEDURE — 700111 HCHG RX REV CODE 636 W/ 250 OVERRIDE (IP): Performed by: HOSPITALIST

## 2020-09-10 PROCEDURE — A9270 NON-COVERED ITEM OR SERVICE: HCPCS | Performed by: HOSPITALIST

## 2020-09-10 PROCEDURE — 700101 HCHG RX REV CODE 250: Performed by: HOSPITALIST

## 2020-09-10 PROCEDURE — 90715 TDAP VACCINE 7 YRS/> IM: CPT | Performed by: INTERNAL MEDICINE

## 2020-09-10 PROCEDURE — 36415 COLL VENOUS BLD VENIPUNCTURE: CPT

## 2020-09-10 PROCEDURE — 700105 HCHG RX REV CODE 258: Performed by: HOSPITALIST

## 2020-09-10 PROCEDURE — 99223 1ST HOSP IP/OBS HIGH 75: CPT | Performed by: INTERNAL MEDICINE

## 2020-09-10 PROCEDURE — 700105 HCHG RX REV CODE 258

## 2020-09-10 PROCEDURE — 85025 COMPLETE CBC W/AUTO DIFF WBC: CPT

## 2020-09-10 RX ORDER — SULFAMETHOXAZOLE AND TRIMETHOPRIM 800; 160 MG/1; MG/1
1 TABLET ORAL EVERY 12 HOURS
Status: DISCONTINUED | OUTPATIENT
Start: 2020-09-10 | End: 2020-09-12

## 2020-09-10 RX ADMIN — CLOSTRIDIUM TETANI TOXOID ANTIGEN (FORMALDEHYDE INACTIVATED), CORYNEBACTERIUM DIPHTHERIAE TOXOID ANTIGEN (FORMALDEHYDE INACTIVATED), BORDETELLA PERTUSSIS TOXOID ANTIGEN (GLUTARALDEHYDE INACTIVATED), BORDETELLA PERTUSSIS FILAMENTOUS HEMAGGLUTININ ANTIGEN (FORMALDEHYDE INACTIVATED), BORDETELLA PERTUSSIS PERTACTIN ANTIGEN, AND BORDETELLA PERTUSSIS FIMBRIAE 2/3 ANTIGEN 0.5 ML: 5; 2; 2.5; 5; 3; 5 INJECTION, SUSPENSION INTRAMUSCULAR at 17:23

## 2020-09-10 RX ADMIN — ASPIRIN 81 MG: 81 TABLET, COATED ORAL at 17:21

## 2020-09-10 RX ADMIN — HEPARIN SODIUM 5000 UNITS: 5000 INJECTION, SOLUTION INTRAVENOUS; SUBCUTANEOUS at 05:56

## 2020-09-10 RX ADMIN — AMPICILLIN SODIUM AND SULBACTAM SODIUM 3 G: 2; 1 INJECTION, POWDER, FOR SOLUTION INTRAMUSCULAR; INTRAVENOUS at 17:20

## 2020-09-10 RX ADMIN — ACETAMINOPHEN 650 MG: 325 TABLET, FILM COATED ORAL at 04:28

## 2020-09-10 RX ADMIN — AMPICILLIN SODIUM AND SULBACTAM SODIUM 3 G: 2; 1 INJECTION, POWDER, FOR SOLUTION INTRAMUSCULAR; INTRAVENOUS at 23:49

## 2020-09-10 RX ADMIN — LISINOPRIL 5 MG: 5 TABLET ORAL at 05:55

## 2020-09-10 RX ADMIN — AMPICILLIN SODIUM AND SULBACTAM SODIUM 3 G: 2; 1 INJECTION, POWDER, FOR SOLUTION INTRAMUSCULAR; INTRAVENOUS at 05:56

## 2020-09-10 RX ADMIN — SULFAMETHOXAZOLE AND TRIMETHOPRIM 1 TABLET: 800; 160 TABLET ORAL at 17:21

## 2020-09-10 RX ADMIN — ACETAMINOPHEN 650 MG: 325 TABLET, FILM COATED ORAL at 19:27

## 2020-09-10 RX ADMIN — SODIUM CHLORIDE: 9 INJECTION, SOLUTION INTRAVENOUS at 00:38

## 2020-09-10 RX ADMIN — AMPICILLIN SODIUM AND SULBACTAM SODIUM 3 G: 2; 1 INJECTION, POWDER, FOR SOLUTION INTRAMUSCULAR; INTRAVENOUS at 12:09

## 2020-09-10 RX ADMIN — SULFAMETHOXAZOLE AND TRIMETHOPRIM 1 TABLET: 800; 160 TABLET ORAL at 12:09

## 2020-09-10 RX ADMIN — VANCOMYCIN HYDROCHLORIDE 1500 MG: 500 INJECTION, POWDER, LYOPHILIZED, FOR SOLUTION INTRAVENOUS at 00:39

## 2020-09-10 RX ADMIN — HEPARIN SODIUM 5000 UNITS: 5000 INJECTION, SOLUTION INTRAVENOUS; SUBCUTANEOUS at 22:36

## 2020-09-10 RX ADMIN — LABETALOL HYDROCHLORIDE 10 MG: 5 INJECTION, SOLUTION INTRAVENOUS at 00:43

## 2020-09-10 RX ADMIN — SODIUM CHLORIDE: 9 INJECTION, SOLUTION INTRAVENOUS at 17:21

## 2020-09-10 RX ADMIN — HEPARIN SODIUM 5000 UNITS: 5000 INJECTION, SOLUTION INTRAVENOUS; SUBCUTANEOUS at 15:15

## 2020-09-10 RX ADMIN — ASPIRIN 81 MG: 81 TABLET, COATED ORAL at 05:55

## 2020-09-10 ASSESSMENT — ENCOUNTER SYMPTOMS
HALLUCINATIONS: 0
DIZZINESS: 0
BLOOD IN STOOL: 0
DIARRHEA: 0
CHILLS: 0
SORE THROAT: 0
BACK PAIN: 0
COUGH: 0
VOMITING: 0
NAUSEA: 0
DEPRESSION: 0
HEARTBURN: 0
HEADACHES: 0
WEAKNESS: 0
ABDOMINAL PAIN: 0
FEVER: 0
FOCAL WEAKNESS: 0
MYALGIAS: 0
PALPITATIONS: 0
SHORTNESS OF BREATH: 0

## 2020-09-10 ASSESSMENT — COGNITIVE AND FUNCTIONAL STATUS - GENERAL
SUGGESTED CMS G CODE MODIFIER MOBILITY: CH
MOBILITY SCORE: 24
DAILY ACTIVITIY SCORE: 24
SUGGESTED CMS G CODE MODIFIER DAILY ACTIVITY: CH

## 2020-09-10 ASSESSMENT — PAIN DESCRIPTION - PAIN TYPE: TYPE: SURGICAL PAIN

## 2020-09-10 NOTE — PROGRESS NOTES
Pt received from transport. Pt is showing no sign of distress. Dressing clean, dry and intact. Will continue to monitor.

## 2020-09-10 NOTE — PROGRESS NOTES
"Pharmacy Kinetics 54 y.o. male on vancomycin day # 3 9/10/2020    Currently on Vancomycin 1500 mg iv q18hr    Indication for Treatment: Osteomyelitis      Pertinent history per medical record: Admitted on 2020 for  worsening redness of amputation site done on 20. AGUSTINA shows severe ischemia of the 4th and 5th toes. MRI of foot on  indicates osteomyelitis of 4th and 5th metatarsal heads    Other antibiotics: Unasyn 3 gm IV Q6H    Allergies: Patient has no known allergies.     List concerns for renal function: obesity BMI 33.11    Pertinent cultures to date:    BCx2 NGTD   Right food wound - Positive for S. aureus      Recent Labs     20  1422 20  0331 09/10/20  0231   WBC 13.6* 10.0 13.5*   NEUTSPOLYS 67.80  --  68.00     Recent Labs     20  1422 20  0331 09/10/20  0231   BUN 18 14 13   CREATININE 1.04 0.79 0.92   ALBUMIN 3.9  --  3.3     Recent Labs     20  1748   VANCOTROUGH 18.8       Intake/Output Summary (Last 24 hours) at 9/10/2020 0705  Last data filed at 9/10/2020 0534  Gross per 24 hour   Intake 2152.93 ml   Output 2 ml   Net 2150.93 ml      /79   Pulse 89   Temp 36.7 °C (98 °F) (Oral)   Resp 17   Ht 1.702 m (5' 7\")   Wt 95.9 kg (211 lb 6.7 oz)   SpO2 97%  Temp (24hrs), Av.6 °C (97.8 °F), Min:36.3 °C (97.3 °F), Max:36.7 °C (98 °F)      A/P   1. Vancomycin dose change: 1500 mg IV Q18H  2. Next vancomycin level:  @ 1130  3. Goal trough: 10-15 mcg/mL  4. Comments: Will monitor and adjust per protocol    Christianne Franz PharmD Candidate       "

## 2020-09-10 NOTE — PROGRESS NOTES
"Pharmacy Kinetics 54 y.o. male on vancomycin day # 2 2020    Currently on [id number] mg iv q12hr  Provider specified end date: Not yet determined.    Indication for Treatment: Diabetic foot infection    Pertinent history per medical record: Admitted on 2020 for worsening redness of amputation site done on 20. AGUSTINA shows severe ischemia of the 4th and 5th toes. MRI of foot ordered to rule out osteomyelitis.    Other antibiotics: Unasyn 3 Gm IV every 6 hours.    Allergies: Patient has no known allergies.     List concerns for renal function Obesity  Pertinent cultures to date:    PCx2 in process    MRSA nares swab if pneumonia is a concern (ordered/positive/negative/n-a): N/A    Recent Labs     20  1422 20  0331   WBC 13.6* 10.0   NEUTSPOLYS 67.80  --      Recent Labs     20  1422 20  0331   BUN 18 14   CREATININE 1.04 0.79   ALBUMIN 3.9  --      Recent Labs     20  1748   VANCOTROUGH 18.8       Intake/Output Summary (Last 24 hours) at 2020 1857  Last data filed at 2020 1615  Gross per 24 hour   Intake 2609.8 ml   Output 2 ml   Net 2607.8 ml      /92   Pulse 87   Temp 36.3 °C (97.3 °F) (Temporal)   Resp 16   Ht 1.702 m (5' 7\")   Wt 95.9 kg (211 lb 6.7 oz)   SpO2 94%  Temp (24hrs), Av.5 °C (97.7 °F), Min:36.3 °C (97.3 °F), Max:36.6 °C (97.9 °F)      A/P   1. Vancomycin dose change: 1500 mg IV every 18 hours.  2. Next vancomycin level: couple of days.   3. Goal trough: 10-15 mcg/ml  4. Comments: Will monitor and adjust regimen per protocol.    Som Garcia [facility]    "

## 2020-09-10 NOTE — PROGRESS NOTES
Received report, assumed pt care. Discussed POC. Rt foot dressing CDI. VSS. Will cont to monitor.

## 2020-09-10 NOTE — PROGRESS NOTES
0045 - Pts /86. Gave PRN labetalol, will recheck in 1 hour.  0200 - BP down to 128/72.  Pain was managed with Tylenol overnight.  BG this morning was 77.  No other events.

## 2020-09-10 NOTE — CONSULTS
"DATE OF SERVICE:  09/10/2020    INFECTIOUS DISEASE CONSULTATION    REASON FOR CONSULT:  Osteomyelitis of the right 4th and 5th digits of the foot.      CONSULTING PHYSICIAN:  Edith Kaminski DO    HISTORY OF PRESENT ILLNESS:  This is a 54-year-old diabetic male who was   admitted to the hospital on 09/08/2020 due to worsening erythema and ischemic   necrosis of his fifth metatarsal amputation site as well as the distal tip of   his fourth digit on his right foot.  His right fifth digit amputation was done   on 08/06/2020.  Patient states his arterial flow at that time showed a \"clot   near the toes.\"  No revascularization procedure was done.  He underwent MRI of   the foot, which showed osteomyelitis at the amputation site as well as the   distal metatarsal of the fourth digit.  He denies any associated fever,   chills, nausea, vomiting or diarrhea.  He is a diabetic.  His blood sugars   have been poorly controlled.  His hemoglobin A1c is 10.4.  He has been seen   and evaluated by orthopedics on 08/09/2020, he underwent a right fifth toe   amputation site irrigation and debridement with direct closure.  Does not   appear any debridement or amputation was done on the fourth digit per the   operative note.  His prior wound cultures in August grew methicillin-sensitive   Staph aureus and acinetobacter baumannii.  His current wound culture is   growing methicillin-sensitive Staph aureus, presumably operative cultures are   pending.  There is no current pathology.  Infectious disease is consulted for   antibiotic recommendations and management.  He is currently receiving Unasyn.    Patient states he has not seen a doctor in many years and does not recall his   last tetanus shot.      REVIEW OF SYSTEMS:  Positive for pain in the right foot.  Otherwise, all other   systems are reviewed and are negative.      ALLERGIES:  He has no known drug allergies.      PAST MEDICAL HISTORY:  Diabetes, right fifth toe amputation on " 2020,   congenital kidney anomaly.      FAMILY HISTORY:  Lung cancer in his mother, which she  from.      SOCIAL HISTORY:  He does occasionally smoke marijuana.  He rarely drinks   alcohol.  He says maybe once a month.  Does not use IV drugs.  He has no pet   or animal exposures.      PHYSICAL EXAMINATION:    GENERAL:  He is a well-nourished, well-developed male in no acute distress,   pleasant, cooperative.    VITAL SIGNS:  He has been afebrile since admission, current temperature is   98.1, blood pressure 121/72, pulse 99, respiratory rate 18, oxygen saturation   95-97% on room air, weighs 95.6 kilos.  He is 5 feet 7 inches.  His BMI is 33.      HEENT:  Normocephalic, atraumatic.  Pupils equal, round, reactive to light.    Extraocular movements intact.  Oropharynx is clear.  He has fair dentition.    NECK:  Supple.  There is no JVD or stridor.    CARDIOVASCULAR:  Regular rate and rhythm, unable to auscultate any murmurs or   gallops.    CHEST:  Clear to auscultation bilaterally, unlabored.  There is no wheezing or   rhonchi.    ABDOMEN:  Soft, nontender, nondistended.  There is no rebound or guarding.    EXTREMITIES:  Show no cyanosis.  There is mild clubbing.  His right foot is in   a surgical dressing.  There is no surrounding cellulitis.  Pictures were   reviewed in media and he had ischemic and necrotic-appearing,   gangrenous-appearing ulcerations at his right fifth amputation site.  There   was surrounding erythema of the forefoot.  There is a dry eschar at the distal   tip of his fourth metatarsal.    NEUROLOGIC:  Otherwise, he is alert and oriented.  Speech is fluent without   dysarthria.  Cranial nerves are intact.    PSYCHIATRIC:  Mood is normal.  Affect is normal.  Thought is normal.      LABORATORY DATA:  White blood cell count is 13.5, H and H of 14.2 and 41.5,   platelets of 267.  Sodium 139, potassium 3.7, chloride 103, bicarbonate 24,   glucose 76, BUN 13, creatinine 0.92.  Total  cholesterol 208.  He was given a   dose of vancomycin, apparently.  AST 18, ALT 21.  COVID was negative.  Right   foot wound swab was positive for Staph aureus.  Blood culture x1 is negative.    Prior wound cultures in Aug grew methicillin-sensitive Staph aureus and   acinetobacter baumannii.      IMAGING:  MRI of his right foot showed prior fifth digit amputation,   osteomyelitis of the fourth and fifth metatarsal heads.  AGUSTINA done on 08/01   showed severe ischemia, likely embolic of the right fourth and fifth digits.    Repeat AGUSTINA done on 09/08 showed no evidence of arterial disease.  EKG on 08/01   showed an ejection fraction of 60%.      ASSESSMENT AND PLAN:  A 54-year-old poorly controlled diabetic male admitted   for wound dehiscence and gangrenous infection of his right fifth toe   amputation site as well as ischemic-appearing eschar on the distal tip of his   fourth right metatarsal with underlying osteomyelitis.  He is currently   afebrile without leukocytosis.  His prior cultures grew acinetobacter and   methicillin-sensitive Staph aureus.  No operative cultures are available as   yet.  His initial wound swab is showing methicillin-sensitive Staph aureus so   far, does not appear the pathology was sent and no amputation of the fourth   metatarsal.   AGUSTINA test initially done in August showed, he has had severe ischemia of both the   fourth and fifth digits. Findings were thought to be embolic-query etiology. The appearance of his wounds on admission were necrotic and concerning for ongoing poor blood flow; however, the repeat AGUSTINA did not show   any continued blockage.  Unclear efficacy of antimicrobials at this point.    Need final culture results prior to final antibiotic recommendations   and management as well as evaluation of the surgical site.  At this point, we will continue the Unasyn as it will treat   his currently isolated methicillin-sensitive Staph aureus, most enteric gram   negatives, as well as  anaerobes.  Add Bactrim due to his prior   isolated acinetobacter.  Diabetes, he needs much better blood sugar control to   promote healing.  High risk for limb loss. Tdap ordered.    Thank you and we will follow with you.       ____________________________________     MD VINNIE FRIEDMAN / JARRED    DD:  09/10/2020 10:57:54  DT:  09/10/2020 14:42:00    D#:  [id number]  Job#:  [id number]

## 2020-09-10 NOTE — PROGRESS NOTES
Hospital Medicine Daily Progress Note    Date of Service  9/10/2020    Chief Complaint  54 y.o. male admitted 9/8/2020 with right toe pain and worsening wound.      Hospital Course    Hx of DM recently started on insulin, recent ishcemic toe injury c/b necrosis/osteo requiring amputation of 5th toe admitted for worsening pain and redness at the surgical wound site despite starting OP abx.       Interval Problem Update  9/9: MRI shows osteo of 4th and 5th MT head. S/P  I&D.  I discussed with [place].  9/10: Consulted ID, RAY pedersen due to MSSA in cx.  Painful overnight, better this AM.  Blood cx negative so far.    Consultants/Specialty  Dr. Mallory - Ortho  Dr. Haddad - ID    Code Status  Full Code    Disposition  Home w IV abx.    Review of Systems  Review of Systems   Constitutional: Negative for chills, fever and malaise/fatigue.   HENT: Negative for sore throat.    Respiratory: Negative for cough and shortness of breath.    Cardiovascular: Negative for chest pain and palpitations.   Gastrointestinal: Negative for abdominal pain, blood in stool, diarrhea, heartburn, nausea and vomiting.   Genitourinary: Negative for dysuria and frequency.   Musculoskeletal: Positive for joint pain (+ foot pain overnight and this AM). Negative for back pain and myalgias.   Skin: Positive for rash.        Redness and pain near 4th and 5th toes   Neurological: Negative for dizziness, focal weakness, weakness and headaches.   Psychiatric/Behavioral: Negative for depression and hallucinations.   All other systems reviewed and are negative.       Physical Exam  Temp:  [36.3 °C (97.3 °F)-36.7 °C (98.1 °F)] 36.7 °C (98.1 °F)  Pulse:  [81-99] 99  Resp:  [12-20] 18  BP: (105-151)/(53-92) 121/72  SpO2:  [90 %-99 %] 95 %    Physical Exam  Constitutional:       Appearance: Normal appearance.   HENT:      Head: Normocephalic and atraumatic.      Nose: Nose normal.      Mouth/Throat:      Mouth: Mucous membranes are moist.   Eyes:       Extraocular Movements: Extraocular movements intact.      Pupils: Pupils are equal, round, and reactive to light.   Neck:      Musculoskeletal: Normal range of motion and neck supple.   Cardiovascular:      Rate and Rhythm: Normal rate and regular rhythm.      Heart sounds: No murmur.   Pulmonary:      Effort: Pulmonary effort is normal. No respiratory distress.      Breath sounds: Normal breath sounds. No stridor.   Abdominal:      General: Abdomen is flat. Bowel sounds are normal. There is no distension.      Palpations: Abdomen is soft.      Tenderness: There is no abdominal tenderness.   Musculoskeletal:         General: Tenderness present. No deformity.   Skin:     General: Skin is warm and dry.      Comments: Dressing c/d/i, R foot   Neurological:      General: No focal deficit present.      Mental Status: He is alert and oriented to person, place, and time. Mental status is at baseline.   Psychiatric:         Mood and Affect: Mood normal.         Behavior: Behavior normal.         Thought Content: Thought content normal.         Fluids    Intake/Output Summary (Last 24 hours) at 9/10/2020 1346  Last data filed at 9/10/2020 0534  Gross per 24 hour   Intake 1300 ml   Output 2 ml   Net 1298 ml       Laboratory  Recent Labs     09/08/20  1422 09/09/20  0331 09/10/20  0231   WBC 13.6* 10.0 13.5*   RBC 5.25 5.24 4.67*   HEMOGLOBIN 16.0 15.8 14.2   HEMATOCRIT 46.2 47.7 41.5*   MCV 88.0 91.0 88.9   MCH 30.5 30.2 30.4   MCHC 34.6 33.1* 34.2   RDW 41.9 42.5 42.3   PLATELETCT 304 248 267   MPV 10.1 9.9 9.8     Recent Labs     09/08/20  1422 09/09/20  0331 09/10/20  0231   SODIUM 139 140 139   POTASSIUM 4.2 4.1 3.7   CHLORIDE 104 105 103   CO2 23 22 24   GLUCOSE 152* 81 76   BUN 18 14 13   CREATININE 1.04 0.79 0.92   CALCIUM 9.3 8.9 8.5             Recent Labs     09/09/20  0331   TRIGLYCERIDE 224*   HDL 30*   *       Imaging  MR-FOOT-W/O RIGHT   Final Result      1.  Prior 5th digit amputation      2.   Osteomyelitis of the 5th and 4th metatarsal heads      US-AGUSTINA SINGLE LEVEL BILAT   Final Result           Assessment/Plan  * Diabetic foot infection (HCC)- (present on admission)  Assessment & Plan  On 8/6/2020 patient had his fifth right toe amputated due to diabetic infection and ischemic emboli to foot (no source was identified).  He was discharged on ASA and abx as well as insulin/lantus.  Redness improved but returned when antibiotics were discontinued therefore outpatient antibiotics were represcribed.  Despite this, redness worsened again and he developed a dark eschar over his surgical site  Sent here by ortho  MRI shows osteo of 4th and 5th MT head s/p debridement on 9/9/20  Repeat AGUSTINA shows no e/o plaque or ischemia  Discussed with ID - will likely need 6 weeks abx/outpatient infusion  Wound cx growing MSSA  Place PICC in AM if cultures are negative  Continue Unasyn    Leukocytosis  Assessment & Plan  Resolved    Essential hypertension- (present on admission)  Assessment & Plan  Optimize blood pressure management keep systolic blood pressure less than 140 diastolic under 90.  Continue lisinopril    Type 2 diabetes mellitus with hyperglycemia, without long-term current use of insulin (HCC)- (present on admission)  Assessment & Plan  Repeat A1C improved 8.5 from 10.5  -Diabetic diet/education  -Continue lantus 22 plus SSI  Goal sugar is <150 to facilitate healing    Class 1 obesity due to excess calories without serious comorbidity with body mass index (BMI) of 33.0 to 33.9 in adult- (present on admission)  Assessment & Plan  Body mass index is 33.11 kg/m².  Outpatient weight loss management program encouraged       VTE prophylaxis: Lovenox

## 2020-09-10 NOTE — CONSULTS
Orthopaedic Surgery Consult Note:    Liban Mallory M.D.  Date & Time note created:    9/10/2020   7:56 AM       Patient ID:   Name:             Mc Eugene   YOB: 1965  Age:                 54 y.o.  male   MRN:               [id number]                                                             Reason for Consult:      Right 5th toe amputation wound dehiscence    History of Present Illness:    Mr. Eugene Is a pleasant gentleman who underwent a right fifth toe amputation roughly 4 weeks ago by myself. He presented to the clinic yesterday where he was found to have a wound dehiscence with foul odor and purulent drainage coming from the wound. He also had surrounding cellulitis with concerns for an abscess or osteomyelitis. He has since obtained an MRI which does show osteomyelitis of the fourth and fifth metatarsal heads.    Review of Systems:      Constitutional: Denies fevers, Denies weight changes  Eyes: Denies changes in vision, no eye pain  Ears/Nose/Throat/Mouth: Denies nasal congestion or sore throat   Cardiovascular: Denies chest pain   Respiratory: Denies shortness of breath , Denies cough  Gastrointestinal/Hepatic: Denies abdominal pain, nausea, vomiting, diarrhea, constipation or GI bleeding   Genitourinary: Denies dysuria or frequency  Musculoskeletal/Rheum: Right 4th and 5th toe ischemia now s/p 5th toe amputation  Skin: Denies rash  Neurological: Denies headache, confusion, memory loss or focal weakness/parasthesias  Psychiatric: denies mood disorder   Endocrine: Estella thyroid problems  Heme/Oncology/Lymph Nodes: Denies enlarged lymph nodes, denies brusing or known bleeding disorder  All other systems were reviewed and are negative (AMA/CMS criteria)                Past Medical History:   Past Medical History:   Diagnosis Date   • Diabetes (HCC)    • Kidney anomaly, congenital     born with 1 kidney     Active Hospital Problems    Diagnosis   • Diabetic foot infection (HCC)  [E11.628, L08.9]     Priority: High   • Leukocytosis [D72.829]     Priority: Medium   • Essential hypertension [I10]     Priority: Medium   • Type 2 diabetes mellitus with hyperglycemia, without long-term current use of insulin (HCC) [E11.65]     Priority: Medium   • Class 1 obesity due to excess calories without serious comorbidity with body mass index (BMI) of 33.0 to 33.9 in adult [E66.09, Z68.33]     Priority: Low       Past Surgical History:  Past Surgical History:   Procedure Laterality Date   • TOE AMPUTATION Right 8/6/2020    Procedure: AMPUTATION, TOE - 5th TOE;  Surgeon: Liban Mallory M.D.;  Location: SURGERY [facility];  Service: Orthopedics   • APPENDECTOMY         Hospital Medications:    Current Facility-Administered Medications:   •  aspirin EC (ECOTRIN) tablet 81 mg, 81 mg, Oral, BID, Hector Lewis M.D., 81 mg at 09/10/20 0555  •  insulin glargine (Lantus) injection, 22 Units, Subcutaneous, Q EVENING, Hector Lewis M.D., 22 Units at 09/09/20 1831  •  lisinopril (PRINIVIL) tablet 5 mg, 5 mg, Oral, DAILY, Hector Lewis M.D., 5 mg at 09/10/20 0555  •  acetaminophen (TYLENOL) tablet 650 mg, 650 mg, Oral, Q6HRS PRN, Hector Lewis M.D., 650 mg at 09/10/20 0428  •  labetalol (NORMODYNE/TRANDATE) injection 10 mg, 10 mg, Intravenous, Q4HRS PRN, Hetcor Lewis M.D., 10 mg at 09/10/20 0043  •  senna-docusate (PERICOLACE or SENOKOT S) 8.6-50 MG per tablet 2 Tab, 2 Tab, Oral, BID **AND** polyethylene glycol/lytes (MIRALAX) PACKET 1 Packet, 1 Packet, Oral, QDAY PRN **AND** magnesium hydroxide (MILK OF MAGNESIA) suspension 30 mL, 30 mL, Oral, QDAY PRN **AND** bisacodyl (DULCOLAX) suppository 10 mg, 10 mg, Rectal, QDAY PRN, Hector Lewis M.D.  •  NS infusion, , Intravenous, Continuous, Hector Lewis M.D., Last Rate: 83 mL/hr at 09/10/20 0038  •  heparin injection 5,000 Units, 5,000 Units, Subcutaneous, Q8HRS, Hector Lewis M.D., 5,000 Units at 09/10/20 0556  •  ampicillin/sulbactam (UNASYN) 3 g  in  mL IVPB, 3 g, Intravenous, Q6HRS, Hector Lewis M.D., Stopped at 09/10/20 0626  •  MD Alert...Vancomycin per Pharmacy, , Other, PRN, Hector Lewis M.D.  •  ondansetron (ZOFRAN) syringe/vial injection 4 mg, 4 mg, Intravenous, Q4HRS PRN, Hector Lewis M.D.  •  ondansetron (ZOFRAN ODT) dispertab 4 mg, 4 mg, Oral, Q4HRS PRN, Hector Lewis M.D.  •  promethazine (PHENERGAN) tablet 12.5-25 mg, 12.5-25 mg, Oral, Q4HRS PRN, Hector Lewis M.D.  •  promethazine (PHENERGAN) suppository 12.5-25 mg, 12.5-25 mg, Rectal, Q4HRS PRN, Hector Lewis M.D.  •  prochlorperazine (COMPAZINE) injection 5-10 mg, 5-10 mg, Intravenous, Q4HRS PRN, Hector Lewis M.D.  •  insulin regular (HumuLIN R,NovoLIN R) injection, 3-14 Units, Subcutaneous, 4X/DAY ACHS, Stopped at 20 1700 **AND** POC Blood Glucose, , , Q AC AND BEDTIME(S) **AND** NOTIFY MD and PharmD, , , Once **AND** glucose 4 g chewable tablet 16 g, 16 g, Oral, Q15 MIN PRN **AND** dextrose 50% (D50W) injection 50 mL, 50 mL, Intravenous, Q15 MIN PRN, Hector Lewis M.D.    Current Outpatient Medications:  Medications Prior to Admission   Medication Sig Dispense Refill Last Dose   • Insulin Lispro (HUMALOG KWIKPEN) 200 UNIT/ML Solution Pen-injector Inject 1-6 Units as instructed 2 times a day, with meals. Sliding Scale 151- 200 = 1 unit  201- 250 = 2 units  251-300 = 3 units  301-350 = 4 units  351-400 = 5 units  Over 400 = 6 units   2020 at 1200   • cephALEXin (KEFLEX) 500 MG Cap Take 500 mg by mouth 2 times a day. Pt started on 2020 for 7 day course   2020 at FINISHED   • [] aspirin EC 81 MG EC tablet Take 1 Tab by mouth 2 Times a Day for 30 days. 60 Tab 0 2020 at 0700   • [] LANTUS SOLOSTAR 100 UNIT/ML Solution Pen-injector injection Inject 22 Units as instructed every evening for 30 days. 9 mL 0 2020 at 2000   • lisinopril (PRINIVIL) 5 MG Tab Take 1 Tab by mouth every day for 30 days. 30 Tab 0 2020 at 0700       Medication  "Allergy:  No Known Allergies    Family History:  Family History   Problem Relation Age of Onset   • Lung Disease Mother    • Cancer Mother        Social History:  Social History     Socioeconomic History   • Marital status:      Spouse name: Not on file   • Number of children: Not on file   • Years of education: Not on file   • Highest education level: Not on file   Occupational History   • Not on file   Social Needs   • Financial resource strain: Not on file   • Food insecurity     Worry: Not on file     Inability: Not on file   • Transportation needs     Medical: Not on file     Non-medical: Not on file   Tobacco Use   • Smoking status: Former Smoker   • Smokeless tobacco: Never Used   Substance and Sexual Activity   • Alcohol use: Not Currently   • Drug use: Yes     Comment: marijuana vape   • Sexual activity: Not on file   Lifestyle   • Physical activity     Days per week: Not on file     Minutes per session: Not on file   • Stress: Not on file   Relationships   • Social connections     Talks on phone: Not on file     Gets together: Not on file     Attends [employer] service: Not on file     Active member of club or organization: Not on file     Attends meetings of clubs or organizations: Not on file     Relationship status: Not on file   • Intimate partner violence     Fear of current or ex partner: Not on file     Emotionally abused: Not on file     Physically abused: Not on file     Forced sexual activity: Not on file   Other Topics Concern   • Not on file   Social History Narrative   • Not on file         Physical Exam:  Vitals/ General Appearance:   Weight/BMI: Body mass index is 33.11 kg/m².  /79   Pulse 89   Temp 36.7 °C (98 °F) (Oral)   Resp 17   Ht 1.702 m (5' 7\")   Wt 95.9 kg (211 lb 6.7 oz)   SpO2 97%   Vitals:    09/09/20 2000 09/10/20 0000 09/10/20 0200 09/10/20 0534   BP: 146/82 151/86 128/72 135/79   Pulse: 88 81  89   Resp: 18 18 18 17   Temp: 36.5 °C (97.7 °F) 36.6 °C (97.9 °F) "  36.7 °C (98 °F)   TempSrc: Oral Oral  Oral   SpO2: 93% 94% 90% 97%   Weight:       Height:           Constitutional:   Well developed, Well nourished, No acute distress  HENMT:  Normocephalic, Atraumatic, Oropharynx moist mucous membranes, No oral exudates, Nose normal.  No thyromegaly.  Eyes:  EOMI, Conjunctiva normal, No discharge.  Neck:  Normal range of motion, No cervical tenderness,  no JVD.  Cardiovascular:  Regular rate and rhythm  Lungs:  Normal breathing  Abdomen: Soft, non-tender, non-distended.  Skin: Warm, Dry, No erythema, No rash, no induration.  Neurologic: Alert & oriented x 3, No focal deficits noted, cranial nerves II through X are grossly intact.  Psychiatric: Affect normal, Judgment normal, Mood normal.  Musculoskeletal: Wound dehiscence over the 5th toe amputation site with foul smelling discharge and surrounding erythema.  The erythema is improved compared to yesterday when he was seen in the clinic.  There is no fluctuance or signs of abscess on physical exam.  There is an eschar over the surgical site.    Lab Data Review:  Recent Results (from the past 24 hour(s))   ACCU-CHEK GLUCOSE    Collection Time: 09/09/20 10:33 AM   Result Value Ref Range    Glucose - Accu-Ck 104 (H) 65 - 99 mg/dL   ACCU-CHEK GLUCOSE    Collection Time: 09/09/20  5:40 PM   Result Value Ref Range    Glucose - Accu-Ck 75 65 - 99 mg/dL   VANCOMYCIN TROUGH LEVEL    Collection Time: 09/09/20  5:48 PM   Result Value Ref Range    Vancomycin Trough 18.8 10.0 - 20.0 ug/mL   ACCU-CHEK GLUCOSE    Collection Time: 09/09/20  9:31 PM   Result Value Ref Range    Glucose - Accu-Ck 128 (H) 65 - 99 mg/dL   CBC WITH DIFFERENTIAL    Collection Time: 09/10/20  2:31 AM   Result Value Ref Range    WBC 13.5 (H) 4.8 - 10.8 K/uL    RBC 4.67 (L) 4.70 - 6.10 M/uL    Hemoglobin 14.2 14.0 - 18.0 g/dL    Hematocrit 41.5 (L) 42.0 - 52.0 %    MCV 88.9 81.4 - 97.8 fL    MCH 30.4 27.0 - 33.0 pg    MCHC 34.2 33.7 - 35.3 g/dL    RDW 42.3 35.9 - 50.0 fL     Platelet Count 267 164 - 446 K/uL    MPV 9.8 9.0 - 12.9 fL    Neutrophils-Polys 68.00 44.00 - 72.00 %    Lymphocytes 20.00 (L) 22.00 - 41.00 %    Monocytes 8.90 0.00 - 13.40 %    Eosinophils 1.90 0.00 - 6.90 %    Basophils 0.80 0.00 - 1.80 %    Immature Granulocytes 0.40 0.00 - 0.90 %    Nucleated RBC 0.00 /100 WBC    Neutrophils (Absolute) 9.17 (H) 1.82 - 7.42 K/uL    Lymphs (Absolute) 2.69 1.00 - 4.80 K/uL    Monos (Absolute) 1.20 (H) 0.00 - 0.85 K/uL    Eos (Absolute) 0.26 0.00 - 0.51 K/uL    Baso (Absolute) 0.11 0.00 - 0.12 K/uL    Immature Granulocytes (abs) 0.05 0.00 - 0.11 K/uL    NRBC (Absolute) 0.00 K/uL   Renal Function Panel    Collection Time: 09/10/20  2:31 AM   Result Value Ref Range    Sodium 139 135 - 145 mmol/L    Potassium 3.7 3.6 - 5.5 mmol/L    Chloride 103 96 - 112 mmol/L    Co2 24 20 - 33 mmol/L    Glucose 76 65 - 99 mg/dL    Creatinine 0.92 0.50 - 1.40 mg/dL    Bun 13 8 - 22 mg/dL    Calcium 8.5 8.4 - 10.2 mg/dL    Phosphorus 3.6 2.5 - 4.5 mg/dL    Albumin 3.3 3.2 - 4.9 g/dL   MAGNESIUM    Collection Time: 09/10/20  2:31 AM   Result Value Ref Range    Magnesium 1.9 1.5 - 2.5 mg/dL   ESTIMATED GFR    Collection Time: 09/10/20  2:31 AM   Result Value Ref Range    GFR If African American >60 >60 mL/min/1.73 m 2    GFR If Non African American >60 >60 mL/min/1.73 m 2   ACCU-CHEK GLUCOSE    Collection Time: 09/10/20  6:03 AM   Result Value Ref Range    Glucose - Accu-Ck 77 65 - 99 mg/dL       Imaging: MRI reveals no signs of abscess but there is new osteomyelitis of the 4th and 5th metatarsal heads.    Assessment: Right 5th toe amputation site wound dehiscence with new 4th and 5th metatarsal head osteomyelitis    Plan: To the OR for I&D of the wound dehiscence and possible VAC placement.

## 2020-09-11 PROBLEM — Z79.4 TYPE 2 DIABETES MELLITUS WITH HYPERGLYCEMIA, WITH LONG-TERM CURRENT USE OF INSULIN (HCC): Status: ACTIVE | Noted: 2020-08-01

## 2020-09-11 LAB
ALBUMIN SERPL BCP-MCNC: 3.4 G/DL (ref 3.2–4.9)
BASOPHILS # BLD AUTO: 1 % (ref 0–1.8)
BASOPHILS # BLD: 0.09 K/UL (ref 0–0.12)
BUN SERPL-MCNC: 13 MG/DL (ref 8–22)
CALCIUM SERPL-MCNC: 8.7 MG/DL (ref 8.4–10.2)
CHLORIDE SERPL-SCNC: 105 MMOL/L (ref 96–112)
CO2 SERPL-SCNC: 25 MMOL/L (ref 20–33)
CREAT SERPL-MCNC: 1 MG/DL (ref 0.5–1.4)
EOSINOPHIL # BLD AUTO: 0.27 K/UL (ref 0–0.51)
EOSINOPHIL NFR BLD: 3.1 % (ref 0–6.9)
ERYTHROCYTE [DISTWIDTH] IN BLOOD BY AUTOMATED COUNT: 42.2 FL (ref 35.9–50)
GLUCOSE BLD-MCNC: 118 MG/DL (ref 65–99)
GLUCOSE BLD-MCNC: 82 MG/DL (ref 65–99)
GLUCOSE BLD-MCNC: 83 MG/DL (ref 65–99)
GLUCOSE SERPL-MCNC: 93 MG/DL (ref 65–99)
HCT VFR BLD AUTO: 42.2 % (ref 42–52)
HGB BLD-MCNC: 14.2 G/DL (ref 14–18)
IMM GRANULOCYTES # BLD AUTO: 0.03 K/UL (ref 0–0.11)
IMM GRANULOCYTES NFR BLD AUTO: 0.3 % (ref 0–0.9)
LYMPHOCYTES # BLD AUTO: 2.58 K/UL (ref 1–4.8)
LYMPHOCYTES NFR BLD: 29.3 % (ref 22–41)
MCH RBC QN AUTO: 30.5 PG (ref 27–33)
MCHC RBC AUTO-ENTMCNC: 33.6 G/DL (ref 33.7–35.3)
MCV RBC AUTO: 90.6 FL (ref 81.4–97.8)
MONOCYTES # BLD AUTO: 0.87 K/UL (ref 0–0.85)
MONOCYTES NFR BLD AUTO: 9.9 % (ref 0–13.4)
NEUTROPHILS # BLD AUTO: 4.97 K/UL (ref 1.82–7.42)
NEUTROPHILS NFR BLD: 56.4 % (ref 44–72)
NRBC # BLD AUTO: 0 K/UL
NRBC BLD-RTO: 0 /100 WBC
PHOSPHATE SERPL-MCNC: 3.3 MG/DL (ref 2.5–4.5)
PLATELET # BLD AUTO: 264 K/UL (ref 164–446)
PMV BLD AUTO: 9.9 FL (ref 9–12.9)
POTASSIUM SERPL-SCNC: 4.5 MMOL/L (ref 3.6–5.5)
RBC # BLD AUTO: 4.66 M/UL (ref 4.7–6.1)
SODIUM SERPL-SCNC: 139 MMOL/L (ref 135–145)
WBC # BLD AUTO: 8.8 K/UL (ref 4.8–10.8)

## 2020-09-11 PROCEDURE — A9270 NON-COVERED ITEM OR SERVICE: HCPCS | Performed by: INTERNAL MEDICINE

## 2020-09-11 PROCEDURE — 700102 HCHG RX REV CODE 250 W/ 637 OVERRIDE(OP): Performed by: INTERNAL MEDICINE

## 2020-09-11 PROCEDURE — 700105 HCHG RX REV CODE 258: Performed by: HOSPITALIST

## 2020-09-11 PROCEDURE — 36415 COLL VENOUS BLD VENIPUNCTURE: CPT

## 2020-09-11 PROCEDURE — A9270 NON-COVERED ITEM OR SERVICE: HCPCS | Performed by: HOSPITALIST

## 2020-09-11 PROCEDURE — 82962 GLUCOSE BLOOD TEST: CPT | Mod: 91

## 2020-09-11 PROCEDURE — 99232 SBSQ HOSP IP/OBS MODERATE 35: CPT | Performed by: INTERNAL MEDICINE

## 2020-09-11 PROCEDURE — 700111 HCHG RX REV CODE 636 W/ 250 OVERRIDE (IP): Performed by: HOSPITALIST

## 2020-09-11 PROCEDURE — 80069 RENAL FUNCTION PANEL: CPT

## 2020-09-11 PROCEDURE — 85025 COMPLETE CBC W/AUTO DIFF WBC: CPT

## 2020-09-11 PROCEDURE — 770006 HCHG ROOM/CARE - MED/SURG/GYN SEMI*

## 2020-09-11 PROCEDURE — 700102 HCHG RX REV CODE 250 W/ 637 OVERRIDE(OP): Performed by: HOSPITALIST

## 2020-09-11 RX ORDER — INSULIN GLARGINE 100 [IU]/ML
10 INJECTION, SOLUTION SUBCUTANEOUS EVERY EVENING
Status: DISCONTINUED | OUTPATIENT
Start: 2020-09-11 | End: 2020-09-12

## 2020-09-11 RX ADMIN — AMPICILLIN SODIUM AND SULBACTAM SODIUM 3 G: 2; 1 INJECTION, POWDER, FOR SOLUTION INTRAMUSCULAR; INTRAVENOUS at 05:31

## 2020-09-11 RX ADMIN — AMPICILLIN SODIUM AND SULBACTAM SODIUM 3 G: 2; 1 INJECTION, POWDER, FOR SOLUTION INTRAMUSCULAR; INTRAVENOUS at 23:23

## 2020-09-11 RX ADMIN — HEPARIN SODIUM 5000 UNITS: 5000 INJECTION, SOLUTION INTRAVENOUS; SUBCUTANEOUS at 15:33

## 2020-09-11 RX ADMIN — AMPICILLIN SODIUM AND SULBACTAM SODIUM 3 G: 2; 1 INJECTION, POWDER, FOR SOLUTION INTRAMUSCULAR; INTRAVENOUS at 12:23

## 2020-09-11 RX ADMIN — SULFAMETHOXAZOLE AND TRIMETHOPRIM 1 TABLET: 800; 160 TABLET ORAL at 17:21

## 2020-09-11 RX ADMIN — AMPICILLIN SODIUM AND SULBACTAM SODIUM 3 G: 2; 1 INJECTION, POWDER, FOR SOLUTION INTRAMUSCULAR; INTRAVENOUS at 17:21

## 2020-09-11 RX ADMIN — ASPIRIN 81 MG: 81 TABLET, COATED ORAL at 17:21

## 2020-09-11 RX ADMIN — SULFAMETHOXAZOLE AND TRIMETHOPRIM 1 TABLET: 800; 160 TABLET ORAL at 05:31

## 2020-09-11 RX ADMIN — HEPARIN SODIUM 5000 UNITS: 5000 INJECTION, SOLUTION INTRAVENOUS; SUBCUTANEOUS at 21:31

## 2020-09-11 RX ADMIN — HEPARIN SODIUM 5000 UNITS: 5000 INJECTION, SOLUTION INTRAVENOUS; SUBCUTANEOUS at 05:31

## 2020-09-11 RX ADMIN — LISINOPRIL 5 MG: 5 TABLET ORAL at 05:31

## 2020-09-11 RX ADMIN — ASPIRIN 81 MG: 81 TABLET, COATED ORAL at 05:31

## 2020-09-11 ASSESSMENT — ENCOUNTER SYMPTOMS
NAUSEA: 0
HEARTBURN: 0
DEPRESSION: 0
HALLUCINATIONS: 0
BLOOD IN STOOL: 0
SHORTNESS OF BREATH: 0
VOMITING: 0
CHILLS: 0
DIZZINESS: 0
FOCAL WEAKNESS: 0
BACK PAIN: 0
WEAKNESS: 0
DIARRHEA: 0
ABDOMINAL PAIN: 0
COUGH: 0
MYALGIAS: 0
FEVER: 0
SORE THROAT: 0
HEADACHES: 0
PALPITATIONS: 0

## 2020-09-11 ASSESSMENT — PAIN DESCRIPTION - PAIN TYPE: TYPE: SURGICAL PAIN

## 2020-09-11 NOTE — PROGRESS NOTES
Hospital Medicine Daily Progress Note    Date of Service  9/11/2020    Chief Complaint  54 y.o. male admitted 9/8/2020 with right toe pain and worsening wound.      Hospital Course    Hx of DM recently started on insulin, recent ishcemic toe injury c/b necrosis/osteo requiring amputation of 5th toe admitted for worsening pain and redness at the surgical wound site despite starting OP abx.       Interval Problem Update  9/9: MRI shows osteo of 4th and 5th MT head. S/P  I&D.  I discussed with [place].  9/10: Consulted ID, RAY pedersen due to MSSA in cx.  Painful overnight, better this AM.  Blood cx negative so far.  9/11: Pending final surgical cultures.  Tolerating Unasyn, Bactrim.  Pain is better controlled today.  Low sugar overnight, Lantus held    Consultants/Specialty  Dr. Mallory - Ortho  Dr. Haddad - ID    Code Status  Full Code    Disposition  Home w IV abx.    Review of Systems  Review of Systems   Constitutional: Negative for chills, fever and malaise/fatigue.   HENT: Negative for sore throat.    Respiratory: Negative for cough and shortness of breath.    Cardiovascular: Negative for chest pain and palpitations.   Gastrointestinal: Negative for abdominal pain, blood in stool, diarrhea, heartburn, nausea and vomiting.   Genitourinary: Negative for dysuria and frequency.   Musculoskeletal: Positive for joint pain (controlled). Negative for back pain and myalgias.   Neurological: Negative for dizziness, focal weakness, weakness and headaches.   Psychiatric/Behavioral: Negative for depression and hallucinations.   All other systems reviewed and are negative.       Physical Exam  Temp:  [36.5 °C (97.7 °F)-36.8 °C (98.2 °F)] 36.8 °C (98.2 °F)  Pulse:  [83-98] 89  Resp:  [18-20] 18  BP: (125-152)/(73-84) 131/82  SpO2:  [93 %-98 %] 97 %    Physical Exam  Constitutional:       Appearance: Normal appearance.      Comments: Sitting up in chair, very pleasant   HENT:      Head: Normocephalic and atraumatic.      Nose:  Nose normal.      Mouth/Throat:      Mouth: Mucous membranes are moist.   Eyes:      Extraocular Movements: Extraocular movements intact.      Pupils: Pupils are equal, round, and reactive to light.   Neck:      Musculoskeletal: Normal range of motion and neck supple.   Cardiovascular:      Rate and Rhythm: Normal rate and regular rhythm.      Heart sounds: No murmur.   Pulmonary:      Effort: Pulmonary effort is normal. No respiratory distress.      Breath sounds: Normal breath sounds. No stridor.   Abdominal:      General: Abdomen is flat. Bowel sounds are normal. There is no distension.      Palpations: Abdomen is soft.      Tenderness: There is no abdominal tenderness.   Musculoskeletal:         General: Tenderness present. No deformity.   Skin:     General: Skin is warm.      Comments: Dressing c/d/i, R foot   Neurological:      General: No focal deficit present.      Mental Status: He is alert and oriented to person, place, and time. Mental status is at baseline.   Psychiatric:         Mood and Affect: Mood normal.         Behavior: Behavior normal.         Thought Content: Thought content normal.         Fluids    Intake/Output Summary (Last 24 hours) at 9/11/2020 1304  Last data filed at 9/11/2020 0800  Gross per 24 hour   Intake 360 ml   Output --   Net 360 ml       Laboratory  Recent Labs     09/09/20  0331 09/10/20  0231 09/11/20  0452   WBC 10.0 13.5* 8.8   RBC 5.24 4.67* 4.66*   HEMOGLOBIN 15.8 14.2 14.2   HEMATOCRIT 47.7 41.5* 42.2   MCV 91.0 88.9 90.6   MCH 30.2 30.4 30.5   MCHC 33.1* 34.2 33.6*   RDW 42.5 42.3 42.2   PLATELETCT 248 267 264   MPV 9.9 9.8 9.9     Recent Labs     09/09/20  0331 09/10/20  0231 09/11/20  0452   SODIUM 140 139 139   POTASSIUM 4.1 3.7 4.5   CHLORIDE 105 103 105   CO2 22 24 25   GLUCOSE 81 76 93   BUN 14 13 13   CREATININE 0.79 0.92 1.00   CALCIUM 8.9 8.5 8.7             Recent Labs     09/09/20  0331   TRIGLYCERIDE 224*   HDL 30*   *       Imaging  MR-FOOT-W/O RIGHT    Final Result      1.  Prior 5th digit amputation      2.  Osteomyelitis of the 5th and 4th metatarsal heads      US-AGUSTINA SINGLE LEVEL BILAT   Final Result           Assessment/Plan  * Diabetic foot infection (HCC)- (present on admission)  Assessment & Plan  On 8/6/2020 patient had his fifth right toe amputated due to diabetic infection and ischemic emboli to foot (no source was identified).  He was discharged on ASA and abx as well as insulin/lantus.  Redness improved but returned when antibiotics were discontinued therefore outpatient antibiotics were represcribed.  Despite this, redness worsened again and he developed a dark eschar over his surgical site  Sent here by ortho  MRI shows osteo of 4th and 5th MT head s/p debridement on 9/9/20  Repeat AGUSTINA shows no e/o plaque or ischemia  Discussed with ID - will need 6 weeks abx/outpatient infusion  Wound cx growing MSSA, previous w MSSA plus Acinetobacter  Place PICC when ok with ID  Continue Unasyn (MSSA plus anaerobes) + Bactrim (acinetobacter)    Leukocytosis  Assessment & Plan  Resolved    Essential hypertension- (present on admission)  Assessment & Plan  Optimize blood pressure management keep systolic blood pressure less than 140 diastolic under 90.  Continue lisinopril    Type 2 diabetes mellitus with hyperglycemia, with long-term current use of insulin (HCC)- (present on admission)  Assessment & Plan  Repeat A1C improved 8.5 from 10.5.  He had blood sugar less than 100 last night therefore Lantus was held.  Sugars still well controlled this morning therefore his Lantus dose may be too high  -Diabetic diet/education  -Continue lantus decreased to 10 units nightly plus SSI  Goal sugar is <150 to facilitate healing    Class 1 obesity due to excess calories without serious comorbidity with body mass index (BMI) of 33.0 to 33.9 in adult- (present on admission)  Assessment & Plan  Body mass index is 33.11 kg/m².  Outpatient weight loss management program  encouraged       VTE prophylaxis: Lovenox

## 2020-09-11 NOTE — PROGRESS NOTES
Received report from day shift nurse. Assumed pt care at 1915. Pt is A&Ox4, resting comfortably in bed. Pt on r.a. No signs of SOB/respiratory distress. Labs noted, VSS. Pt c/o R foot pain 3/10 at this moment. Given tylenol per MAR. Will return for evening meds. Assessment completed. ACE wrap and dry gauze to R foot CDI, able to wiggle toes and dorsi/plantar flex bilaterally. All extremities warm, + pulse, patient denies any numbness or tingling. Fall precautions in place. Wife at bedside. Bed at lowest position. Call light and personal belongings within reach. Continue to monitor

## 2020-09-12 VITALS
HEART RATE: 80 BPM | HEIGHT: 67 IN | DIASTOLIC BLOOD PRESSURE: 88 MMHG | BODY MASS INDEX: 33.18 KG/M2 | RESPIRATION RATE: 16 BRPM | OXYGEN SATURATION: 92 % | TEMPERATURE: 98 F | SYSTOLIC BLOOD PRESSURE: 145 MMHG | WEIGHT: 211.42 LBS

## 2020-09-12 DIAGNOSIS — L08.9 DIABETIC FOOT INFECTION (HCC): ICD-10-CM

## 2020-09-12 DIAGNOSIS — E11.628 DIABETIC FOOT INFECTION (HCC): ICD-10-CM

## 2020-09-12 LAB
ALBUMIN SERPL BCP-MCNC: 3.5 G/DL (ref 3.2–4.9)
BASOPHILS # BLD AUTO: 1.1 % (ref 0–1.8)
BASOPHILS # BLD: 0.1 K/UL (ref 0–0.12)
BUN SERPL-MCNC: 13 MG/DL (ref 8–22)
CALCIUM SERPL-MCNC: 9.1 MG/DL (ref 8.4–10.2)
CHLORIDE SERPL-SCNC: 105 MMOL/L (ref 96–112)
CO2 SERPL-SCNC: 20 MMOL/L (ref 20–33)
CREAT SERPL-MCNC: 1.04 MG/DL (ref 0.5–1.4)
EOSINOPHIL # BLD AUTO: 0.3 K/UL (ref 0–0.51)
EOSINOPHIL NFR BLD: 3.3 % (ref 0–6.9)
ERYTHROCYTE [DISTWIDTH] IN BLOOD BY AUTOMATED COUNT: 42.8 FL (ref 35.9–50)
GLUCOSE BLD-MCNC: 99 MG/DL (ref 65–99)
GLUCOSE SERPL-MCNC: 105 MG/DL (ref 65–99)
HCT VFR BLD AUTO: 44.2 % (ref 42–52)
HGB BLD-MCNC: 15.1 G/DL (ref 14–18)
IMM GRANULOCYTES # BLD AUTO: 0.04 K/UL (ref 0–0.11)
IMM GRANULOCYTES NFR BLD AUTO: 0.4 % (ref 0–0.9)
LYMPHOCYTES # BLD AUTO: 2.35 K/UL (ref 1–4.8)
LYMPHOCYTES NFR BLD: 25.5 % (ref 22–41)
MAGNESIUM SERPL-MCNC: 2 MG/DL (ref 1.5–2.5)
MCH RBC QN AUTO: 30.4 PG (ref 27–33)
MCHC RBC AUTO-ENTMCNC: 34.2 G/DL (ref 33.7–35.3)
MCV RBC AUTO: 89.1 FL (ref 81.4–97.8)
MONOCYTES # BLD AUTO: 0.89 K/UL (ref 0–0.85)
MONOCYTES NFR BLD AUTO: 9.6 % (ref 0–13.4)
NEUTROPHILS # BLD AUTO: 5.55 K/UL (ref 1.82–7.42)
NEUTROPHILS NFR BLD: 60.1 % (ref 44–72)
NRBC # BLD AUTO: 0 K/UL
NRBC BLD-RTO: 0 /100 WBC
PHOSPHATE SERPL-MCNC: 3.1 MG/DL (ref 2.5–4.5)
PLATELET # BLD AUTO: 284 K/UL (ref 164–446)
PMV BLD AUTO: 9.6 FL (ref 9–12.9)
POTASSIUM SERPL-SCNC: 4.6 MMOL/L (ref 3.6–5.5)
RBC # BLD AUTO: 4.96 M/UL (ref 4.7–6.1)
SODIUM SERPL-SCNC: 138 MMOL/L (ref 135–145)
WBC # BLD AUTO: 9.2 K/UL (ref 4.8–10.8)

## 2020-09-12 PROCEDURE — 700105 HCHG RX REV CODE 258: Performed by: HOSPITALIST

## 2020-09-12 PROCEDURE — 82962 GLUCOSE BLOOD TEST: CPT

## 2020-09-12 PROCEDURE — 700102 HCHG RX REV CODE 250 W/ 637 OVERRIDE(OP): Performed by: HOSPITALIST

## 2020-09-12 PROCEDURE — 700102 HCHG RX REV CODE 250 W/ 637 OVERRIDE(OP): Performed by: INTERNAL MEDICINE

## 2020-09-12 PROCEDURE — 85025 COMPLETE CBC W/AUTO DIFF WBC: CPT

## 2020-09-12 PROCEDURE — 99232 SBSQ HOSP IP/OBS MODERATE 35: CPT | Performed by: INTERNAL MEDICINE

## 2020-09-12 PROCEDURE — 700111 HCHG RX REV CODE 636 W/ 250 OVERRIDE (IP): Performed by: HOSPITALIST

## 2020-09-12 PROCEDURE — 36415 COLL VENOUS BLD VENIPUNCTURE: CPT

## 2020-09-12 PROCEDURE — A9270 NON-COVERED ITEM OR SERVICE: HCPCS | Performed by: HOSPITALIST

## 2020-09-12 PROCEDURE — A9270 NON-COVERED ITEM OR SERVICE: HCPCS | Performed by: INTERNAL MEDICINE

## 2020-09-12 PROCEDURE — 80069 RENAL FUNCTION PANEL: CPT

## 2020-09-12 PROCEDURE — 99239 HOSP IP/OBS DSCHRG MGMT >30: CPT | Performed by: INTERNAL MEDICINE

## 2020-09-12 PROCEDURE — 83735 ASSAY OF MAGNESIUM: CPT

## 2020-09-12 RX ORDER — ASPIRIN 81 MG/1
81 TABLET ORAL 2 TIMES DAILY
Qty: 60 TAB | Refills: 0 | Status: SHIPPED | OUTPATIENT
Start: 2020-09-12 | End: 2020-10-12

## 2020-09-12 RX ORDER — LISINOPRIL 5 MG/1
5 TABLET ORAL DAILY
Qty: 30 TAB | Refills: 0 | Status: SHIPPED | OUTPATIENT
Start: 2020-09-12 | End: 2021-04-19 | Stop reason: SDUPTHER

## 2020-09-12 RX ORDER — INSULIN GLARGINE 100 [IU]/ML
5 INJECTION, SOLUTION SUBCUTANEOUS EVERY EVENING
Status: DISCONTINUED | OUTPATIENT
Start: 2020-09-12 | End: 2020-09-12 | Stop reason: HOSPADM

## 2020-09-12 RX ORDER — DEXTROSE MONOHYDRATE 25 G/50ML
50 INJECTION, SOLUTION INTRAVENOUS
Status: DISCONTINUED | OUTPATIENT
Start: 2020-09-12 | End: 2020-09-12 | Stop reason: HOSPADM

## 2020-09-12 RX ORDER — AMOXICILLIN AND CLAVULANATE POTASSIUM 875; 125 MG/1; MG/1
1 TABLET, FILM COATED ORAL 2 TIMES DAILY
Qty: 76 TAB | Refills: 0 | Status: SHIPPED | OUTPATIENT
Start: 2020-09-12 | End: 2020-10-20

## 2020-09-12 RX ORDER — SULFAMETHOXAZOLE AND TRIMETHOPRIM 800; 160 MG/1; MG/1
2 TABLET ORAL EVERY 12 HOURS
Qty: 152 TAB | Refills: 0 | Status: SHIPPED
Start: 2020-09-12 | End: 2020-10-20

## 2020-09-12 RX ORDER — SULFAMETHOXAZOLE AND TRIMETHOPRIM 800; 160 MG/1; MG/1
2 TABLET ORAL EVERY 12 HOURS
Status: DISCONTINUED | OUTPATIENT
Start: 2020-09-12 | End: 2020-09-12 | Stop reason: HOSPADM

## 2020-09-12 RX ORDER — INSULIN GLARGINE 100 [IU]/ML
5 INJECTION, SOLUTION SUBCUTANEOUS EVERY EVENING
Qty: 3 ML | Refills: 0 | Status: SHIPPED | OUTPATIENT
Start: 2020-09-12 | End: 2020-10-12

## 2020-09-12 RX ADMIN — SULFAMETHOXAZOLE AND TRIMETHOPRIM 1 TABLET: 800; 160 TABLET ORAL at 05:43

## 2020-09-12 RX ADMIN — AMPICILLIN SODIUM AND SULBACTAM SODIUM 3 G: 2; 1 INJECTION, POWDER, FOR SOLUTION INTRAMUSCULAR; INTRAVENOUS at 12:00

## 2020-09-12 RX ADMIN — HEPARIN SODIUM 5000 UNITS: 5000 INJECTION, SOLUTION INTRAVENOUS; SUBCUTANEOUS at 05:43

## 2020-09-12 RX ADMIN — AMPICILLIN SODIUM AND SULBACTAM SODIUM 3 G: 2; 1 INJECTION, POWDER, FOR SOLUTION INTRAMUSCULAR; INTRAVENOUS at 05:44

## 2020-09-12 RX ADMIN — LISINOPRIL 5 MG: 5 TABLET ORAL at 05:43

## 2020-09-12 RX ADMIN — ASPIRIN 81 MG: 81 TABLET, COATED ORAL at 05:43

## 2020-09-12 ASSESSMENT — ENCOUNTER SYMPTOMS
FEVER: 0
FOCAL WEAKNESS: 0
COUGH: 0
SORE THROAT: 0
WEAKNESS: 0
DIZZINESS: 0
DIARRHEA: 0
MYALGIAS: 1
MYALGIAS: 0
HALLUCINATIONS: 0
BLOOD IN STOOL: 0
HEARTBURN: 0
ABDOMINAL PAIN: 0
SHORTNESS OF BREATH: 0
CHILLS: 0
BACK PAIN: 0
HEADACHES: 0
NAUSEA: 0
DEPRESSION: 0
PALPITATIONS: 0
VOMITING: 0

## 2020-09-12 NOTE — WOUND TEAM
[facility]  Inpatient Services  Initial Wound and Skin Care Progress    Admission Date: 9/8/2020     Last order of IP CONSULT TO WOUND CARE was found on 9/8/2020 from Hospital Encounter on 9/8/2020     HPI, PMH, SH: Reviewed    Unit where seen by Wound Team: 2209/02     WOUND CONSULT/FOLLOW UP RELATED TO:  Right 4th and 5th toe f/u    Self Report / Pain Level: denies     OBJECTIVE:  Ace wrap dsg in place. Soft roll foam not intact.     WOUND TYPE, LOCATION, CHARACTERISTICS (Pressure Injuries: location, stage, POA or date identified)        Wound 09/08/20 Diabetic Ulcer Toe, 4th Right POA (Active)   Wound Image   09/12/20 0757   Site Assessment Brown;Black    Periwound Assessment Intact    Margins Attached edges    Closure None    Drainage Amount None    Drainage Description Sanguineous    Treatments Cleansed;Site care    Wound Cleansing Normal Saline Irrigation    Periwound Protectant Skin Protectant Wipes to Periwound    Dressing Cleansing/Solutions 3% Betadine    Dressing Options Open to Air    Dressing Changed New    Dressing Status Intact    Dressing Change/Treatment Frequency Every Shift, and As Needed    NEXT Dressing Change/Treatment Date 09/12/20    NEXT Weekly Photo (Inpatient Only) 09/19/20    Non-staged Wound Description Not applicable    Wound Length (cm) 1 cm    Wound Width (cm) 1.3 cm    Wound Surface Area (cm^2) 1.3 cm^2    Tunneling (cm) 0 cm    Shape circular    Wound Odor None    Pulses 1+;DP;Right    Exposed Structures None        Wound 09/09/20 Incision Foot Right (Active)   Wound Image   09/12/20 3121   Site Assessment Pink;Red;Yellow    Periwound Assessment Red;Warm    Margins Attached edges    Closure Surface sutures    Drainage Amount Scant    Drainage Description Serosanguineous    Treatments Cleansed;Site care    Wound Cleansing Normal Saline Irrigation    Periwound Protectant Skin Protectant Wipes to Periwound    Dressing Cleansing/Solutions Not Applicable    Dressing  Options Hydrofiber Silver;Nonadhesive Foam;Hypafix Tape;Tubigrip    Dressing Changed Changed    Dressing Status Intact    Dressing Change/Treatment Frequency Every 48 hrs, and As Needed    NEXT Dressing Change/Treatment Date 09/14/20    Non-staged Wound Description Partial thickness    Wound Length (cm) 3.5 cm    Wound Width (cm) 1 cm    Wound Depth (cm) 0 cm    Wound Surface Area (cm^2) 3.5 cm^2    Wound Volume (cm^3) 0 cm^3    Tunneling (cm) 0 cm    Undermining (cm) 0 cm    Shape linear    Wound Odor None    Pulses 1+;DP;Right    Exposed Structures Sutures        Vascular:    AGUSTINA:   No results found.    Lab Values:    Lab Results   Component Value Date/Time    WBC 9.2 09/12/2020 02:44 AM    RBC 4.96 09/12/2020 02:44 AM    HEMOGLOBIN 15.1 09/12/2020 02:44 AM    HEMATOCRIT 44.2 09/12/2020 02:44 AM    CREACTPROT 0.71 09/08/2020 02:22 PM    SEDRATEWES 45 (H) 08/01/2020 05:24 AM    HBA1C 8.3 (H) 09/08/2020 02:22 PM        Culture Results show:  Recent Results (from the past 720 hour(s))   Culture Wound W/ Gram Stain    Collection Time: 09/08/20  3:15 PM    Specimen: Right Foot; Wound   Result Value Ref Range    Significant Indicator POS (POS)     Source WND     Site RIGHT FOOT     Culture Result - (A)     Gram Stain Result Moderate Gram positive cocci.     Culture Result Staphylococcus aureus  Heavy growth   (A)        Susceptibility    Staphylococcus aureus - GILBERTO     Azithromycin <=2 Sensitive mcg/mL     Clindamycin <=0.5 Sensitive mcg/mL     Cefazolin <=8 Sensitive mcg/mL     Ceftaroline <=0.5 Sensitive mcg/mL     Daptomycin <=1 Sensitive mcg/mL     Ampicillin/sulbactam <=8/4 Sensitive mcg/mL     Erythromycin <=0.25 Sensitive mcg/mL     Vancomycin 1 Sensitive mcg/mL     Oxacillin 0.5 Sensitive mcg/mL     Penicillin >8 Resistant mcg/mL     Pip/Tazobactam <=4 Sensitive mcg/mL     Trimeth/Sulfa <=0.5/9.5 Sensitive mcg/mL     Tetracycline <=4 Sensitive mcg/mL       INTERVENTIONS BY WOUND TEAM:  Dressing removed. Wound  and periwound cleansed with saline and gauze. On 5th amp site, applied hydrofiber silver( Aquacel AG) and secured with nonadhesive foam and hypafix tape. 4th toe ulcer left STACI and painted with 3% betadine. Tubi  E applied for continued mild compression.    Interdisciplinary consultation: Patient, Johny RN, Dr. Vásquez    EVALUATION / RATIONALE FOR TREATMENT:     9/12: Pt underwent I&D with direct closure of R 5th toe amp site on 9/9 d/t osteo on 4th and 5th MTH. Wound bed is clean with no odor and scant, serosanguinous drainage. Incision is well approximated. Aquacel Ag Hydrofiber applied to manage bioburden, absorb exudate, and maintain a moist wound environment without laterally wicking exudate therefore reducing stephanie-wound maceration. Eschar cap on 4th toe painted with betadine to help stabilize. Recommend I&D to 4th toe as there is concern removal at bedside with reveal bone. At this time eschar is intact without underlying signs of infection. Pt will need referral to outpatient clinic for ongoing care. Wound team will continue to follow weekly.    9/8:Patient admitted with worsening redness to the patient amputation site on his right 5th toe, and has wound on 4th plantar toe. AGUSTINA's were being completed when this RN was at bedside. Orthopedic and surgeon consulted to assess foot. Right 4th plantar toe and 5th amp site with dried eschar cap, unable to assess depth of wound. Applied betadine 3% to keep area dry and intact. Wound team will follow up later this week after surgery/ortho sees and assess' patient.         Goals: Steady decrease in wound area and depth weekly.    NURSING PLAN OF CARE ORDERS (X):    Dressing changes: See Dressing Care orders: X  Skin care: See Skin Care orders:   Rectal tube care: See Rectal Tube Care orders:   Other orders:        WOUND TEAM PLAN OF CARE:   Dressing changes by wound team:                   Follow up 3 times weekly:                NPWT change 3 times weekly:      Follow up 1-2 times weekly:  X nursing to perform dsg care; WT following  Follow up Bi-Monthly:                   Follow up as needed:       Other (explain):     Anticipated discharge plans:   LTACH:        SNF/Rehab:                  Home Health Care:           Outpatient Wound Center:  X  Self Care:

## 2020-09-12 NOTE — PROGRESS NOTES
Infectious Disease Progress Note    Author: Guru Acuña M.D. Date & Time of service: 2020  10:39 AM    Chief Complaint:  Follow-up for osteomyelitis    Interval History:   afebrile, white count 9.2, tolerating Unasyn and Bactrim.    Labs Reviewed and Medications Reviewed.    Review of Systems:  Review of Systems   Constitutional: Negative for chills and fever.   Gastrointestinal: Negative for abdominal pain, diarrhea, nausea and vomiting.   Musculoskeletal: Positive for joint pain and myalgias.   Skin: Negative for itching and rash.   All other systems reviewed and are negative.      Hemodynamics:  Temp (24hrs), Av.8 °C (98.2 °F), Min:36.7 °C (98.1 °F), Max:36.9 °C (98.4 °F)  Temperature: 36.9 °C (98.4 °F)  Pulse  Av.2  Min: 63  Max: 114   Blood Pressure: 146/79       Physical Exam:  Physical Exam  Vitals signs and nursing note reviewed.   Constitutional:       Appearance: Normal appearance.   HENT:      Head: Normocephalic and atraumatic.      Mouth/Throat:      Mouth: Mucous membranes are moist.      Pharynx: No oropharyngeal exudate.   Eyes:      General:         Right eye: No discharge.         Left eye: No discharge.      Conjunctiva/sclera: Conjunctivae normal.      Pupils: Pupils are equal, round, and reactive to light.   Neck:      Musculoskeletal: No neck rigidity.   Cardiovascular:      Rate and Rhythm: Normal rate.      Heart sounds: No murmur.   Pulmonary:      Effort: Pulmonary effort is normal. No respiratory distress.      Breath sounds: No wheezing.   Abdominal:      General: Abdomen is flat. There is no distension.      Tenderness: There is no abdominal tenderness.   Musculoskeletal:         General: Swelling present.      Comments: Right fifth toe status post amputation, tip of right fourth toe plantar surface with eschar, mild redness and swelling of the fourth toe   Skin:     Findings: Erythema present. No rash.   Neurological:      General: No focal deficit present.       Mental Status: He is alert and oriented to person, place, and time.      Comments: Some decrease sensation bilateral legs   Psychiatric:         Mood and Affect: Mood normal.         Behavior: Behavior normal.      Comments: Very pleasant         Meds:    Current Facility-Administered Medications:   •  insulin glargine  •  insulin regular **AND** POC Blood Glucose **AND** NOTIFY MD and PharmD **AND** glucose **AND** dextrose 50%  •  sulfamethoxazole-trimethoprim  •  aspirin  •  lisinopril  •  acetaminophen  •  labetalol  •  senna-docusate **AND** polyethylene glycol/lytes **AND** magnesium hydroxide **AND** bisacodyl  •  heparin  •  ampicillin-sulbactam (UNASYN) IV  •  ondansetron  •  ondansetron  •  promethazine  •  promethazine  •  prochlorperazine    Labs:  Recent Labs     09/10/20  0231 09/11/20  0452 09/12/20  0244   WBC 13.5* 8.8 9.2   RBC 4.67* 4.66* 4.96   HEMOGLOBIN 14.2 14.2 15.1   HEMATOCRIT 41.5* 42.2 44.2   MCV 88.9 90.6 89.1   MCH 30.4 30.5 30.4   RDW 42.3 42.2 42.8   PLATELETCT 267 264 284   MPV 9.8 9.9 9.6   NEUTSPOLYS 68.00 56.40 60.10   LYMPHOCYTES 20.00* 29.30 25.50   MONOCYTES 8.90 9.90 9.60   EOSINOPHILS 1.90 3.10 3.30   BASOPHILS 0.80 1.00 1.10     Recent Labs     09/10/20  0231 09/11/20  0452 09/12/20  0244   SODIUM 139 139 138   POTASSIUM 3.7 4.5 4.6   CHLORIDE 103 105 105   CO2 24 25 20   GLUCOSE 76 93 105*   BUN 13 13 13     Recent Labs     09/10/20  0231 09/11/20  0452 09/12/20  0244   ALBUMIN 3.3 3.4 3.5   CREATININE 0.92 1.00 1.04       Imaging:  Mr-foot-w/o Right    Result Date: 9/8/2020 9/8/2020 6:15 PM HISTORY/REASON FOR EXAM:  Osteomyelitis suspected, diabetic Diabetic, open wounds/infection over 4th and 5th digits. Evaluate for osteomyelitis TECHNIQUE/EXAM DESCRIPTION: MRI of the RIGHT foot without contrast. The study was performed on a [employer] 1.5 Yolande MRI scanner. T1 axial and sagittal, fast spin-echo T2 fat-suppressed axial and coronal, and fast inversion recovery sagittal  images were obtained. COMPARISON:  None. FINDINGS: There has been previous 5th digit amputation. There appears to be an overlying open wound. BONE AND MARROW: There is mild bone marrow edema within the 5th metatarsal head consistent with osteomyelitis. Also, there is bone marrow edema in the 4th metatarsal head consistent with osteomyelitis. There is 1st metatarsophalangeal joint osteoarthritis with associated subchondral edema in the distal aspect of the 1st proximal phalanx of the proximal aspect of the 1st distal phalanx MUSCLES: The muscular compartment is normal in appearance. LIGAMENTS and TENDONS: The visualized ligament and tendon are normal in appearance. MISCELLANEOUS: No fluid collection or mass.     1.  Prior 5th digit amputation 2.  Osteomyelitis of the 5th and 4th metatarsal heads    Us-jenni Single Level Bilat    Result Date: 2020   Vascular Laboratory  Conclusions  There is no evidence of arterial disease demonstrated (JENNI is .9-1.0).  YUDITH BOATENG  Age:    54    Gender:     M  MRN:    [id number]  :    1965      BSA:  Exam Date:     2020 16:22  Room #:     Inpatient  Priority:     Routine  Ht (in):             Wt (lb):  Ordering Physician:        OLIVA AVALOS  Referring Physician:       [id number]  Sonographer:               Yudith Butler RVT  Study Type:                Complete Bilateral  Technical Quality:         Good  Indications:     Ulceration of LE, Ulcer of other part of foot (toes)  CPT Codes:       47430  ICD Codes:       707.1  707.15  History:         prior exam 20; right 5th toe amputation on 20; ulcer                    on right distal 4th toe  Limitations:                 RIGHT  Waveform            Systolic BPs (mmHg)                             146           Brachial  Triphasic                                Common Femoral  Triphasic                  138           Posterior Tibial  Triphasic                  144            Dorsalis Pedis                                           Peroneal                             0.99          AGUSTINA                                           TBI                       LEFT  Waveform        Systolic BPs (mmHg)                             141           Brachial  Triphasic                                Common Femoral  Triphasic                  166           Posterior Tibial  Triphasic                  156           Dorsalis Pedis                                           Peroneal                             1.14          AGUSTINA                                           TBI  Findings  Doppler waveforms of the common femoral arteries are high amplitude and  triphasic.  Doppler waveforms of the popliteal arteries are high amplitude & triphasic.  Doppler waveforms at the ankle are brisk and triphasic.  Ankle-brachial index is normal bilaterally.  Toe-brachial index (great toe): right TBI is 0.66  left TBI is 0.91  TBI's are normal.  Arterial waveforms were recorded for all toe using PPG's.  Normal PPG waveforms demonstrated in all digits, including the right 4th  toe just proximal to the ulceration.  Jose Alfredo Black MD  (Electronically Signed)  Final Date:      08 September 2020                   19:11      Micro:  Results     Procedure Component Value Units Date/Time    CULTURE WOUND W/ GRAM STAIN [[id number]]  (Abnormal) Collected: 09/09/20 6742    Order Status: Completed Specimen: Wound Updated: 09/12/20 1036     Significant Indicator POS     Source WND     Site Right Fifth Toe     Culture Result -     Gram Stain Result Rare WBCs.  Moderate Gram positive cocci.  Rare Gram positive rods.       Culture Result Staphylococcus aureus  Rare growth  See previous culture for sensitivity report.        Diphtheroids  Light growth        Viridans Streptococcus  Rare growth      Narrative:      Surgery - swabs received    Anaerobic Culture [[id number]] Collected: 09/09/20 0351    Order Status: Completed Specimen: Wound  Updated: 09/12/20 1036     Significant Indicator NEG     Source WND     Site Right Fifth Toe     Culture Result Culture in progress.    Narrative:      Surgery - swabs received    CULTURE TISSUE W/ GRM STAIN [[id number]]  (Abnormal) Collected: 09/09/20 1555    Order Status: Completed Specimen: Bone Updated: 09/12/20 1017     Significant Indicator POS     Source BONE     Site Right Fifth Toe     Culture Result -     Gram Stain Result Rare WBCs.  Rare Gram positive cocci.       Culture Result Staphylococcus aureus  Rare growth  See previous culture for sensitivity report.        Diphtheroids  Light growth        Viridans Streptococcus  Rare growth      Narrative:      1 toe body fliuid  2 to bone  Surgery Specimen    Anaerobic Culture [[id number]] Collected: 09/09/20 1555    Order Status: Completed Specimen: Bone Updated: 09/12/20 1017     Significant Indicator NEG     Source BONE     Site Right Fifth Toe     Culture Result Culture in progress.    Narrative:      1 toe body fliuid  2 to bone  Surgery Specimen    GRAM STAIN [[id number]] Collected: 09/09/20 1555    Order Status: Completed Specimen: Wound Updated: 09/10/20 1114     Significant Indicator .     Source WND     Site Right Fifth Toe     Gram Stain Result Rare WBCs.  Moderate Gram positive cocci.  Rare Gram positive rods.      Narrative:      Surgery - swabs received    GRAM STAIN [[id number]] Collected: 09/09/20 1555    Order Status: Completed Specimen: Bone Updated: 09/10/20 1114     Significant Indicator .     Source BONE     Site Right Fifth Toe     Gram Stain Result Rare WBCs.  Rare Gram positive cocci.      Narrative:      1 toe body fliuid  2 to bone  Surgery Specimen    Culture Wound W/ Gram Stain [[id number]]  (Abnormal)  (Susceptibility) Collected: 09/08/20 1515    Order Status: Completed Specimen: Wound from Right Foot Updated: 09/10/20 1024     Significant Indicator POS     Source WND     Site RIGHT FOOT     Culture Result -     Gram Stain Result  "Moderate Gram positive cocci.     Culture Result Staphylococcus aureus  Heavy growth      Susceptibility     Staphylococcus aureus (1)     Antibiotic Interpretation Microscan Method Status    Azithromycin Sensitive <=2 mcg/mL GILBERTO Final    Clindamycin Sensitive <=0.5 mcg/mL GILBERTO Final    Cefazolin Sensitive <=8 mcg/mL GILBERTO Final    Ceftaroline Sensitive <=0.5 mcg/mL GILBERTO Final    Daptomycin Sensitive <=1 mcg/mL GILBERTO Final    Ampicillin/sulbactam Sensitive <=8/4 mcg/mL GILBERTO Final    Erythromycin Sensitive <=0.25 mcg/mL GILBERTO Final    Vancomycin Sensitive 1 mcg/mL GILBERTO Final    Oxacillin Sensitive 0.5 mcg/mL GILBEROT Final    Penicillin Resistant >8 mcg/mL GILBERTO Final    Pip/Tazobactam Sensitive <=4 mcg/mL GILBERTO Final    Trimeth/Sulfa Sensitive <=0.5/9.5 mcg/mL GILBERTO Final    Tetracycline Sensitive <=4 mcg/mL GILBERTO Final                   GRAM STAIN [[id number]] Collected: 09/08/20 1515    Order Status: Completed Specimen: Wound Updated: 09/09/20 1059     Significant Indicator .     Source WND     Site RIGHT FOOT     Gram Stain Result Moderate Gram positive cocci.    BLOOD CULTURE [[id number]] Collected: 09/08/20 1529    Order Status: Completed Specimen: Blood from Peripheral Updated: 09/09/20 0709     Significant Indicator NEG     Source BLD     Site PERIPHERAL     Culture Result No Growth  Note: Blood cultures are incubated for 5 days and  are monitored continuously.Positive blood cultures  are called to the RN and reported as soon as  they are identified.      Narrative:      Per Hospital Policy: Only change Specimen Src: to \"Line\" if  specified by physician order.  Right Hand    BLOOD CULTURE [[id number]] Collected: 09/08/20 1422    Order Status: Completed Specimen: Blood from Peripheral Updated: 09/09/20 0709     Significant Indicator NEG     Source BLD     Site PERIPHERAL     Culture Result No Growth  Note: Blood cultures are incubated for 5 days and  are monitored continuously.Positive blood cultures  are called to the RN and reported " "as soon as  they are identified.      Narrative:      Per Hospital Policy: Only change Specimen Src: to \"Line\" if  specified by physician order.  Left Forearm/Arm    SARS-CoV-2, PCR (In-House) [[id number]] Collected: 09/08/20 1600    Order Status: Completed Updated: 09/09/20 0646     SARS-CoV-2 Source NP Swab     SARS-CoV-2 by PCR NotDetected     Comment: [facility] providers: PLEASE REFER TO DE-ESCALATION AND RETESTING PROTOCOL  on [email or web address]  **The [employer]It GeneXpert Xpress SARS-CoV-2 Test has been made available for  use under the Emergency Use Authorization (EUA) only.         Narrative:      Collected By:[id number] HARRY MOSLEY  Is patient being admitted?->Yes  Does this patient meet criteria for Rush/Cepheid per [facility]  Inpatient Workflow? (See workflow link below)->No  Expected turn around time?->Routine (In-House PCR up to 24  hours)  Is this the patients First SARS CoV-2 test?->Unknown  Is this patient employed in healthcare?->Unknown  Is the patient symptomatic as defined by the CDC?->Unknown  Is the patient hospitalized?->Unknown  Is the patient a resident in a congregate care  setting?->Unknown  Is the patient pregnant?->Unknown    Routine (COVID/SARS COV-2 In-House PCR up to 24 hours) [[id number]] Collected: 09/08/20 1600    Order Status: Completed Specimen: Respirate from Nasopharyngeal Updated: 09/09/20 0553     COVID Order Status Received     Comment: The order for SARS CoV-2 testing has been received by the  Laboratory. This result is neither positive nor negative.  Final results of testing will report in 24-48 hours, separately.         Narrative:      Collected By:[id number] HARRY MOSLEY  Is patient being admitted?->Yes  Does this patient meet criteria for Rush/Cepheid per [facility]  Inpatient Workflow? (See workflow link below)->No  Expected turn around time?->Routine (In-House PCR up to 24  hours)  Is this the patients First SARS CoV-2 test?->Unknown  Is this patient employed in " healthcare?->Unknown  Is the patient symptomatic as defined by the CDC?->Unknown  Is the patient hospitalized?->Unknown  Is the patient a resident in a congregate care  setting?->Unknown  Is the patient pregnant?->Unknown    CULTURE WOUND W/ GRAM STAIN [[id number]] Collected: 09/08/20 0000    Order Status: Canceled Specimen: Other from Right Foot           Assessment:  54-year-old poorly controlled diabetic male admitted for wound dehiscence and gangrenous infection of his right fifth toe amputation site as well as ischemic-appearing eschar on the distal tip of his   fourth right toe with underlying osteomyelitis.    Plan:  -On 9/9, patient was taken to the OR, underwent debridement with direct closure of the fifth toe amputation site, no abscess was noted  -Patient has been taking better care of her diabetes, improvement in hemoglobin A1c but still high at 8.3  -Patient is on Bactrim to cover prior Acinetobacter from wound about a month ago.  This will be the best choice for this organism, also with very good bone penetration and noninferior to IV antibiotics.  This will also cover Staph aureus (MSSA).  It will not cover the viridans Streptococcus and diphtheroids, thus patient also on Unasyn.  On discharge, can use Augmentin  -Okay to continue IV Unasyn 3 g every 6 hours and p.o. Bactrim 2 DS tabs twice daily while inpatient.  On discharge, can use p.o. Augmentin 875 mg twice daily instead of Unasyn, and continue Bactrim as above.  -Anticipate 6 weeks of antibiotics through 10/20/2020  -Check CBC with differential, CMP, ESR and CRP ~every 2 weeks  -Regardless of antibiotic choice, patient at risk for recurrence of infection given diabetes and gangrenous changes suspicious for vascular insufficiency.  Unclear what path the fourth toe will take. Close clinical monitoring  -Wound care     Discussed with internal medicine, Dr. Kaminski    ID will sign off. Please call with questions.    ID clinic follow-up in 4 weeks

## 2020-09-12 NOTE — PROGRESS NOTES
Hospital Medicine Daily Progress Note    Date of Service  9/12/2020    Chief Complaint  54 y.o. male admitted 9/8/2020 with right toe pain and worsening wound.      Hospital Course    Hx of DM recently started on insulin, recent ishcemic toe injury c/b necrosis/osteo requiring amputation of 5th toe admitted for worsening pain and redness at the surgical wound site despite starting OP abx.       Interval Problem Update  9/9: MRI shows osteo of 4th and 5th MT head. S/P  I&D.  I discussed with [place].  9/10: Consulted ID, RAY pedersen due to MSSA in cx.  Painful overnight, better this AM.  Blood cx negative so far.  9/11: Pending final surgical cultures.  Tolerating Unasyn, Bactrim.  Pain is better controlled today.  Low sugar overnight, Lantus held  9/12: Pain is well controlled, sugars have been  without insulin last 48 hours.  Tolerating Unasyn, Bactrim    Consultants/Specialty  Dr. Mallory - Ortho  Dr. Haddad - ID    Code Status  Full Code    Disposition  Home w IV vs PO abx.    Review of Systems  Review of Systems   Constitutional: Negative for chills, fever and malaise/fatigue.        Energetic, denies pain   HENT: Negative for sore throat.    Respiratory: Negative for cough and shortness of breath.    Cardiovascular: Negative for chest pain and palpitations.   Gastrointestinal: Negative for abdominal pain, blood in stool, diarrhea, heartburn, nausea and vomiting.   Genitourinary: Negative for dysuria and frequency.   Musculoskeletal: Positive for joint pain (controlled). Negative for back pain and myalgias.   Neurological: Negative for dizziness, focal weakness, weakness and headaches.   Psychiatric/Behavioral: Negative for depression and hallucinations.   All other systems reviewed and are negative.       Physical Exam  Temp:  [36.7 °C (98.1 °F)-36.9 °C (98.4 °F)] 36.9 °C (98.4 °F)  Pulse:  [63-84] 84  Resp:  [18-20] 18  BP: (137-146)/(79-89) 146/79  SpO2:  [93 %-96 %] 96 %    Physical  Exam  Constitutional:       Appearance: Normal appearance. He is not ill-appearing or diaphoretic.      Comments: Sitting up in chair, very pleasant   HENT:      Head: Normocephalic and atraumatic.      Nose: Nose normal.      Mouth/Throat:      Mouth: Mucous membranes are moist.   Eyes:      Extraocular Movements: Extraocular movements intact.      Pupils: Pupils are equal, round, and reactive to light.   Neck:      Musculoskeletal: Normal range of motion and neck supple.   Cardiovascular:      Rate and Rhythm: Normal rate and regular rhythm.      Heart sounds: No murmur.   Pulmonary:      Effort: Pulmonary effort is normal. No respiratory distress.      Breath sounds: Normal breath sounds. No stridor.   Abdominal:      General: Abdomen is flat. Bowel sounds are normal. There is no distension.      Palpations: Abdomen is soft.      Tenderness: There is no abdominal tenderness.   Musculoskeletal:         General: Tenderness (Minimal) present. No deformity.   Skin:     General: Skin is warm.      Comments: Dressing c/d/i, R foot.  Black eschar visible on distal tip of R 4th toe.   Neurological:      General: No focal deficit present.      Mental Status: He is alert and oriented to person, place, and time. Mental status is at baseline.   Psychiatric:         Mood and Affect: Mood normal.         Behavior: Behavior normal.         Thought Content: Thought content normal.         Fluids    Intake/Output Summary (Last 24 hours) at 9/12/2020 1023  Last data filed at 9/11/2020 2353  Gross per 24 hour   Intake 1300 ml   Output --   Net 1300 ml       Laboratory  Recent Labs     09/10/20  0231 09/11/20 0452 09/12/20  0244   WBC 13.5* 8.8 9.2   RBC 4.67* 4.66* 4.96   HEMOGLOBIN 14.2 14.2 15.1   HEMATOCRIT 41.5* 42.2 44.2   MCV 88.9 90.6 89.1   MCH 30.4 30.5 30.4   MCHC 34.2 33.6* 34.2   RDW 42.3 42.2 42.8   PLATELETCT 267 264 284   MPV 9.8 9.9 9.6     Recent Labs     09/10/20  0231 09/11/20 0452 09/12/20  0244   SODIUM 139  139 138   POTASSIUM 3.7 4.5 4.6   CHLORIDE 103 105 105   CO2 24 25 20   GLUCOSE 76 93 105*   BUN 13 13 13   CREATININE 0.92 1.00 1.04   CALCIUM 8.5 8.7 9.1                   Imaging  MR-FOOT-W/O RIGHT   Final Result      1.  Prior 5th digit amputation      2.  Osteomyelitis of the 5th and 4th metatarsal heads      US-AGUSTINA SINGLE LEVEL BILAT   Final Result           Assessment/Plan  * Diabetic foot infection (HCC)- (present on admission)  Assessment & Plan  On 8/6/2020 patient had his fifth right toe amputated due to diabetic infection and ischemic emboli to foot (no source was identified).  He was discharged on ASA and abx as well as insulin/lantus.  Redness improved but returned when antibiotics were discontinued therefore outpatient antibiotics were represcribed.  Despite this, redness worsened again and he developed a dark eschar over his surgical site  Sent here by ortho  MRI shows osteo of 4th and 5th MT head s/p debridement on 9/9/20  Repeat AGUSTINA shows no e/o plaque or ischemia  Discussed with ID - 6 weeks IV vs PO abx pending final cultures.  Wound cx growing MSSA +viridens strep (previous w MSSA plus Acinetobacter)  Hold off on PICC in case PO is an option for outpatient treatment  Continue Unasyn (MSSA plus anaerobes) + Bactrim (acinetobacter)    Leukocytosis  Assessment & Plan  Fluctuating slightly, repeat in AM    Essential hypertension- (present on admission)  Assessment & Plan  Optimize blood pressure management keep systolic blood pressure less than 140 diastolic under 90.  Continue lisinopril    Type 2 diabetes mellitus with hyperglycemia, with long-term current use of insulin (HCC)- (present on admission)  Assessment & Plan  Repeat A1C improved 8.5 from 10.5.  He had blood sugar less than 100 last night therefore Lantus was held again (2nd night in a row).   Hold lantus  Decrease SSI  Goal sugar is <150 to facilitate healing    Class 1 obesity due to excess calories without serious comorbidity with  body mass index (BMI) of 33.0 to 33.9 in adult- (present on admission)  Assessment & Plan  Body mass index is 33.11 kg/m².  Outpatient weight loss management program encouraged       VTE prophylaxis: Lovenox

## 2020-09-12 NOTE — DISCHARGE SUMMARY
Discharge Summary    CHIEF COMPLAINT ON ADMISSION  Chief Complaint   Patient presents with   • Foot Pain     per pt sent by PCP for possible infection in Right Foot       Reason for Admission  Post Op Complications     Admission Date  9/8/2020    CODE STATUS  Full Code    HPI & HOSPITAL COURSE  This is a 54 y.o. male with a Hx of DM recently started on insulin, recent ishcemic toe injury c/b necrosis/osteo requiring amputation of 5th toe, admitted for worsening pain and redness at the surgical wound site despite starting oral OP abx.  He was admitted and started on empiric IV antibiotic therapy.  He underwent an MRI which revealed osteomyelitis of the distal MTP joint of the fourth and fifth digits.  He underwent surgical debridement of his previous surgical wound on 9/9/2020 by Dr. Mallory.  Cultures from his debridement grew MSSA, diphtheroids and strep viridans.  Previous cultures from his initial episode of osteomyelitis also grew Acinetobacter.  Due to the recurrence of his osteomyelitis this will also be treated.  He is compliant with medications therefore determined to be a good candidate for oral therapy with antibiotics for 6 weeks.  He will be discharged to complete oral Augmentin and Bactrim DS 2 tabs twice daily through 10/20/2020.  He will continue to follow-up with Dr. Mallory as an outpatient and get outpatient wound care as needed for his right foot wound    His diabetes was diagnosed only 6 weeks ago where he was found to have an A1c of approximately 10.5.  Since then he has had very good glucose control and his A1c has already dropped 2 points over the last 6 weeks.  He is very compliant with his insulin regimen, in fact, he has lost several pounds and in the hospital was not requiring any insulin to keep his blood sugar less than 120.  He will continue to monitor his blood sugars closely as an outpatient.    Therefore, he is discharged in good and stable condition to home with close outpatient  follow-up.    The patient met 2-midnight criteria for an inpatient stay at the time of discharge.    Discharge Date  9/12/2020    FOLLOW UP ITEMS POST DISCHARGE  Follow-up with Dr. Mallory in approximately 1 week  Continue to follow with ID as needed while on outpatient antibiotics with every 2 week lab evaluation    DISCHARGE DIAGNOSES  Principal Problem:    Diabetic foot infection (HCC) POA: Yes  Active Problems:    Type 2 diabetes mellitus with hyperglycemia, with long-term current use of insulin (HCC) POA: Yes    Essential hypertension POA: Yes    Leukocytosis POA: Unknown    Class 1 obesity due to excess calories without serious comorbidity with body mass index (BMI) of 33.0 to 33.9 in adult POA: Yes  Resolved Problems:    * No resolved hospital problems. *      FOLLOW UP  Future Appointments   Date Time Provider Department Center   9/24/2020  8:20 AM Thiago Kauffman D.O. [facility] None     No follow-up provider specified.    MEDICATIONS ON DISCHARGE     Medication List      START taking these medications      Instructions   amoxicillin-clavulanate 875-125 MG Tabs  Commonly known as: AUGMENTIN   Take 1 Tab by mouth 2 times a day for 38 days.  Dose: 1 Tab     sulfamethoxazole-trimethoprim 800-160 MG tablet  Commonly known as: BACTRIM DS   Take 2 Tabs by mouth every 12 hours for 38 days.  Dose: 2 Tab        CHANGE how you take these medications      Instructions   Lantus SoloStar 100 UNIT/ML Sopn injection  What changed: how much to take  Generic drug: insulin glargine   Inject 5 Units as instructed every evening for 30 days.  Dose: 5 Units        CONTINUE taking these medications      Instructions   aspirin 81 MG EC tablet   Take 1 Tab by mouth 2 Times a Day for 30 days.  Dose: 81 mg     HumaLOG KwikPen 200 UNIT/ML Sopn  Generic drug: Insulin Lispro   Inject 1-6 Units as instructed 2 times a day, with meals. Sliding Scale 151- 200 = 1 unit  201- 250 = 2 units  251-300 = 3 units  301-350 = 4 units  351-400 = 5  units  Over 400 = 6 units  Dose: 1-6 Units     lisinopril 5 MG Tabs  Commonly known as: PRINIVIL   Take 1 Tab by mouth every day for 30 days.  Dose: 5 mg        STOP taking these medications    cephALEXin 500 MG Caps  Commonly known as: KEFLEX            Allergies  No Known Allergies    DIET  Orders Placed This Encounter   Procedures   • Diet Order Diabetic     Standing Status:   Standing     Number of Occurrences:   1     Order Specific Question:   Diet:     Answer:   Diabetic [3]     Order Specific Question:   Calorie modifications:     Answer:   2200 kcals [6]       ACTIVITY  As tolerated.  Weight bearing as tolerated    CONSULTATIONS  Dr. Mallory - Ortho  Dr. Acuña - ID    PROCEDURES  9/9/20: I&D right foot wound, cultures    LABORATORY  Lab Results   Component Value Date    SODIUM 138 09/12/2020    POTASSIUM 4.6 09/12/2020    CHLORIDE 105 09/12/2020    CO2 20 09/12/2020    GLUCOSE 105 (H) 09/12/2020    BUN 13 09/12/2020    CREATININE 1.04 09/12/2020        Lab Results   Component Value Date    WBC 9.2 09/12/2020    HEMOGLOBIN 15.1 09/12/2020    HEMATOCRIT 44.2 09/12/2020    PLATELETCT 284 09/12/2020        Total time of the discharge process exceeds 43 minutes.

## 2020-09-12 NOTE — PROGRESS NOTES
Received report, assumed pt care. Discussed POC. Pt with no c/o. Wound RN at bedside. VSS. Will cont to monitor.

## 2020-09-12 NOTE — DISCHARGE INSTRUCTIONS
Discharge Instructions    Discharged to home by car with relative. Discharged via wheelchair, hospital escort: Yes.  Special equipment needed: Not Applicable    Be sure to schedule a follow-up appointment with your primary care doctor or any specialists as instructed.     Discharge Plan:        I understand that a diet low in cholesterol, fat, and sodium is recommended for good health. Unless I have been given specific instructions below for another diet, I accept this instruction as my diet prescription.   Other diet: diabetic    Special Instructions: None    · Is patient discharged on Warfarin / Coumadin?   No     Depression / Suicide Risk    As you are discharged from this [facility] facility, it is important to learn how to keep safe from harming yourself.    Recognize the warning signs:  · Abrupt changes in personality, positive or negative- including increase in energy   · Giving away possessions  · Change in eating patterns- significant weight changes-  positive or negative  · Change in sleeping patterns- unable to sleep or sleeping all the time   · Unwillingness or inability to communicate  · Depression  · Unusual sadness, discouragement and loneliness  · Talk of wanting to die  · Neglect of personal appearance   · Rebelliousness- reckless behavior  · Withdrawal from people/activities they love  · Confusion- inability to concentrate     If you or a loved one observes any of these behaviors or has concerns about self-harm, here's what you can do:  · Talk about it- your feelings and reasons for harming yourself  · Remove any means that you might use to hurt yourself (examples: pills, rope, extension cords, firearm)  · Get professional help from the community (Mental Health, Substance Abuse, psychological counseling)  · Do not be alone:Call your Safe Contact- someone whom you trust who will be there for you.  · Call your local CRISIS HOTLINE 515-0017 or 155-131-1622  · Call your local Children's Mobile  Crisis Response Team [place] (139) 474-8041 or www.Exercise.com  · Call the toll free National Suicide Prevention Hotlines   · National Suicide Prevention Lifeline 949-990-VFKR (3818)  · National Hope Line Network 800-SUICIDE (959-2242)        Amoxicillin; Clavulanic Acid tablets  What is this medicine?  AMOXICILLIN; CLAVULANIC ACID (a mox i INEZ in; BENITONASIM fransisco henrietta ic AS id) is a penicillin antibiotic. It is used to treat certain kinds of bacterial infections. It will not work for colds, flu, or other viral infections.  This medicine may be used for other purposes; ask your health care provider or pharmacist if you have questions.  COMMON BRAND NAME(S): Augmentin  What should I tell my health care provider before I take this medicine?  They need to know if you have any of these conditions:  · bowel disease, like colitis  · kidney disease  · liver disease  · mononucleosis  · an unusual or allergic reaction to amoxicillin, penicillin, cephalosporin, other antibiotics, clavulanic acid, other medicines, foods, dyes, or preservatives  · pregnant or trying to get pregnant  · breast-feeding  How should I use this medicine?  Take this medicine by mouth with a full glass of water. Follow the directions on the prescription label. Take at the start of a meal. Do not crush or chew. If the tablet has a score line, you may cut it in half at the score line for easier swallowing. Take your medicine at regular intervals. Do not take your medicine more often than directed. Take all of your medicine as directed even if you think you are better. Do not skip doses or stop your medicine early.  Talk to your pediatrician regarding the use of this medicine in children. Special care may be needed.  Overdosage: If you think you have taken too much of this medicine contact a poison control center or emergency room at once.  NOTE: This medicine is only for you. Do not share this medicine with others.  What if I miss a dose?  If you miss  a dose, take it as soon as you can. If it is almost time for your next dose, take only that dose. Do not take double or extra doses.  What may interact with this medicine?  · allopurinol  · anticoagulants  · birth control pills  · methotrexate  · probenecid  This list may not describe all possible interactions. Give your health care provider a list of all the medicines, herbs, non-prescription drugs, or dietary supplements you use. Also tell them if you smoke, drink alcohol, or use illegal drugs. Some items may interact with your medicine.  What should I watch for while using this medicine?  Tell your doctor or healthcare provider if your symptoms do not improve.  This medicine may cause serious skin reactions. They can happen weeks to months after starting the medicine. Contact your healthcare provider right away if you notice fevers or flu-like symptoms with a rash. The rash may be red or purple and then turn into blisters or peeling of the skin. Or, you might notice a red rash with swelling of the face, lips or lymph nodes in your neck or under your arms.  Do not treat diarrhea with over the counter products. Contact your doctor if you have diarrhea that lasts more than 2 days or if it is severe and watery.  If you have diabetes, you may get a false-positive result for sugar in your urine. Check with your doctor or healthcare provider.  Birth control pills may not work properly while you are taking this medicine. Talk to your doctor about using an extra method of birth control.  What side effects may I notice from receiving this medicine?  Side effects that you should report to your doctor or health care professional as soon as possible:  · allergic reactions like skin rash, itching or hives, swelling of the face, lips, or tongue  · breathing problems  · dark urine  · fever or chills, sore throat  · redness, blistering, peeling, or loosening of the skin, including inside the mouth  · seizures  · trouble passing  urine or change in the amount of urine  · unusual bleeding, bruising  · unusually weak or tired  · white patches or sores in the mouth or throat  Side effects that usually do not require medical attention (report to your doctor or health care professional if they continue or are bothersome):  · diarrhea  · dizziness  · headache  · nausea, vomiting  · stomach upset  · vaginal or anal irritation  This list may not describe all possible side effects. Call your doctor for medical advice about side effects. You may report side effects to FDA at 2-319-FLW-4751.  Where should I keep my medicine?  Keep out of the reach of children.  Store at room temperature below 25 degrees C (77 degrees F). Keep container tightly closed. Throw away any unused medicine after the expiration date.  NOTE: This sheet is a summary. It may not cover all possible information. If you have questions about this medicine, talk to your doctor, pharmacist, or health care provider.  © 2020 [employer] (2020-03-02 09:43:46)      Sulfamethoxazole; Trimethoprim, SMX-TMP tablets  What is this medicine?  SULFAMETHOXAZOLE; TRIMETHOPRIM or SMX-TMP (suhl fuh meth OK joan zohl; trye METH oh prim) is a combination of a sulfonamide antibiotic and a second antibiotic, trimethoprim. It is used to treat or prevent certain kinds of bacterial infections. It will not work for colds, flu, or other viral infections.  This medicine may be used for other purposes; ask your health care provider or pharmacist if you have questions.  COMMON BRAND NAME(S): Bacter-Aid DS, Bactrim, Bactrim DS, Septra, Septra DS  What should I tell my health care provider before I take this medicine?  They need to know if you have any of these conditions:  · anemia  · asthma  · being treated with anticonvulsants  · if you frequently drink alcohol containing drinks  · kidney disease  · liver disease  · low level of folic acid or glucose-6-phosphate dehydrogenase  · poor nutrition or  malabsorption  · porphyria  · severe allergies  · thyroid disorder  · an unusual or allergic reaction to sulfamethoxazole, trimethoprim, sulfa drugs, other medicines, foods, dyes, or preservatives  · pregnant or trying to get pregnant  · breast-feeding  How should I use this medicine?  Take this medicine by mouth with a full glass of water. Follow the directions on the prescription label. Take your medicine at regular intervals. Do not take it more often than directed. Do not skip doses or stop your medicine early.  Talk to your pediatrician regarding the use of this medicine in children. Special care may be needed. This medicine has been used in children as young as 2 months of age.  Overdosage: If you think you have taken too much of this medicine contact a poison control center or emergency room at once.  NOTE: This medicine is only for you. Do not share this medicine with others.  What if I miss a dose?  If you miss a dose, take it as soon as you can. If it is almost time for your next dose, take only that dose. Do not take double or extra doses.  What may interact with this medicine?  Do not take this medicine with any of the following medications:  · aminobenzoate potassium  · dofetilide  · metronidazole  This medicine may also interact with the following medications:  · ACE inhibitors like benazepril, enalapril, lisinopril, and ramipril  · birth control pills  · cyclosporine  · digoxin  · diuretics  · indomethacin  · medicines for diabetes  · methenamine  · methotrexate  · phenytoin  · potassium supplements  · pyrimethamine  · sulfinpyrazone  · tricyclic antidepressants  · warfarin  This list may not describe all possible interactions. Give your health care provider a list of all the medicines, herbs, non-prescription drugs, or dietary supplements you use. Also tell them if you smoke, drink alcohol, or use illegal drugs. Some items may interact with your medicine.  What should I watch for while using this  medicine?  Tell your doctor or health care professional if your symptoms do not improve. Drink several glasses of water a day to reduce the risk of kidney problems.  Do not treat diarrhea with over the counter products. Contact your doctor if you have diarrhea that lasts more than 2 days or if it is severe and watery.  This medicine can make you more sensitive to the sun. Keep out of the sun. If you cannot avoid being in the sun, wear protective clothing and use a sunscreen. Do not use sun lamps or tanning beds/booths.  What side effects may I notice from receiving this medicine?  Side effects that you should report to your doctor or health care professional as soon as possible:  · allergic reactions like skin rash or hives, swelling of the face, lips, or tongue  · breathing problems  · fever or chills, sore throat  · irregular heartbeat, chest pain  · joint or muscle pain  · pain or difficulty passing urine  · red pinpoint spots on skin  · redness, blistering, peeling or loosening of the skin, including inside the mouth  · unusual bleeding or bruising  · unusually weak or tired  · yellowing of the eyes or skin  Side effects that usually do not require medical attention (report to your doctor or health care professional if they continue or are bothersome):  · diarrhea  · dizziness  · headache  · loss of appetite  · nausea, vomiting  · nervousness  This list may not describe all possible side effects. Call your doctor for medical advice about side effects. You may report side effects to FDA at 7-214-FDA-2324.  Where should I keep my medicine?  Keep out of the reach of children.  Store at room temperature between 20 to 25 degrees C (68 to 77 degrees F). Protect from light. Throw away any unused medicine after the expiration date.  NOTE: This sheet is a summary. It may not cover all possible information. If you have questions about this medicine, talk to your doctor, pharmacist, or health care provider.  © 2020  [employer] (2014-07-25 14:38:26)

## 2020-09-12 NOTE — PROGRESS NOTES
Pt given with dc instructions and all questions answered. Provided pt with wound supplies and instructions on dressing changes.

## 2020-09-13 LAB
BACTERIA BLD CULT: NORMAL
BACTERIA BLD CULT: NORMAL
BACTERIA SPEC ANAEROBE CULT: ABNORMAL
BACTERIA TISS AEROBE CULT: ABNORMAL
BACTERIA WND AEROBE CULT: ABNORMAL
GRAM STN SPEC: ABNORMAL
GRAM STN SPEC: ABNORMAL
SIGNIFICANT IND 70042: ABNORMAL
SIGNIFICANT IND 70042: NORMAL
SIGNIFICANT IND 70042: NORMAL
SITE SITE: ABNORMAL
SITE SITE: NORMAL
SITE SITE: NORMAL
SOURCE SOURCE: ABNORMAL
SOURCE SOURCE: NORMAL
SOURCE SOURCE: NORMAL

## 2020-09-22 ENCOUNTER — OFFICE VISIT (OUTPATIENT)
Dept: WOUND CARE | Facility: MEDICAL CENTER | Age: 55
End: 2020-09-22
Attending: INTERNAL MEDICINE
Payer: COMMERCIAL

## 2020-09-22 VITALS
RESPIRATION RATE: 16 BRPM | HEART RATE: 98 BPM | DIASTOLIC BLOOD PRESSURE: 72 MMHG | OXYGEN SATURATION: 95 % | TEMPERATURE: 97.6 F | SYSTOLIC BLOOD PRESSURE: 115 MMHG

## 2020-09-22 DIAGNOSIS — T81.89XA NON-HEALING SURGICAL WOUND, INITIAL ENCOUNTER: Primary | ICD-10-CM

## 2020-09-22 DIAGNOSIS — E11.65 TYPE 2 DIABETES MELLITUS WITH HYPERGLYCEMIA, WITHOUT LONG-TERM CURRENT USE OF INSULIN (HCC): ICD-10-CM

## 2020-09-22 DIAGNOSIS — T14.8XXA WOUND INFECTION: ICD-10-CM

## 2020-09-22 DIAGNOSIS — L08.9 WOUND INFECTION: ICD-10-CM

## 2020-09-22 PROCEDURE — 11043 DBRDMT MUSC&/FSCA 1ST 20/<: CPT | Performed by: NURSE PRACTITIONER

## 2020-09-22 PROCEDURE — 11043 DBRDMT MUSC&/FSCA 1ST 20/<: CPT

## 2020-09-22 PROCEDURE — 99214 OFFICE O/P EST MOD 30 MIN: CPT | Mod: 25 | Performed by: NURSE PRACTITIONER

## 2020-09-22 PROCEDURE — 99214 OFFICE O/P EST MOD 30 MIN: CPT

## 2020-09-22 ASSESSMENT — ENCOUNTER SYMPTOMS
SHORTNESS OF BREATH: 0
DOUBLE VISION: 0
VOMITING: 0
BLURRED VISION: 0
NAUSEA: 0
FEVER: 0
WEAKNESS: 0
HEADACHES: 0
DIZZINESS: 0
PALPITATIONS: 0
CHILLS: 0
WEIGHT LOSS: 1
WHEEZING: 0

## 2020-09-22 NOTE — PROGRESS NOTES
Provider Encounter- Full Thickness wound    HISTORY OF PRESENT ILLNESS  Wound History:    START OF CARE IN CLINIC: 9/20/2020    REFERRING PROVIDER: Edith Kaminski D.O.      WOUND- Full Thickness Wound   LOCATION:    HISTORY: Patient was admitted to [facility] on 9/8/2020 patient had a ischemic toe injury with necrosis/osteo-myelitis requiring amputation of the fifth toe he was readmitted due to worsening pain and redness of the surgical wound site despite being initiated on oral antibiotics.  MRI revealed osteomyelitis of the distal MTP joint and of the fourth and fifth digits.  He underwent surgical procedure debridement of previous surgical site by Dr. Mallory.  His wound was cultured while in the hospital and grew out MSSA, diphtheroids and strep viridans.  Prior to discharge she was placed on 6 weeks of oral antibiotics.  This included Augmentin and Bactrim DS through 10/29/2020.  He is a newly diagnosed diabetic recently started on insulin.  His A1c was approximately 10.5.  He apparently had good glucose control over the last 6 weeks and has dropped nearly 2 points in his A1c.  At this time he reports that he is no longer taking insulin because he is controlled by his diet.    Pertinent Medical History: Diabetes type 2, hypertension, osteomyelitis    TOBACCO USE: Former smoker    Patient's problem list, allergies, and current medications reviewed and updated in Epic    Interval History:  9/22/2020: Clinic visit with MALINDA Aponte. Patient states that they are feeling well today.  Patient denies fever, chills, nausea, vomiting, lightheadedness, dizziness, shortness of breath and chest pain.  Removal of sutures.  Underlining tissue nonviable, removed through debridement.           REVIEW OF SYSTEMS:   Review of Systems   Constitutional: Positive for weight loss. Negative for chills and fever.        Intentional weight loss   Eyes: Negative for blurred vision and double vision.    Respiratory: Negative for shortness of breath and wheezing.    Cardiovascular: Negative for chest pain, palpitations and leg swelling.   Gastrointestinal: Negative for nausea and vomiting.   Skin: Negative for itching and rash.        Surgical wound right lateral foot  Eschar Right third toe   Neurological: Negative for dizziness, weakness and headaches.       PHYSICAL EXAMINATION:   /72   Pulse 98   Temp 36.4 °C (97.6 °F)   Resp 16   SpO2 95%     Physical Exam   Constitutional: He is oriented to person, place, and time and well-developed, well-nourished, and in no distress.   HENT:   Head: Normocephalic.   Eyes: Pupils are equal, round, and reactive to light. Conjunctivae are normal.   Cardiovascular: Intact distal pulses.   Pulmonary/Chest: Effort normal and breath sounds normal. He has no wheezes.   Musculoskeletal:         General: Tenderness and edema present.      Comments: Mild edema right foot   Neurological: He is alert and oriented to person, place, and time.   Skin: Skin is warm. No rash noted. No pallor.   Surgical wound right lateral foot  Eschar Right third toe  Significant amount of callus to right foot  Mild erythema   Psychiatric:   Impulsive       WOUND ASSESSMENT       Wound 08/01/20 Diabetic Ulcer Foot Plantar Right between 1-2 toes callus with wound (Active)       Wound 08/06/20 Incision Toe, 5th Right (Active)       Wound 09/08/20 Diabetic Ulcer Toe, 4th Right POA (Active)       Wound 09/09/20 Incision Foot Right (Active)       Wound 09/22/20 Incision Toe, 5th Right R 5th toe amputation site dehisced incision (Active)   Wound Image    09/22/20 1000   Site Assessment Yellow;Pink;Brown 09/22/20 1000   Periwound Assessment Dry;Intact 09/22/20 1000   Margins Attached edges;Defined edges 09/22/20 1000   Closure Secondary intention 09/22/20 1000   Drainage Amount Moderate 09/22/20 1000   Drainage Description Serosanguineous;Proctor 09/22/20 1000   Treatments Cleansed;Provider  debridement;Topical Lidocaine;Other (Comment) 09/22/20 1000   Wound Cleansing Approved Wound Cleanser 09/22/20 1000   Periwound Protectant Barrier Paste;Skin Protectant Wipes to Periwound 09/22/20 1000   Dressing Cleansing/Solutions Normal Saline 09/22/20 1000   Dressing Options Hydrofiber Silver;Hypafix Tape;Nonadhesive Foam 09/22/20 1000   Dressing Changed New 09/22/20 1000   Dressing Status Clean;Dry;Intact 09/22/20 1000   Dressing Change/Treatment Frequency Weekly, and As Needed 09/22/20 1000   Wound Length (cm) 2.2 cm 09/22/20 1000   Wound Width (cm) 1 cm 09/22/20 1000   Wound Depth (cm) 0.1 cm 09/22/20 1000   Wound Surface Area (cm^2) 2.2 cm^2 09/22/20 1000   Wound Volume (cm^3) 0.22 cm^3 09/22/20 1000   Post-Procedure Length (cm) 2.7 cm 09/22/20 1000   Post-Procedure Width (cm) 1.5 cm 09/22/20 1000   Post-Procedure Depth (cm) 1.8 cm 09/22/20 1000   Post-Procedure Surface Area (cm^2) 4.05 cm^2 09/22/20 1000   Post-Procedure Volume (cm^3) 7.29 cm^3 09/22/20 1000   Wound Bed Granulation (%) 40 % 09/22/20 1000   Wound Bed Slough (%) 60 % 09/22/20 1000   Wound Bed Granulation (%) - Post-Procedure 100 % 09/22/20 1000   Tunneling (cm) 0 cm 09/22/20 1000   Undermining (cm) 0 cm 09/22/20 1000   Wound Odor None 09/22/20 1000   Pulses Not palpable;Doppler;DP;PT;Other (Comments) 09/22/20 1000   Right Foot Monofilament 10-point exam (Sensate) 10/10 09/22/20 1000   Exposed Structures Bone 09/22/20 1000       Wound 09/22/20 Diabetic Ulcer Toe, 4th Distal;Plantar Right R distal plantar 4th toe DFU, eschar (Active)   Wound Image   09/22/20 1000   Site Assessment Dry;Brown;Black 09/22/20 1000   Periwound Assessment Clean;Dry;Intact 09/22/20 1000   Margins Attached edges 09/22/20 1000   Closure Secondary intention 09/22/20 1000   Drainage Amount None 09/22/20 1000   Dressing Options Open to Air 09/22/20 1000   Wound Length (cm) 0.6 cm 09/22/20 1000   Wound Width (cm) 1 cm 09/22/20 1000   Wound Depth (cm) 0 cm 09/22/20 1000    Wound Surface Area (cm^2) 0.6 cm^2 09/22/20 1000   Wound Volume (cm^3) 0 cm^3 09/22/20 1000   Wound Bed Eschar (%) 100 % 09/22/20 1000   Wound Odor None 09/22/20 1000   Right Foot Monofilament 10-point exam (Sensate) 10/10 09/22/20 1000   Exposed Structures None 09/22/20 1000            PROCEDURE:   -2% viscous lidocaine applied topically to wound bed for approximately 5 minutes prior to debridement  -Curette used to debride wound bed.  Excisional debridement was performed to remove devitalized tissue until healthy, bleeding tissue was visualized.   Entire surface of wound, 4.05 cm2 debrided.  Tissue debrided into the muscle/fascia layer.    -Bleeding controlled with manual pressure.    -Wound care completed by wound RN, refer to flowsheet  -Patient tolerated the procedure well, without c/o pain or discomfort.       Pertinent Labs and Diagnostics:    Labs:     A1c:   Lab Results   Component Value Date/Time    HBA1C 8.3 (H) 09/08/2020 02:22 PM          IMAGING: MRI Foot Dated 9/8/2020    FINDINGS:     There has been previous 5th digit amputation.     There appears to be an overlying open wound.        BONE AND MARROW: There is mild bone marrow edema within the 5th metatarsal head consistent with osteomyelitis. Also, there is bone marrow edema in the 4th metatarsal head consistent with osteomyelitis.     There is 1st metatarsophalangeal joint osteoarthritis with associated subchondral edema in the distal aspect of the 1st proximal phalanx of the proximal aspect of the 1st distal phalanx     MUSCLES: The muscular compartment is normal in appearance.     LIGAMENTS and TENDONS: The visualized ligament and tendon are normal in appearance.     MISCELLANEOUS: No fluid collection or mass.       VASCULAR STUDIES: US-[place] Single Level    RIGHT      Waveform            Systolic BPs (mmHg)                              146           Brachial   Triphasic                                Common Femoral   Triphasic                   138           Posterior Tibial   Triphasic                  144           Dorsalis Pedis                                            Peroneal                              0.99          [place]                                            TBI                           LEFT   Waveform        Systolic BPs (mmHg)                              141           Brachial   Triphasic                                Common Femoral   Triphasic                  166           Posterior Tibial   Triphasic                  156           Dorsalis Pedis                                            Peroneal                              1.14          [place]                                            TBI    LAST  WOUND CULTURE:  DATE : 9/9/2020      Component 13d ago   Significant Indicator POSPositive (POS)    Source BONE    Site Right Fifth Toe    Culture Result -Abnormal     Culture Result Abnormal   Bacteroides uniformis   Rare growth                       ASSESSMENT AND PLAN:   1. Non-healing surgical wound, initial encounter  Removed sutures nonviable tissue removed with curette  -Excisional debridement of wound in clinic today, medically necessary to promote wound healing.  -Patient to return to clinic weekly for assessment and debridement  -Patient to change dressing 1-2 times per week in between clinic visits   Wound Care: Hydrofiber silver, Hypafix tape, nonadhesive foam    Patient has an eschar To the distal aspect of his fourth right toe was painted with Betadine in clinic today.  Continue to monitor for opening or worsening of wound    2. Type 2 diabetes mellitus with hyperglycemia, without long-term current use of insulin (HCC)  Patient instructed to keep tight control over FBS, <140 for optimal wound healing; Implications of loss of protective sensation (LOPS) discussed with patient, including increased risk for amputation.  Advised to check feet at least daily, moisturize feet, and to always wear protective foot wear, arrange  meticulous regular foot care by podiatrist or [facility]. Pt with good understanding.       3. Wound infection  Wound culture on 9/9/2020+ for bacteroides uniformis  Patient is currently on Bactrim and Augmentin with end date 10/20/2020            PATIENT EDUCATION  - Importance of adequate nutrition for wound healing  -Advised to go to ER for any increased redness, swelling, drainage, or odor, or if patient develops fever, chills, nausea or vomiting.     25 min spent face to face with patient, >50% of time spent counseling, coordinating care, reviewing records, discussing POC, educating patient regarding wound healing and progression.  This time was spent in excess to procedure time.       Please note that this note may have been created using voice recognition software. I have worked with technical experts from [email or web address] to optimize the interface.  I have made every reasonable attempt to correct obvious errors, but there may be errors of grammar and possibly content that I did not discover before finalizing the note.    N

## 2020-09-22 NOTE — PATIENT INSTRUCTIONS
Keep weight off affected area with offloading boot and knee scooter      Should you experience any significant changes in your wound(s), such as infection (redness, swelling, localized heat, increased pain, fever > 101 F, chills) or have any questions regarding your home care instructions, please contact the wound center at [phone]. If after hours, contact your primary care physician or go to the hospital emergency room.   Keep dressing clean, dry and covered while bathing. Only change dressing if it becomes over saturated, soiled or falls off.

## 2020-09-24 ENCOUNTER — OFFICE VISIT (OUTPATIENT)
Dept: MEDICAL GROUP | Facility: LAB | Age: 55
End: 2020-09-24
Payer: COMMERCIAL

## 2020-09-24 VITALS
DIASTOLIC BLOOD PRESSURE: 64 MMHG | WEIGHT: 207.2 LBS | SYSTOLIC BLOOD PRESSURE: 120 MMHG | HEART RATE: 52 BPM | RESPIRATION RATE: 14 BRPM | TEMPERATURE: 97.6 F | OXYGEN SATURATION: 99 % | HEIGHT: 67 IN | BODY MASS INDEX: 32.52 KG/M2

## 2020-09-24 DIAGNOSIS — B37.2 CANDIDAL INTERTRIGO: ICD-10-CM

## 2020-09-24 DIAGNOSIS — Z79.4 TYPE 2 DIABETES MELLITUS WITH HYPERGLYCEMIA, WITH LONG-TERM CURRENT USE OF INSULIN (HCC): ICD-10-CM

## 2020-09-24 DIAGNOSIS — E11.65 TYPE 2 DIABETES MELLITUS WITH HYPERGLYCEMIA, WITH LONG-TERM CURRENT USE OF INSULIN (HCC): ICD-10-CM

## 2020-09-24 DIAGNOSIS — E55.9 VITAMIN D DEFICIENCY: ICD-10-CM

## 2020-09-24 PROCEDURE — 99214 OFFICE O/P EST MOD 30 MIN: CPT | Performed by: INTERNAL MEDICINE

## 2020-09-24 RX ORDER — KETOCONAZOLE 20 MG/G
0.5 CREAM TOPICAL DAILY
Qty: 30 G | Refills: 1 | Status: SHIPPED
Start: 2020-09-24 | End: 2021-07-20

## 2020-09-24 ASSESSMENT — FIBROSIS 4 INDEX: FIB4 SCORE: 0.76

## 2020-09-24 NOTE — PROGRESS NOTES
CC: Mc Eugene is a 55 y.o. male is suffering from   Chief Complaint   Patient presents with   • Diabetes         SUBJECTIVE:  1. Vitamin D deficiency  Cristopher is here for follow-up, has a history of vitamin D deficiency we will recheck levels    2. Candidal intertrigo  Patient with Candida intertrigo underneath both axilla bilaterally.  Patient is additionally on high-dose antibiotics because of osteomyelitis    3. Type 2 diabetes mellitus with hyperglycemia, with long-term current use of insulin (HCC)  Type 2 diabetes with good control, patient has stopped insulin.  Warned the patient should his blood sugars start to go up he will need to be on insulin again.        Past social, family, history: , , works at [employer]  Social History     Tobacco Use   • Smoking status: Former Smoker   • Smokeless tobacco: Never Used   Substance Use Topics   • Alcohol use: Not Currently   • Drug use: Yes     Comment: marijuana vape         MEDICATIONS:    Current Outpatient Medications:   •  ketoconazole (NIZORAL) 2 % Cream, Apply 0.5 g to affected area(s) every day., Disp: 30 g, Rfl: 1  •  aspirin 81 MG EC tablet, Take 1 Tab by mouth 2 Times a Day for 30 days., Disp: 60 Tab, Rfl: 0  •  lisinopril (PRINIVIL) 5 MG Tab, Take 1 Tab by mouth every day for 30 days., Disp: 30 Tab, Rfl: 0  •  sulfamethoxazole-trimethoprim (BACTRIM DS) 800-160 MG tablet, Take 2 Tabs by mouth every 12 hours for 38 days., Disp: 152 Tab, Rfl: 0  •  amoxicillin-clavulanate (AUGMENTIN) 875-125 MG Tab, Take 1 Tab by mouth 2 times a day for 38 days., Disp: 76 Tab, Rfl: 0  •  LANTUS SOLOSTAR 100 UNIT/ML Solution Pen-injector injection, Inject 5 Units as instructed every evening for 30 days. (Patient not taking: Reported on 9/24/2020), Disp: 3 mL, Rfl: 0  •  Insulin Lispro (HUMALOG KWIKPEN) 200 UNIT/ML Solution Pen-injector, Inject 1-6 Units as instructed 2 times a day, with meals. Sliding Scale 151- 200 = 1 unit 201- 250 = 2 units  "251-300 = 3 units 301-350 = 4 units 351-400 = 5 units Over 400 = 6 units, Disp: , Rfl:     PROBLEMS:  Patient Active Problem List    Diagnosis Date Noted   • Diabetic foot infection (HCC) 08/01/2020     Priority: High   • Leukocytosis 09/08/2020     Priority: Medium   • Essential hypertension 08/03/2020     Priority: Medium   • Type 2 diabetes mellitus with hyperglycemia, with long-term current use of insulin (HCC) 08/01/2020     Priority: Medium   • Class 1 obesity due to excess calories without serious comorbidity with body mass index (BMI) of 33.0 to 33.9 in adult 08/01/2020     Priority: Low       REVIEW OF SYSTEMS:  Gen.:  No Nausea, Vomiting, fever, Chills.  Heart: No chest pain.  Lungs:  No shortness of Breath.  Psychological: Vincenzo unusual Anxiety depression     PHYSICAL EXAM   Constitutional: Alert, cooperative, not in acute distress.  Cardiovascular:  Rate Rhythm is regular without murmurs rubs clicks.     Thorax & Lungs: Clear to auscultation, no wheezing, rhonchi, or rales  HENT: Normocephalic, Atraumatic.  Eyes: PERRLA, EOMI, Conjunctiva normal.   Neck: Trachia is midline no swelling of the thyroid.   Lymphatic: No lymphadenopathy noted.   Neurologic: Alert & oriented x 3, cranial nerves II through XII are intact, Normal motor function, Normal sensory function, No focal deficits noted.   Psychiatric: Affect normal, Judgment normal, Mood normal.     VITAL SIGNS:/64   Pulse (!) 52   Temp 36.4 °C (97.6 °F) (Temporal)   Resp 14   Ht 1.702 m (5' 7\")   Wt 94 kg (207 lb 3.2 oz)   SpO2 99%   BMI 32.45 kg/m²     Labs: Reviewed    Assessment:                                                     Plan:    1. Vitamin D deficiency  Check vitamin D levels  - VITAMIN D,25 HYDROXY; Future    2. Candidal intertrigo  Chart Nizoral 2% cream  - ketoconazole (NIZORAL) 2 % Cream; Apply 0.5 g to affected area(s) every day.  Dispense: 30 g; Refill: 1    3. Type 2 diabetes mellitus with hyperglycemia, with long-term " current use of insulin (HCC)  Clinically stable patient's glucometer readings were reviewed.

## 2020-09-29 ENCOUNTER — OFFICE VISIT (OUTPATIENT)
Dept: WOUND CARE | Facility: MEDICAL CENTER | Age: 55
End: 2020-09-29
Attending: INTERNAL MEDICINE
Payer: COMMERCIAL

## 2020-09-29 VITALS
SYSTOLIC BLOOD PRESSURE: 128 MMHG | OXYGEN SATURATION: 95 % | HEART RATE: 115 BPM | RESPIRATION RATE: 20 BRPM | TEMPERATURE: 97.1 F | DIASTOLIC BLOOD PRESSURE: 80 MMHG

## 2020-09-29 DIAGNOSIS — E11.65 TYPE 2 DIABETES MELLITUS WITH HYPERGLYCEMIA, WITHOUT LONG-TERM CURRENT USE OF INSULIN (HCC): ICD-10-CM

## 2020-09-29 DIAGNOSIS — T14.8XXA WOUND INFECTION: ICD-10-CM

## 2020-09-29 DIAGNOSIS — L08.9 WOUND INFECTION: ICD-10-CM

## 2020-09-29 DIAGNOSIS — T81.89XA NON-HEALING SURGICAL WOUND, INITIAL ENCOUNTER: Primary | ICD-10-CM

## 2020-09-29 PROCEDURE — 11043 DBRDMT MUSC&/FSCA 1ST 20/<: CPT

## 2020-09-29 PROCEDURE — 11043 DBRDMT MUSC&/FSCA 1ST 20/<: CPT | Performed by: NURSE PRACTITIONER

## 2020-09-29 PROCEDURE — 11042 DBRDMT SUBQ TIS 1ST 20SQCM/<: CPT

## 2020-09-29 ASSESSMENT — ENCOUNTER SYMPTOMS
FEVER: 0
BLURRED VISION: 0
NAUSEA: 0
SHORTNESS OF BREATH: 0
WEAKNESS: 0
DOUBLE VISION: 0
WHEEZING: 0
VOMITING: 0
HEADACHES: 0
PALPITATIONS: 0
CHILLS: 0
DIZZINESS: 0
WEIGHT LOSS: 1

## 2020-09-29 NOTE — PROGRESS NOTES
Provider Encounter- Full Thickness wound    HISTORY OF PRESENT ILLNESS  Wound History:    START OF CARE IN CLINIC: 9/20/2020    REFERRING PROVIDER: Edith Kaminski D.O.      WOUND- Full Thickness Wound   LOCATION:    HISTORY: Patient was admitted to [facility] on 9/8/2020 patient had a ischemic toe injury with necrosis/osteo-myelitis requiring amputation of the fifth toe he was readmitted due to worsening pain and redness of the surgical wound site despite being initiated on oral antibiotics.  MRI revealed osteomyelitis of the distal MTP joint and of the fourth and fifth digits.  He underwent surgical procedure debridement of previous surgical site by Dr. Mallory.  His wound was cultured while in the hospital and grew out MSSA, diphtheroids and strep viridans.  Prior to discharge she was placed on 6 weeks of oral antibiotics.  This included Augmentin and Bactrim DS through 10/29/2020.  He is a newly diagnosed diabetic recently started on insulin.  His A1c was approximately 10.5.  He apparently had good glucose control over the last 6 weeks and has dropped nearly 2 points in his A1c.  At this time he reports that he is no longer taking insulin because he is controlled by his diet.    Pertinent Medical History: Diabetes type 2, hypertension, osteomyelitis    TOBACCO USE: Former smoker    Patient's problem list, allergies, and current medications reviewed and updated in Epic    Interval History:  9/22/2020: Clinic visit with MALINDA Aponte. Patient states that they are feeling well today.  Patient denies fever, chills, nausea, vomiting, lightheadedness, dizziness, shortness of breath and chest pain.  Removal of sutures.  Underlining tissue nonviable, removed through debridement.       9/29/2020: Clinic visit with MALINDA Aponte. Patient states that they are feeling well today.  Patient denies fever, chills, nausea, vomiting, lightheadedness, dizziness, shortness of breath and  chest pain.  We will put in for approval for wound VAC and initiate next Tuesday and next clinic appointment if approved.          REVIEW OF SYSTEMS:   Review of Systems   Constitutional: Positive for weight loss. Negative for chills and fever.        Intentional weight loss   Eyes: Negative for blurred vision and double vision.   Respiratory: Negative for shortness of breath and wheezing.    Cardiovascular: Negative for chest pain, palpitations and leg swelling.   Gastrointestinal: Negative for nausea and vomiting.   Skin: Negative for itching and rash.        Surgical wound right lateral foot  Eschar Right third toe   Neurological: Negative for dizziness, weakness and headaches.       PHYSICAL EXAMINATION:   /80   Pulse (!) 115   Temp 36.2 °C (97.1 °F)   Resp 20   SpO2 95%     Physical Exam   Constitutional: He is oriented to person, place, and time and well-developed, well-nourished, and in no distress.   HENT:   Head: Normocephalic.   Eyes: Pupils are equal, round, and reactive to light. Conjunctivae are normal.   Cardiovascular: Intact distal pulses.   Pulmonary/Chest: Effort normal and breath sounds normal. He has no wheezes.   Musculoskeletal:         General: Tenderness present.   Neurological: He is alert and oriented to person, place, and time.   Skin: Skin is warm. No rash noted. No pallor.   Surgical wound right lateral foot  Eschar Right third toe  Significant amount of callus to right foot         WOUND ASSESSMENT        Wound 08/01/20 Diabetic Ulcer Foot Plantar Right between 1-2 toes callus with wound (Active)       Wound 08/06/20 Incision Toe, 5th Right (Active)       Wound 09/08/20 Diabetic Ulcer Toe, 4th Right POA (Active)       Wound 09/09/20 Incision Foot Right (Active)       Wound 09/22/20 Incision Toe, 5th Right R 5th toe amputation site dehisced incision (Active)   Wound Image    09/29/20 0800   Site Assessment Pink;Red;Yellow;White 09/29/20 0800   Periwound Assessment Dry;Intact  09/29/20 0800   Margins Attached edges 09/29/20 0800   Closure Secondary intention 09/29/20 0800   Drainage Amount Moderate 09/29/20 0800   Drainage Description Serosanguineous 09/29/20 0800   Treatments Cleansed;Topical Lidocaine;Provider debridement;Site care 09/29/20 0800   Wound Cleansing Puracyn [place] 09/29/20 0800   Periwound Protectant Skin Protectant Wipes to Periwound;Barrier Paste 09/29/20 0800   Dressing Cleansing/Solutions Not Applicable 09/29/20 0800   Dressing Options Puracyn Gel;Hydrofiber Silver Strip;Hydrofiber Silver;Nonadhesive Foam;Hypafix Tape 09/29/20 0800   Dressing Changed Changed 09/29/20 0800   Dressing Status Clean;Dry;Intact 09/29/20 0800   Dressing Change/Treatment Frequency Weekly, and As Needed 09/22/20 1000   Wound Length (cm) 1.5 cm 09/29/20 0800   Wound Width (cm) 1.2 cm 09/29/20 0800   Wound Depth (cm) 0.6 cm 09/29/20 0800   Wound Surface Area (cm^2) 1.8 cm^2 09/29/20 0800   Wound Volume (cm^3) 1.08 cm^3 09/29/20 0800   Post-Procedure Length (cm) 1.6 cm 09/29/20 0800   Post-Procedure Width (cm) 1.2 cm 09/29/20 0800   Post-Procedure Depth (cm) 0.7 cm 09/29/20 0800   Post-Procedure Surface Area (cm^2) 1.92 cm^2 09/29/20 0800   Post-Procedure Volume (cm^3) 1.34 cm^3 09/29/20 0800   Wound Healing % -391 09/29/20 0800   Wound Bed Granulation (%) 40 % 09/22/20 1000   Wound Bed Slough (%) 60 % 09/22/20 1000   Wound Bed Granulation (%) - Post-Procedure 100 % 09/22/20 1000   Tunneling (cm) 0 cm 09/29/20 0800   Undermining (cm) 0 cm 09/29/20 0800   Wound Odor None 09/29/20 0800   Pulses Not palpable;Doppler;DP;PT;Other (Comments) 09/22/20 1000   Right Foot Monofilament 10-point exam (Sensate) 10/10 09/22/20 1000   Exposed Structures Tendon 09/29/20 0800       Wound 09/22/20 Diabetic Ulcer Toe, 4th Distal;Plantar Right R distal plantar 4th toe DFU, eschar (Active)   Wound Image   09/29/20 0800   Site Assessment Dry;Brown 09/29/20 0800   Periwound Assessment Clean;Dry;Intact 09/29/20 0800    Margins Attached edges 09/22/20 1000   Closure Secondary intention 09/29/20 0800   Drainage Amount None 09/29/20 0800   Dressing Cleansing/Solutions 3% Betadine 09/29/20 0800   Dressing Options Open to Air 09/29/20 0800   Dressing Status Open to Air 09/29/20 0800   Wound Length (cm) 0.6 cm 09/29/20 0800   Wound Width (cm) 1 cm 09/29/20 0800   Wound Depth (cm) 0 cm 09/22/20 1000   Wound Surface Area (cm^2) 0.6 cm^2 09/29/20 0800   Wound Volume (cm^3) 0 cm^3 09/22/20 1000   Wound Bed Eschar (%) 100 % 09/29/20 0800   Wound Odor None 09/29/20 0800   Right Foot Monofilament 10-point exam (Sensate) 10/10 09/22/20 1000   Exposed Structures None 09/29/20 0800        PROCEDURE:   -2% viscous lidocaine applied topically to wound bed for approximately 5 minutes prior to debridement  -Curette used to debride wound bed.  Excisional debridement was performed to remove devitalized tissue until healthy, bleeding tissue was visualized.   Entire surface of wound, 1.92 cm2 debrided.  Tissue debrided into the muscle/fascia layer.    -Bleeding controlled with manual pressure.    -Wound care completed by wound RN, refer to flowsheet  -Patient tolerated the procedure well, without c/o pain or discomfort.       Pertinent Labs and Diagnostics:    Labs:     A1c:   Lab Results   Component Value Date/Time    HBA1C 8.3 (H) 09/08/2020 02:22 PM          IMAGING: MRI Foot Dated 9/8/2020    FINDINGS:     There has been previous 5th digit amputation.     There appears to be an overlying open wound.        BONE AND MARROW: There is mild bone marrow edema within the 5th metatarsal head consistent with osteomyelitis. Also, there is bone marrow edema in the 4th metatarsal head consistent with osteomyelitis.     There is 1st metatarsophalangeal joint osteoarthritis with associated subchondral edema in the distal aspect of the 1st proximal phalanx of the proximal aspect of the 1st distal phalanx     MUSCLES: The muscular compartment is normal in  appearance.     LIGAMENTS and TENDONS: The visualized ligament and tendon are normal in appearance.     MISCELLANEOUS: No fluid collection or mass.       VASCULAR STUDIES: US-[place] Single Level    RIGHT      Waveform            Systolic BPs (mmHg)                              146           Brachial   Triphasic                                Common Femoral   Triphasic                  138           Posterior Tibial   Triphasic                  144           Dorsalis Pedis                                            Peroneal                              0.99          [place]                                            TBI                           LEFT   Waveform        Systolic BPs (mmHg)                              141           Brachial   Triphasic                                Common Femoral   Triphasic                  166           Posterior Tibial   Triphasic                  156           Dorsalis Pedis                                            Peroneal                              1.14          [place]                                            TBI    LAST  WOUND CULTURE:  DATE : 9/9/2020      Component 13d ago   Significant Indicator POSPositive (POS)    Source BONE    Site Right Fifth Toe    Culture Result -Abnormal     Culture Result Abnormal   Bacteroides uniformis   Rare growth                       ASSESSMENT AND PLAN:   1. Non-healing surgical wound, initial encounter    -Excisional debridement of wound minimal in clinic today.  We will do a thorough debridement next clinic visit before application of wound VAC.  Tendon is visible to wound bed.  Wound has considerable depth this is the reason for application of wound VAC.  -Patient to return to clinic weekly for assessment and debridement  -Patient to change dressing 1-2 times per week in between clinic visits   Wound Care:  Puracyn gel,Hydrofiber silver, Hypafix tape, nonadhesive foam    Patient has an eschar To the distal aspect of his fourth right toe was  painted with Betadine in clinic today.  Continue to monitor for opening or worsening of wound.    2. Type 2 diabetes mellitus with hyperglycemia, without long-term current use of insulin (HCC)  Patient instructed to keep tight control over FBS, <140 for optimal wound healing; Implications of loss of protective sensation (LOPS) discussed with patient, including increased risk for amputation.  Advised to check feet at least daily, moisturize feet, and to always wear protective foot wear, arrange meticulous regular foot care by podiatrist or [facility]. Pt with good understanding.   -Patient reports that her blood sugar in clinic today was 88    3. Wound infection  Wound culture on 9/9/2020+ for bacteroides uniformis  Patient is currently on Bactrim and Augmentin with end date 10/20/2020      PATIENT EDUCATION  - Importance of adequate nutrition for wound healing  -Advised to go to ER for any increased redness, swelling, drainage, or odor, or if patient develops fever, chills, nausea or vomiting.           Please note that this note may have been created using voice recognition software. I have worked with technical experts from [employer] to optimize the interface.  I have made every reasonable attempt to correct obvious errors, but there may be errors of grammar and possibly content that I did not discover before finalizing the note.    N

## 2020-10-06 ENCOUNTER — TELEPHONE (OUTPATIENT)
Dept: WOUND CARE | Facility: MEDICAL CENTER | Age: 55
End: 2020-10-06

## 2020-10-06 NOTE — TELEPHONE ENCOUNTER
Patient no show for appointment this morning at 0915. This is patient's first no show.  Telephone call to patients listed number, patient states that he arrived 15 minutes late for appointment, and was told by Valentin his next appointment is 10/13 at 0930.    This was not communicated to this RN prior to phone call to patient.

## 2020-10-08 RX ORDER — BLOOD SUGAR DIAGNOSTIC
1 STRIP MISCELLANEOUS
Qty: 100 STRIP | Refills: 1 | Status: SHIPPED
Start: 2020-10-08 | End: 2021-07-20

## 2020-10-08 RX ORDER — BLOOD SUGAR DIAGNOSTIC
1 STRIP MISCELLANEOUS
COMMUNITY
End: 2020-10-08 | Stop reason: SDUPTHER

## 2020-10-13 ENCOUNTER — OFFICE VISIT (OUTPATIENT)
Dept: WOUND CARE | Facility: MEDICAL CENTER | Age: 55
End: 2020-10-13
Attending: INTERNAL MEDICINE
Payer: COMMERCIAL

## 2020-10-13 VITALS
HEART RATE: 102 BPM | SYSTOLIC BLOOD PRESSURE: 123 MMHG | DIASTOLIC BLOOD PRESSURE: 83 MMHG | TEMPERATURE: 97.1 F | RESPIRATION RATE: 16 BRPM | OXYGEN SATURATION: 95 %

## 2020-10-13 DIAGNOSIS — T81.89XD NONHEALING SURGICAL WOUND, SUBSEQUENT ENCOUNTER: Primary | ICD-10-CM

## 2020-10-13 DIAGNOSIS — L08.9 WOUND INFECTION: ICD-10-CM

## 2020-10-13 DIAGNOSIS — T14.8XXA WOUND INFECTION: ICD-10-CM

## 2020-10-13 DIAGNOSIS — E11.65 TYPE 2 DIABETES MELLITUS WITH HYPERGLYCEMIA, WITHOUT LONG-TERM CURRENT USE OF INSULIN (HCC): ICD-10-CM

## 2020-10-13 PROCEDURE — 11043 DBRDMT MUSC&/FSCA 1ST 20/<: CPT

## 2020-10-13 PROCEDURE — 11042 DBRDMT SUBQ TIS 1ST 20SQCM/<: CPT

## 2020-10-13 PROCEDURE — 97605 NEG PRS WND THER DME<=50SQCM: CPT

## 2020-10-13 PROCEDURE — 11043 DBRDMT MUSC&/FSCA 1ST 20/<: CPT | Performed by: NURSE PRACTITIONER

## 2020-10-13 ASSESSMENT — ENCOUNTER SYMPTOMS
HEADACHES: 0
VOMITING: 0
WEAKNESS: 0
DOUBLE VISION: 0
CHILLS: 0
WEIGHT LOSS: 1
SHORTNESS OF BREATH: 0
FEVER: 0
PALPITATIONS: 0
DIZZINESS: 0
WHEEZING: 0
NAUSEA: 0
BLURRED VISION: 0

## 2020-10-13 NOTE — PROGRESS NOTES
Provider Encounter- Full Thickness wound    HISTORY OF PRESENT ILLNESS  Wound History:    START OF CARE IN CLINIC: 9/20/2020    REFERRING PROVIDER: Edith Kaminski D.O.      WOUND- Full Thickness Wound   LOCATION:    HISTORY: Patient was admitted to [facility] on 9/8/2020 patient had a ischemic toe injury with necrosis/osteo-myelitis requiring amputation of the fifth toe he was readmitted due to worsening pain and redness of the surgical wound site despite being initiated on oral antibiotics.  MRI revealed osteomyelitis of the distal MTP joint and of the fourth and fifth digits.  He underwent surgical procedure debridement of previous surgical site by Dr. Mallory.  His wound was cultured while in the hospital and grew out MSSA, diphtheroids and strep viridans.  Prior to discharge she was placed on 6 weeks of oral antibiotics.  This included Augmentin and Bactrim DS through 10/29/2020.  He is a newly diagnosed diabetic recently started on insulin.  His A1c was approximately 10.5.  He apparently had good glucose control over the last 6 weeks and has dropped nearly 2 points in his A1c.  At this time he reports that he is no longer taking insulin because he is controlled by his diet.    Pertinent Medical History: Diabetes type 2, hypertension, osteomyelitis    TOBACCO USE: Former smoker    Patient's problem list, allergies, and current medications reviewed and updated in Epic    Interval History:  9/22/2020: Clinic visit with MALINDA Aponte. Patient states that they are feeling well today.  Patient denies fever, chills, nausea, vomiting, lightheadedness, dizziness, shortness of breath and chest pain.  Removal of sutures.  Underlining tissue nonviable, removed through debridement.       9/29/2020: Clinic visit with MALINDA Aponte. Patient states that they are feeling well today.  Patient denies fever, chills, nausea, vomiting, lightheadedness, dizziness, shortness of breath and  chest pain.  We will put in for approval for wound VAC and initiate next Tuesday and next clinic appointment if approved.      10/13/2020: Clinic visit with MALINDA Aponte. Patient states that they are feeling well today.  Patient denies fever, chills, nausea, vomiting, lightheadedness, dizziness, shortness of breath and chest pain.  Wound VAC therapy initiated to right lateral wound.        REVIEW OF SYSTEMS:   Review of Systems   Constitutional: Positive for weight loss. Negative for chills and fever.        Intentional weight loss   Eyes: Negative for blurred vision and double vision.   Respiratory: Negative for shortness of breath and wheezing.    Cardiovascular: Negative for chest pain, palpitations and leg swelling.   Gastrointestinal: Negative for nausea and vomiting.   Skin: Negative for itching and rash.        Surgical wound right lateral foot  Eschar Right third toe   Neurological: Negative for dizziness, weakness and headaches.       PHYSICAL EXAMINATION:   /83   Pulse (!) 102   Temp 36.2 °C (97.1 °F)   Resp 16   SpO2 95%     Physical Exam   Constitutional: He is oriented to person, place, and time and well-developed, well-nourished, and in no distress.   HENT:   Head: Normocephalic.   Eyes: Pupils are equal, round, and reactive to light. Conjunctivae are normal.   Cardiovascular: Intact distal pulses.   Pulmonary/Chest: Effort normal and breath sounds normal. He has no wheezes.   Musculoskeletal:         General: Tenderness present.   Neurological: He is alert and oriented to person, place, and time.   Skin: Skin is warm. No rash noted. No pallor.   Surgical wound right lateral foot  Eschar Right third toe decreasing in size edges trimmed  Significant amount of callus to right foot         WOUND ASSESSMENT        Negative Pressure Wound Therapy 10/13/20 (Active)   NPWT Pump Mode / Pressure Setting Continuous;125 mmHg 10/13/20 0900   Dressing Type Black Foam (Regular) 10/13/20 0900    Number of Foam Pieces Used 1 10/13/20 0900   Canister Changed Yes 10/13/20 0900           Wound 08/01/20 Diabetic Ulcer Foot Plantar Right between 1-2 toes callus with wound (Active)       Wound 08/06/20 Incision Toe, 5th Right (Active)       Wound 09/08/20 Diabetic Ulcer Toe, 4th Right POA (Active)       Wound 09/09/20 Incision Foot Right (Active)       Wound 09/22/20 Incision Toe, 5th Right R 5th toe amputation site dehisced incision (Active)   Wound Image    10/13/20 0900   Site Assessment Pink;Yellow;White 10/13/20 0900   Periwound Assessment Dry;Intact 10/13/20 0900   Margins Attached edges 10/13/20 0900   Closure Secondary intention 10/13/20 0900   Drainage Amount Moderate 10/13/20 0900   Drainage Description Serosanguineous 10/13/20 0900   Treatments Cleansed;Topical Lidocaine;Provider debridement;Site care 10/13/20 0900   Wound Cleansing Puracyn [place] 10/13/20 0900   Periwound Protectant Benzoin;Drape 10/13/20 0900   Dressing Cleansing/Solutions Not Applicable 10/13/20 0900   Dressing Options Puracyn Gel;Petroleum Gauze (clear);Wound Vac 10/13/20 0900   Dressing Changed New 10/13/20 0900   Dressing Status Clean;Dry;Intact 10/13/20 0900   Dressing Change/Treatment Frequency Weekly, and As Needed 09/22/20 1000   Non-staged Wound Description Full thickness 10/13/20 0900   Wound Length (cm) 1.3 cm 10/13/20 0900   Wound Width (cm) 1.1 cm 10/13/20 0900   Wound Depth (cm) 0.6 cm 10/13/20 0900   Wound Surface Area (cm^2) 1.43 cm^2 10/13/20 0900   Wound Volume (cm^3) 0.86 cm^3 10/13/20 0900   Post-Procedure Length (cm) 1.5 cm 10/13/20 0900   Post-Procedure Width (cm) 1.1 cm 10/13/20 0900   Post-Procedure Depth (cm) 0.6 cm 10/13/20 0900   Post-Procedure Surface Area (cm^2) 1.65 cm^2 10/13/20 0900   Post-Procedure Volume (cm^3) 0.99 cm^3 10/13/20 0900   Wound Healing % -291 10/13/20 0900   Wound Bed Granulation (%) 40 % 09/22/20 1000   Wound Bed Slough (%) 60 % 09/22/20 1000   Wound Bed Granulation (%) -  Post-Procedure 100 % 09/22/20 1000   Tunneling (cm) 0 cm 10/13/20 0900   Undermining (cm) 0 cm 10/13/20 0900   Wound Odor None 10/13/20 0900   Pulses Not palpable;Doppler;DP;PT;Other (Comments) 09/22/20 1000   Right Foot Monofilament 10-point exam (Sensate) 10/10 09/22/20 1000   Exposed Structures Tendon 10/13/20 0900       Wound 09/22/20 Diabetic Ulcer Toe, 4th Distal;Plantar Right R distal plantar 4th toe DFU, eschar (Active)   Wound Image   10/13/20 0900   Site Assessment Dry;Brown;Yellow 10/13/20 0900   Periwound Assessment Clean;Dry;Intact 10/13/20 0900   Margins Attached edges 10/13/20 0900   Closure Secondary intention 10/13/20 0900   Drainage Amount None 10/13/20 0900   Treatments Provider debridement;Site care 10/13/20 0900   Wound Cleansing Not Applicable 10/13/20 0900   Periwound Protectant Not Applicable 10/13/20 0900   Dressing Cleansing/Solutions 3% Betadine 10/13/20 0900   Dressing Options Open to Air 10/13/20 0900   Dressing Status Open to Air 10/13/20 0900   Wound Length (cm) 0.6 cm 09/29/20 0800   Wound Width (cm) 1 cm 09/29/20 0800   Wound Depth (cm) 0 cm 09/22/20 1000   Wound Surface Area (cm^2) 0.6 cm^2 09/29/20 0800   Wound Volume (cm^3) 0 cm^3 09/22/20 1000   Wound Bed Eschar (%) 100 % 10/13/20 0900   Wound Odor None 10/13/20 0900   Right Foot Monofilament 10-point exam (Sensate) 10/10 09/22/20 1000   Exposed Structures None 10/13/20 0900               PROCEDURE:   -2% viscous lidocaine applied topically to wound bed for approximately 5 minutes prior to debridement  -Curette used to debride wound bed.  Excisional debridement was performed to remove devitalized tissue until healthy, bleeding tissue was visualized.   Entire surface of wound, 1.65 cm2 debrided.  Tissue debrided into the muscle/fascia layer.    -Bleeding controlled with manual pressure.    -Wound care completed by wound RN, refer to flowsheet  -Patient tolerated the procedure well, without c/o pain or discomfort.       Pertinent  Labs and Diagnostics:    Labs:     A1c:   Lab Results   Component Value Date/Time    HBA1C 8.3 (H) 09/08/2020 02:22 PM          IMAGING: MRI Foot Dated 9/8/2020    FINDINGS:     There has been previous 5th digit amputation.     There appears to be an overlying open wound.        BONE AND MARROW: There is mild bone marrow edema within the 5th metatarsal head consistent with osteomyelitis. Also, there is bone marrow edema in the 4th metatarsal head consistent with osteomyelitis.     There is 1st metatarsophalangeal joint osteoarthritis with associated subchondral edema in the distal aspect of the 1st proximal phalanx of the proximal aspect of the 1st distal phalanx     MUSCLES: The muscular compartment is normal in appearance.     LIGAMENTS and TENDONS: The visualized ligament and tendon are normal in appearance.     MISCELLANEOUS: No fluid collection or mass.       VASCULAR STUDIES: US-[place] Single Level    RIGHT      Waveform            Systolic BPs (mmHg)                              146           Brachial   Triphasic                                Common Femoral   Triphasic                  138           Posterior Tibial   Triphasic                  144           Dorsalis Pedis                                            Peroneal                              0.99          [place]                                            TBI                           LEFT   Waveform        Systolic BPs (mmHg)                              141           Brachial   Triphasic                                Common Femoral   Triphasic                  166           Posterior Tibial   Triphasic                  156           Dorsalis Pedis                                            Peroneal                              1.14          [place]                                            TBI    LAST  WOUND CULTURE:  DATE : 9/9/2020      Component 13d ago   Significant Indicator POSPositive (POS)    Source BONE    Site Right Fifth Toe    Culture Result  -Abnormal     Culture Result Abnormal   Bacteroides uniformis   Rare growth                       ASSESSMENT AND PLAN:   1. Non-healing surgical wound, subsequent encounter    -Excisional debridement of wound today in clinic rolled edges taken down to stimulate granulation tissue formation prior to placement of wound VAC.  Tendon is visible to wound bed.  Wound has considerable depth this is the reason for application of wound VAC.  -Patient to return to clinic weekly for assessment and debridement  -Patient to return to clinic twice weekly for wound VAC dressing changes 1 time a week for assessment and debridement   Wound Care: Adaptic over tendon, placement of wound VAC    Patient has an eschar To the distal aspect of his fourth right toe was painted with Betadine in clinic today.  Edges trimmed in clinic today area of eschar Is decreasing.  Still firm.    2. Type 2 diabetes mellitus with hyperglycemia, without long-term current use of insulin (HCC)  Patient instructed to keep tight control over FBS, <140 for optimal wound healing; Implications of loss of protective sensation (LOPS) discussed with patient, including increased risk for amputation.  Advised to check feet at least daily, moisturize feet, and to always wear protective foot wear, arrange meticulous regular foot care by podiatrist or [facility]. Pt with good understanding.   -Patient reports that her blood sugar in clinic today was 121    3. Wound infection  Wound culture on 9/9/2020+ for bacteroides uniformis  Patient is currently on Bactrim and Augmentin with end date 10/20/2020      PATIENT EDUCATION  - Importance of adequate nutrition for wound healing  -Advised to go to ER for any increased redness, swelling, drainage, or odor, or if patient develops fever, chills, nausea or vomiting.           Please note that this note may have been created using voice recognition software. I have worked with technical experts from [employer] to optimize the  interface.  I have made every reasonable attempt to correct obvious errors, but there may be errors of grammar and possibly content that I did not discover before finalizing the note.    N

## 2020-10-13 NOTE — PATIENT INSTRUCTIONS
-Keep your wound dressing clean, dry, and intact.    -Wound vac may not have any drainage in tube or cannister & it will still be working.   Change cannister if it does become full by pressing tab on side of machine to remove canister and snap on new one. Full canister can be thrown in the trash. If cannister fills with bright red blood - go to ER. Dressing will be changed every MWF at the wound clini.  If you are having issues with your wound VAC, please consider patching leaks, changing the canister, or calling [phone] for troubleshooting. If the wound VAC has been off or un-operational for over 2 hours, call wound care center to inform them and remove all dressings including black foam and replace with normal saline damp gauze.     -Should you experience any significant changes in your wound(s), such as infection (redness, swelling, localized heat, increased pain, fever > 101 F, chills) or have any questions regarding your home care instructions, please contact the wound center at [phone]. If after hours, contact your primary care physician or go to the hospital emergency room.

## 2020-10-14 ENCOUNTER — TELEPHONE (OUTPATIENT)
Dept: MEDICAL GROUP | Facility: LAB | Age: 55
End: 2020-10-14

## 2020-10-14 ENCOUNTER — DOCUMENTATION (OUTPATIENT)
Dept: INFECTIOUS DISEASES | Facility: MEDICAL CENTER | Age: 55
End: 2020-10-14

## 2020-10-14 ENCOUNTER — HOSPITAL ENCOUNTER (EMERGENCY)
Facility: MEDICAL CENTER | Age: 55
End: 2020-10-14
Attending: EMERGENCY MEDICINE
Payer: COMMERCIAL

## 2020-10-14 ENCOUNTER — HOSPITAL ENCOUNTER (OUTPATIENT)
Dept: LAB | Facility: MEDICAL CENTER | Age: 55
End: 2020-10-14
Attending: INTERNAL MEDICINE
Payer: COMMERCIAL

## 2020-10-14 VITALS
BODY MASS INDEX: 31.07 KG/M2 | HEIGHT: 68 IN | HEART RATE: 83 BPM | TEMPERATURE: 97 F | WEIGHT: 205.03 LBS | SYSTOLIC BLOOD PRESSURE: 102 MMHG | DIASTOLIC BLOOD PRESSURE: 66 MMHG | RESPIRATION RATE: 16 BRPM | OXYGEN SATURATION: 98 %

## 2020-10-14 DIAGNOSIS — E87.5 HYPERKALEMIA: ICD-10-CM

## 2020-10-14 DIAGNOSIS — E11.65 TYPE 2 DIABETES MELLITUS WITH HYPERGLYCEMIA, WITH LONG-TERM CURRENT USE OF INSULIN (HCC): ICD-10-CM

## 2020-10-14 DIAGNOSIS — T50.905A ADVERSE EFFECT OF DRUG, INITIAL ENCOUNTER: ICD-10-CM

## 2020-10-14 DIAGNOSIS — Z79.4 TYPE 2 DIABETES MELLITUS WITH HYPERGLYCEMIA, WITH LONG-TERM CURRENT USE OF INSULIN (HCC): ICD-10-CM

## 2020-10-14 DIAGNOSIS — L08.9 DIABETIC FOOT INFECTION (HCC): ICD-10-CM

## 2020-10-14 DIAGNOSIS — E11.628 DIABETIC FOOT INFECTION (HCC): ICD-10-CM

## 2020-10-14 DIAGNOSIS — E66.09 CLASS 1 OBESITY DUE TO EXCESS CALORIES WITHOUT SERIOUS COMORBIDITY WITH BODY MASS INDEX (BMI) OF 33.0 TO 33.9 IN ADULT: ICD-10-CM

## 2020-10-14 DIAGNOSIS — N18.9 CHRONIC KIDNEY DISEASE, UNSPECIFIED CKD STAGE: ICD-10-CM

## 2020-10-14 DIAGNOSIS — E55.9 VITAMIN D DEFICIENCY: ICD-10-CM

## 2020-10-14 DIAGNOSIS — E11.65 TYPE 2 DIABETES MELLITUS WITH HYPERGLYCEMIA, WITHOUT LONG-TERM CURRENT USE OF INSULIN (HCC): ICD-10-CM

## 2020-10-14 LAB
25(OH)D3 SERPL-MCNC: 18 NG/ML (ref 30–100)
ALBUMIN SERPL BCP-MCNC: 4.1 G/DL (ref 3.2–4.9)
ALBUMIN SERPL BCP-MCNC: 4.4 G/DL (ref 3.2–4.9)
ALBUMIN/GLOB SERPL: 1.5 G/DL
ALBUMIN/GLOB SERPL: 1.5 G/DL
ALP SERPL-CCNC: 56 U/L (ref 30–99)
ALP SERPL-CCNC: 57 U/L (ref 30–99)
ALT SERPL-CCNC: 36 U/L (ref 2–50)
ALT SERPL-CCNC: 38 U/L (ref 2–50)
ANION GAP SERPL CALC-SCNC: 12 MMOL/L (ref 7–16)
ANION GAP SERPL CALC-SCNC: 13 MMOL/L (ref 7–16)
AST SERPL-CCNC: 29 U/L (ref 12–45)
AST SERPL-CCNC: 36 U/L (ref 12–45)
BASOPHILS # BLD AUTO: 1.5 % (ref 0–1.8)
BASOPHILS # BLD AUTO: 1.7 % (ref 0–1.8)
BASOPHILS # BLD: 0.13 K/UL (ref 0–0.12)
BASOPHILS # BLD: 0.14 K/UL (ref 0–0.12)
BILIRUB SERPL-MCNC: <0.2 MG/DL (ref 0.1–1.5)
BILIRUB SERPL-MCNC: <0.2 MG/DL (ref 0.1–1.5)
BUN SERPL-MCNC: 33 MG/DL (ref 8–22)
BUN SERPL-MCNC: 40 MG/DL (ref 8–22)
CALCIUM SERPL-MCNC: 8.9 MG/DL (ref 8.4–10.2)
CALCIUM SERPL-MCNC: 9.5 MG/DL (ref 8.5–10.5)
CHLORIDE SERPL-SCNC: 102 MMOL/L (ref 96–112)
CHLORIDE SERPL-SCNC: 104 MMOL/L (ref 96–112)
CHOLEST SERPL-MCNC: 199 MG/DL (ref 100–199)
CO2 SERPL-SCNC: 19 MMOL/L (ref 20–33)
CO2 SERPL-SCNC: 20 MMOL/L (ref 20–33)
CREAT SERPL-MCNC: 2.17 MG/DL (ref 0.5–1.4)
CREAT SERPL-MCNC: 2.39 MG/DL (ref 0.5–1.4)
CRP SERPL HS-MCNC: 0.17 MG/DL (ref 0–0.75)
EKG IMPRESSION: NORMAL
EOSINOPHIL # BLD AUTO: 0.4 K/UL (ref 0–0.51)
EOSINOPHIL # BLD AUTO: 0.57 K/UL (ref 0–0.51)
EOSINOPHIL NFR BLD: 5.4 % (ref 0–6.9)
EOSINOPHIL NFR BLD: 6.3 % (ref 0–6.9)
ERYTHROCYTE [DISTWIDTH] IN BLOOD BY AUTOMATED COUNT: 42 FL (ref 35.9–50)
ERYTHROCYTE [DISTWIDTH] IN BLOOD BY AUTOMATED COUNT: 45.7 FL (ref 35.9–50)
ERYTHROCYTE [SEDIMENTATION RATE] IN BLOOD BY WESTERGREN METHOD: 18 MM/HOUR (ref 0–20)
GLOBULIN SER CALC-MCNC: 2.8 G/DL (ref 1.9–3.5)
GLOBULIN SER CALC-MCNC: 2.9 G/DL (ref 1.9–3.5)
GLUCOSE SERPL-MCNC: 131 MG/DL (ref 65–99)
GLUCOSE SERPL-MCNC: 79 MG/DL (ref 65–99)
HCT VFR BLD AUTO: 37.4 % (ref 42–52)
HCT VFR BLD AUTO: 42.2 % (ref 42–52)
HDLC SERPL-MCNC: 41 MG/DL
HGB BLD-MCNC: 12.6 G/DL (ref 14–18)
HGB BLD-MCNC: 13.8 G/DL (ref 14–18)
IMM GRANULOCYTES # BLD AUTO: 0.02 K/UL (ref 0–0.11)
IMM GRANULOCYTES # BLD AUTO: 0.02 K/UL (ref 0–0.11)
IMM GRANULOCYTES NFR BLD AUTO: 0.2 % (ref 0–0.9)
IMM GRANULOCYTES NFR BLD AUTO: 0.3 % (ref 0–0.9)
LDLC SERPL CALC-MCNC: 123 MG/DL
LYMPHOCYTES # BLD AUTO: 1.81 K/UL (ref 1–4.8)
LYMPHOCYTES # BLD AUTO: 2.76 K/UL (ref 1–4.8)
LYMPHOCYTES NFR BLD: 24.3 % (ref 22–41)
LYMPHOCYTES NFR BLD: 30.5 % (ref 22–41)
MCH RBC QN AUTO: 30.2 PG (ref 27–33)
MCH RBC QN AUTO: 30.7 PG (ref 27–33)
MCHC RBC AUTO-ENTMCNC: 32.7 G/DL (ref 33.7–35.3)
MCHC RBC AUTO-ENTMCNC: 33.7 G/DL (ref 33.7–35.3)
MCV RBC AUTO: 89.7 FL (ref 81.4–97.8)
MCV RBC AUTO: 93.8 FL (ref 81.4–97.8)
MONOCYTES # BLD AUTO: 0.67 K/UL (ref 0–0.85)
MONOCYTES # BLD AUTO: 0.89 K/UL (ref 0–0.85)
MONOCYTES NFR BLD AUTO: 9 % (ref 0–13.4)
MONOCYTES NFR BLD AUTO: 9.8 % (ref 0–13.4)
NEUTROPHILS # BLD AUTO: 4.42 K/UL (ref 1.82–7.42)
NEUTROPHILS # BLD AUTO: 4.67 K/UL (ref 1.82–7.42)
NEUTROPHILS NFR BLD: 51.7 % (ref 44–72)
NEUTROPHILS NFR BLD: 59.3 % (ref 44–72)
NRBC # BLD AUTO: 0 K/UL
NRBC # BLD AUTO: 0 K/UL
NRBC BLD-RTO: 0 /100 WBC
NRBC BLD-RTO: 0 /100 WBC
PLATELET # BLD AUTO: 261 K/UL (ref 164–446)
PLATELET # BLD AUTO: 287 K/UL (ref 164–446)
PMV BLD AUTO: 10.3 FL (ref 9–12.9)
PMV BLD AUTO: 9.8 FL (ref 9–12.9)
POTASSIUM SERPL-SCNC: 5.8 MMOL/L (ref 3.6–5.5)
POTASSIUM SERPL-SCNC: 6.4 MMOL/L (ref 3.6–5.5)
PROT SERPL-MCNC: 6.9 G/DL (ref 6–8.2)
PROT SERPL-MCNC: 7.3 G/DL (ref 6–8.2)
RBC # BLD AUTO: 4.17 M/UL (ref 4.7–6.1)
RBC # BLD AUTO: 4.5 M/UL (ref 4.7–6.1)
SODIUM SERPL-SCNC: 134 MMOL/L (ref 135–145)
SODIUM SERPL-SCNC: 136 MMOL/L (ref 135–145)
TRIGL SERPL-MCNC: 177 MG/DL (ref 0–149)
WBC # BLD AUTO: 7.5 K/UL (ref 4.8–10.8)
WBC # BLD AUTO: 9.1 K/UL (ref 4.8–10.8)

## 2020-10-14 PROCEDURE — 82043 UR ALBUMIN QUANTITATIVE: CPT

## 2020-10-14 PROCEDURE — 99283 EMERGENCY DEPT VISIT LOW MDM: CPT

## 2020-10-14 PROCEDURE — 80053 COMPREHEN METABOLIC PANEL: CPT | Mod: 91

## 2020-10-14 PROCEDURE — 80061 LIPID PANEL: CPT

## 2020-10-14 PROCEDURE — 80053 COMPREHEN METABOLIC PANEL: CPT

## 2020-10-14 PROCEDURE — 82306 VITAMIN D 25 HYDROXY: CPT

## 2020-10-14 PROCEDURE — 82570 ASSAY OF URINE CREATININE: CPT

## 2020-10-14 PROCEDURE — 85652 RBC SED RATE AUTOMATED: CPT

## 2020-10-14 PROCEDURE — 93005 ELECTROCARDIOGRAM TRACING: CPT | Performed by: EMERGENCY MEDICINE

## 2020-10-14 PROCEDURE — 85025 COMPLETE CBC W/AUTO DIFF WBC: CPT | Mod: 91

## 2020-10-14 PROCEDURE — 85025 COMPLETE CBC W/AUTO DIFF WBC: CPT

## 2020-10-14 PROCEDURE — 36415 COLL VENOUS BLD VENIPUNCTURE: CPT

## 2020-10-14 PROCEDURE — 86140 C-REACTIVE PROTEIN: CPT

## 2020-10-14 ASSESSMENT — FIBROSIS 4 INDEX: FIB4 SCORE: 0.93

## 2020-10-15 ENCOUNTER — TELEPHONE (OUTPATIENT)
Dept: MEDICAL GROUP | Facility: LAB | Age: 55
End: 2020-10-15

## 2020-10-15 DIAGNOSIS — E11.628 DIABETIC FOOT INFECTION (HCC): ICD-10-CM

## 2020-10-15 DIAGNOSIS — L08.9 DIABETIC FOOT INFECTION (HCC): ICD-10-CM

## 2020-10-15 DIAGNOSIS — N05.9 NEPHRITIS: ICD-10-CM

## 2020-10-15 LAB
CREAT UR-MCNC: 119.23 MG/DL
MICROALBUMIN UR-MCNC: 79.7 MG/DL
MICROALBUMIN/CREAT UR: 668 MG/G (ref 0–30)

## 2020-10-15 NOTE — TELEPHONE ENCOUNTER
1. Caller Name: Mc                         Call Back Number: [phone] (home)        How would the patient prefer to be contacted with a response: Phone call OK to leave a detailed message    Pt's Bactrim was discontinued yesterday in the ER due to elevated potassium.  He will need a new abx.

## 2020-10-15 NOTE — ED NOTES
Pt ambulates to room w/ slow, limping gait aided by walking boot. A & O, privacy, gowned, monitored and support given. Spouse at bedside.    Pt states had labs drawn today related to 38 day therapy of ABX for infection s/p toe removal on R foot.    Pt states potassium is elevated and he has a rash. Was told by infectious disease doctor to come to ER for assessment and treatment.

## 2020-10-15 NOTE — TELEPHONE ENCOUNTER
Lani:  Please call Mc, I have sent a note to Dr. Guru Acuña (of infectious diseases,) to see what his recommendations are.  Regards, Thiago Kauffman, DO

## 2020-10-15 NOTE — PROGRESS NOTES
Noted monitoring labs from today with significant hyperkalemia at 6.4, new PARIS with creatinine of 2.17.  Patient with solitary kidney.  Patient is currently on Bactrim and Augmentin.    Spoke with patient, he is feeling generally well with no palpitations or chest pain.  He has been dealing with a rash that began under the armpits for which his PCP prescribed antifungal medications but it has not improved, now he has more rashes over his back.  No mucous membrane involvement.    Constellation of rash, hyperkalemia, PARIS concerning for side effect of Bactrim.  Given the significantly high potassium and solitary kidney, recommend heading to the ER for evaluation.  He will likely need repeat BMP with EKG and potassium lowering measures if still elevated.

## 2020-10-15 NOTE — PROGRESS NOTES
Urgent request to nephrology secondary to renal insufficiency suspect secondary to Bactrim versus Augmentin.  History of osteomyelitis

## 2020-10-15 NOTE — ED PROVIDER NOTES
"ED Provider Note    CHIEF COMPLAINT  Chief Complaint   Patient presents with   • Abnormal Labs     Told renal function elevated and high K Had labs drawn at noon and called this pm to be treated in ER       HPI  Mc Eugene is a 55 y.o. male who presents to the emergency department for \"abnormal labs \".  Patient with longstanding diabetes and single kidney congenitally.  He has been under ongoing care secondary to postoperative wound infection after toe removal on right foot.  He has now been on prolonged Bactrim and Augmentin medication regimen.  He has been dealing with a \"eczematous rash \"for at least 1 month.  He states that it has somewhat cleared under his right axilla after using antifungal but by enlarge the rash has been unchanged and persistent.  Minimal itch.  No rash or lesions to mouth or eyes or genitalia.  Today had outpatient labs drawn which showed elevated potassium thus sending her to the ER.  Otherwise has been feeling well with no other complaints.    REVIEW OF SYSTEMS  See HPI for further details. All other systems are negative.     PAST MEDICAL HISTORY   has a past medical history of Diabetes (HCC) and Kidney anomaly, congenital.    SOCIAL HISTORY  Social History     Tobacco Use   • Smoking status: Former Smoker   • Smokeless tobacco: Never Used   Substance and Sexual Activity   • Alcohol use: Not Currently   • Drug use: Yes     Comment: marijuana vape   • Sexual activity: Not on file       SURGICAL HISTORY   has a past surgical history that includes appendectomy; toe amputation (Right, 8/6/2020); toe amputation (Right, 9/9/2020); and incision and drainage general (Right, 9/9/2020).    CURRENT MEDICATIONS  Home Medications    **Home medications have not yet been reviewed for this encounter**         ALLERGIES  No Known Allergies    PHYSICAL EXAM  VITAL SIGNS: /71   Pulse (!) 107   Temp 36.1 °C (97 °F) (Temporal)   Resp 16   Ht 1.727 m (5' 8\")   Wt 93 kg (205 lb 0.4 oz)   " SpO2 98%   BMI 31.17 kg/m²  @BILL[[id number]::@   Pulse ox interpretation: I interpret this pulse ox as normal.  Constitutional: Alert in no apparent distress.  HENT: No signs of trauma, Bilateral external ears normal, Nose normal.   Eyes: Pupils are equal and reactive, Conjunctiva normal  Neck: Normal range of motion, No tenderness, Supple, No stridor.   Cardiovascular: Regular rate and rhythm, no murmurs.   Thorax & Lungs: Normal breath sounds, No respiratory distress, No wheezing, No chest tenderness.   Abdomen: Bowel sounds normal, Soft, No tenderness, No masses, No pulsatile masses. No peritoneal signs.  Skin: Warm, Dry, eczematous-like patchy rash to bilateral upper extremities and bilateral axilla right greater than left.  No mucosal lesions.    Back: No bony tenderness, No CVA tenderness.   Extremities: Intact distal pulses.  Right lower extremity: Walking boot in place and wounds are bandaged.  Musculoskeletal: Good range of motion in all major joints. No tenderness to palpation or major deformities noted.   Neurologic: Alert , Normal motor function, Normal sensory function, No focal deficits noted.   Psychiatric: Affect normal, Judgment normal, Mood normal.       DIAGNOSTIC STUDIES / PROCEDURES    EKG  Results for orders placed or performed during the hospital encounter of 10/14/20   EKG (NOW)   Result Value Ref Range    Report       [facility] Emergency Dept.    Test Date:  2020-10-14  Pt Name:    YUDITH BOATENG                 Department: [facility]  MRN:        [id number]                      [facility]  Gender:     Male                         Technician:   :        1965                   Requested By:KISHA LOGAN  Order #:    [id number]                    Reading MD:    Measurements  Intervals                                Axis  Rate:       84                           P:          59  ID:         141                          QRS:        76  QRSD:       101                           T:          45  QT:         353  QTc:        418    Interpretive Statements  Sinus rhythm  Borderline low voltage, extremity leads  No previous ECG available for comparison         LABS  Results for orders placed or performed during the hospital encounter of 10/14/20   CBC WITH DIFFERENTIAL   Result Value Ref Range    WBC 9.1 4.8 - 10.8 K/uL    RBC 4.17 (L) 4.70 - 6.10 M/uL    Hemoglobin 12.6 (L) 14.0 - 18.0 g/dL    Hematocrit 37.4 (L) 42.0 - 52.0 %    MCV 89.7 81.4 - 97.8 fL    MCH 30.2 27.0 - 33.0 pg    MCHC 33.7 33.7 - 35.3 g/dL    RDW 42.0 35.9 - 50.0 fL    Platelet Count 261 164 - 446 K/uL    MPV 9.8 9.0 - 12.9 fL    Neutrophils-Polys 51.70 44.00 - 72.00 %    Lymphocytes 30.50 22.00 - 41.00 %    Monocytes 9.80 0.00 - 13.40 %    Eosinophils 6.30 0.00 - 6.90 %    Basophils 1.50 0.00 - 1.80 %    Immature Granulocytes 0.20 0.00 - 0.90 %    Nucleated RBC 0.00 /100 WBC    Neutrophils (Absolute) 4.67 1.82 - 7.42 K/uL    Lymphs (Absolute) 2.76 1.00 - 4.80 K/uL    Monos (Absolute) 0.89 (H) 0.00 - 0.85 K/uL    Eos (Absolute) 0.57 (H) 0.00 - 0.51 K/uL    Baso (Absolute) 0.14 (H) 0.00 - 0.12 K/uL    Immature Granulocytes (abs) 0.02 0.00 - 0.11 K/uL    NRBC (Absolute) 0.00 K/uL   COMP METABOLIC PANEL   Result Value Ref Range    Sodium 136 135 - 145 mmol/L    Potassium 5.8 (H) 3.6 - 5.5 mmol/L    Chloride 104 96 - 112 mmol/L    Co2 19 (L) 20 - 33 mmol/L    Anion Gap 13.0 7.0 - 16.0    Glucose 131 (H) 65 - 99 mg/dL    Bun 40 (H) 8 - 22 mg/dL    Creatinine 2.39 (H) 0.50 - 1.40 mg/dL    Calcium 8.9 8.4 - 10.2 mg/dL    AST(SGOT) 36 12 - 45 U/L    ALT(SGPT) 38 2 - 50 U/L    Alkaline Phosphatase 56 30 - 99 U/L    Total Bilirubin <0.2 0.1 - 1.5 mg/dL    Albumin 4.1 3.2 - 4.9 g/dL    Total Protein 6.9 6.0 - 8.2 g/dL    Globulin 2.8 1.9 - 3.5 g/dL    A-G Ratio 1.5 g/dL   ESTIMATED GFR   Result Value Ref Range    GFR If  34 (A) >60 mL/min/1.73 m 2    GFR If Non  28 (A) >60 mL/min/1.73 m 2    EKG (NOW)   Result Value Ref Range    Report       [facility] Emergency Dept.    Test Date:  2020-10-14  Pt Name:    YUDITH BOATENG                 Department: [facility]  MRN:        [id number]                      Room:       -ROOM 4  Gender:     Male                         Technician: KETAN  :        1965                   Requested By:KISHA LOGAN  Order #:    [id number]                    Reading MD:    Measurements  Intervals                                Axis  Rate:       84                           P:          59  IA:         141                          QRS:        76  QRSD:       101                          T:          45  QT:         353  QTc:        418    Interpretive Statements  Sinus rhythm  Borderline low voltage, extremity leads  No previous ECG available for comparison           RADIOLOGY  No orders to display           COURSE & MEDICAL DECISION MAKING  Pertinent Labs & Imaging studies reviewed. (See chart for details)  55-year-old gentleman presented to the emerge department for hyperkalemia is found on outpatient screening labs.  History as above.  Given his isolated kidney and Bactrim I do believe this may be the causative factor.  Is also taking multivitamins.  At this point I will have him stop the Bactrim and have outpatient follow-up 48-hour recheck.  With regards to his dermatitis it could certainly be either of these antibiotics.  At this point does not appear to be any emergent rash related to the Bactrim.  I will have him continue with the Augmentin and have his infectious disease team decide on further intervention from his ongoing wound infection standpoint.  He is understanding return precautions to the ER with any changes or inability to get outpatient labs drawn.       The patient will return for worsening symptoms and is stable at the time of discharge. The patient verbalizes understanding and will comply.    FINAL IMPRESSION  1. Hyperkalemia    2.  Adverse effect of drug, initial encounter    3. Chronic kidney disease, unspecified CKD stage            Electronically signed by: Pankaj Arceo M.D., 10/14/2020 9:10 PM

## 2020-10-16 ENCOUNTER — TELEPHONE (OUTPATIENT)
Dept: MEDICAL GROUP | Facility: LAB | Age: 55
End: 2020-10-16

## 2020-10-16 ENCOUNTER — OFFICE VISIT (OUTPATIENT)
Dept: WOUND CARE | Facility: MEDICAL CENTER | Age: 55
End: 2020-10-16
Attending: INTERNAL MEDICINE
Payer: COMMERCIAL

## 2020-10-16 DIAGNOSIS — T81.89XD NONHEALING SURGICAL WOUND, SUBSEQUENT ENCOUNTER: ICD-10-CM

## 2020-10-16 PROCEDURE — 97605 NEG PRS WND THER DME<=50SQCM: CPT

## 2020-10-16 NOTE — PROCEDURES
Applied NPWT to left foot wound with 1 piece black foam bridged to leg to accommodate doreen boot; sealed with drape to 125 mmHg.

## 2020-10-16 NOTE — PATIENT INSTRUCTIONS
Wound VAC at 125mmHg continuous with black foam. Dressing will be changed every MWF at the wound clinic or with your home health nurse.  If you are having issues with your wound VAC, please consider patching leaks, changing the canister, or calling [phone] for troubleshooting. If the wound VAC has been off or un-operational for over 2 hours, call wound care center to inform them and remove all dressings including black foam and replace with normal saline damp gauze.     Should you experience any significant changes in your wound(s), such as infection (redness, swelling, localized heat, increased pain, fever > 101 F, chills) or have any questions regarding your home care instructions, please contact the wound center at [phone]. If after hours, contact your primary care physician or go to the hospital emergency room.   Keep dressing clean, dry and cover when bathing. Only change dressing if it's over saturated, soiled or falls off.

## 2020-10-19 ENCOUNTER — HOSPITAL ENCOUNTER (OUTPATIENT)
Dept: LAB | Facility: MEDICAL CENTER | Age: 55
End: 2020-10-19
Attending: NURSE PRACTITIONER
Payer: COMMERCIAL

## 2020-10-19 DIAGNOSIS — E11.628 DIABETIC FOOT INFECTION (HCC): ICD-10-CM

## 2020-10-19 DIAGNOSIS — L08.9 DIABETIC FOOT INFECTION (HCC): ICD-10-CM

## 2020-10-19 LAB
ALBUMIN SERPL BCP-MCNC: 4.3 G/DL (ref 3.2–4.9)
ALBUMIN/GLOB SERPL: 1.4 G/DL
ALP SERPL-CCNC: 58 U/L (ref 30–99)
ALT SERPL-CCNC: 34 U/L (ref 2–50)
ANION GAP SERPL CALC-SCNC: 10 MMOL/L (ref 7–16)
AST SERPL-CCNC: 27 U/L (ref 12–45)
BILIRUB SERPL-MCNC: 0.2 MG/DL (ref 0.1–1.5)
BUN SERPL-MCNC: 28 MG/DL (ref 8–22)
CALCIUM SERPL-MCNC: 9.5 MG/DL (ref 8.5–10.5)
CHLORIDE SERPL-SCNC: 106 MMOL/L (ref 96–112)
CO2 SERPL-SCNC: 22 MMOL/L (ref 20–33)
CREAT SERPL-MCNC: 1.31 MG/DL (ref 0.5–1.4)
GLOBULIN SER CALC-MCNC: 3.1 G/DL (ref 1.9–3.5)
GLUCOSE SERPL-MCNC: 88 MG/DL (ref 65–99)
POTASSIUM SERPL-SCNC: 5.9 MMOL/L (ref 3.6–5.5)
PROT SERPL-MCNC: 7.4 G/DL (ref 6–8.2)
SODIUM SERPL-SCNC: 138 MMOL/L (ref 135–145)

## 2020-10-19 PROCEDURE — 36415 COLL VENOUS BLD VENIPUNCTURE: CPT

## 2020-10-19 PROCEDURE — 80053 COMPREHEN METABOLIC PANEL: CPT

## 2020-10-20 ENCOUNTER — OFFICE VISIT (OUTPATIENT)
Dept: WOUND CARE | Facility: MEDICAL CENTER | Age: 55
End: 2020-10-20
Attending: INTERNAL MEDICINE
Payer: COMMERCIAL

## 2020-10-20 ENCOUNTER — OFFICE VISIT (OUTPATIENT)
Dept: INFECTIOUS DISEASES | Facility: MEDICAL CENTER | Age: 55
End: 2020-10-20
Payer: COMMERCIAL

## 2020-10-20 VITALS
SYSTOLIC BLOOD PRESSURE: 114 MMHG | RESPIRATION RATE: 20 BRPM | OXYGEN SATURATION: 97 % | HEART RATE: 95 BPM | TEMPERATURE: 97.3 F | DIASTOLIC BLOOD PRESSURE: 75 MMHG

## 2020-10-20 VITALS
OXYGEN SATURATION: 96 % | DIASTOLIC BLOOD PRESSURE: 70 MMHG | BODY MASS INDEX: 30.62 KG/M2 | TEMPERATURE: 97.1 F | RESPIRATION RATE: 16 BRPM | WEIGHT: 202 LBS | HEART RATE: 75 BPM | SYSTOLIC BLOOD PRESSURE: 108 MMHG | HEIGHT: 68 IN

## 2020-10-20 DIAGNOSIS — L08.9 DIABETIC FOOT INFECTION (HCC): ICD-10-CM

## 2020-10-20 DIAGNOSIS — T14.8XXA WOUND INFECTION: ICD-10-CM

## 2020-10-20 DIAGNOSIS — E11.628 DIABETIC FOOT INFECTION (HCC): ICD-10-CM

## 2020-10-20 DIAGNOSIS — N17.9 AKI (ACUTE KIDNEY INJURY) (HCC): ICD-10-CM

## 2020-10-20 DIAGNOSIS — T81.89XD NONHEALING SURGICAL WOUND, SUBSEQUENT ENCOUNTER: Primary | ICD-10-CM

## 2020-10-20 DIAGNOSIS — E11.65 TYPE 2 DIABETES MELLITUS WITH HYPERGLYCEMIA, WITHOUT LONG-TERM CURRENT USE OF INSULIN (HCC): ICD-10-CM

## 2020-10-20 DIAGNOSIS — E11.65 TYPE 2 DIABETES MELLITUS WITH HYPERGLYCEMIA, WITH LONG-TERM CURRENT USE OF INSULIN (HCC): ICD-10-CM

## 2020-10-20 DIAGNOSIS — A49.9 POLYMICROBIAL BACTERIAL INFECTION: ICD-10-CM

## 2020-10-20 DIAGNOSIS — L08.9 WOUND INFECTION: ICD-10-CM

## 2020-10-20 DIAGNOSIS — Z79.4 TYPE 2 DIABETES MELLITUS WITH HYPERGLYCEMIA, WITH LONG-TERM CURRENT USE OF INSULIN (HCC): ICD-10-CM

## 2020-10-20 DIAGNOSIS — E87.5 HYPERKALEMIA: ICD-10-CM

## 2020-10-20 DIAGNOSIS — M86.071 ACUTE HEMATOGENOUS OSTEOMYELITIS OF RIGHT FOOT (HCC): ICD-10-CM

## 2020-10-20 PROCEDURE — 11043 DBRDMT MUSC&/FSCA 1ST 20/<: CPT | Performed by: NURSE PRACTITIONER

## 2020-10-20 PROCEDURE — 99214 OFFICE O/P EST MOD 30 MIN: CPT | Performed by: NURSE PRACTITIONER

## 2020-10-20 PROCEDURE — 11043 DBRDMT MUSC&/FSCA 1ST 20/<: CPT

## 2020-10-20 PROCEDURE — 11042 DBRDMT SUBQ TIS 1ST 20SQCM/<: CPT

## 2020-10-20 PROCEDURE — 97605 NEG PRS WND THER DME<=50SQCM: CPT

## 2020-10-20 ASSESSMENT — ENCOUNTER SYMPTOMS
WEAKNESS: 0
NAUSEA: 0
DOUBLE VISION: 0
BLURRED VISION: 0
ABDOMINAL PAIN: 0
WEIGHT LOSS: 1
HEADACHES: 0
WEAKNESS: 0
COUGH: 0
SORE THROAT: 0
CHILLS: 0
NERVOUS/ANXIOUS: 0
WHEEZING: 0
DIARRHEA: 0
DIZZINESS: 0
NAUSEA: 0
FEVER: 0
HEADACHES: 0
DIZZINESS: 0
VOMITING: 0
FEVER: 0
MYALGIAS: 1
VOMITING: 0
SHORTNESS OF BREATH: 0
SHORTNESS OF BREATH: 0
CHILLS: 0
PALPITATIONS: 0

## 2020-10-20 ASSESSMENT — PAIN SCALES - GENERAL: PAINLEVEL: 3=SLIGHT PAIN

## 2020-10-20 ASSESSMENT — FIBROSIS 4 INDEX: FIB4 SCORE: 0.98

## 2020-10-20 NOTE — PROGRESS NOTES
Infectious Disease Clinic    Subjective:     Chief Complaint   Patient presents with   • Hospital Follow-up     osteomyelitis     This is my first time meeting Mr. Eugene.      Interval History: 54-year-old male with a PMH of diabetes, s/p right fifth toe amputation on 08/06/2020 and congenital kidney anomaly (solitary kidney).    Hospitalized from 9/8-9/12/2020, admitted due to worsening erythema and ischemic necrosis of the right fifth metatarsal amputation site, as well as the distal tip of the right fourth digit.  Patient had undergone a right fifth digit amputation on 8/6, arterial flow at that time showed a clot near the fourth toe, no prevascular procedure was done at that point.  MRI of the foot on 9/8 showed osteomyelitis at the amputation site, as well as the distal metatarsal of the fourth digit.  On 9/9, he underwent right fifth toe wound dehiscence I&D with direct closure by Dr. Gutierrez.  OR bone culture +MSSA, diphtheroids, Strep Viridans, Haemophilus parainfluenzae and  Bacteroides uniformis, pathology not sent for margin.  Back in August 2020, patient had grown MSSA and Acinetobacter.  Discharged home on p.o. Bactrim 2 DS tabs twice a day and p.o. Bactrim 875/125 twice daily x6 weeks, estimated end date 10/20/2020.    10/14/2028: Labs showing hyperkalemia at 6.4 and new PARIS with creatinine 2.17, patient with solitary kidney.  Patient also noted rash under armpits that were treated with a fungal medication with minimal improvement.  Due to high potassium and solitary kidney, patient was directed to be evaluated in the ER.     Hospital records reviewed    Today, 10/20/2020: Patient reports feeling well and has been tolerating the p.o. Augmentin without adverse effect.  Stop taking the p.o. Bactrim on 10/14 as directed by ID due to hyperkalemia and elevated creatinine.  Says that he has been drinking a lot of water to help flush his kidneys, also felt that he was dehydrated as he noticed a decrease  in his urinary output, which has been slowly improving.  Denies feeling generally ill, fevers/chills, general malaise, headache, n/v/d, abdominal pain, chest pain or shortness of breath.  States that he had a rash to his underarms from the hospitalization, was given an antifungal by his PCP that did not really help.  He then developed a topical rash to his arms and back a couple of weeks ago, it has been improving with cortisone lotion.  He reports occasional itching, but nothing that is intolerable.  He is seen at the wound clinic twice a week for wound VAC changes; however, wound VAC is not on today as it came off yesterday, he has an appointment with wound care today at 930.  He states there has been minimal drainage from the right foot without any odor.  He states that he has minimal soreness to the right foot, it is improved with rest and elevation, exacerbated with a lot of ambulation.  Was seen by Dr. Gutierrez a couple of weeks ago, has another follow-up next week, was told that he is happy with the progress.  Patient notes that he did have some lightheadedness early last week; however, since stopping his lisinopril he has not had that issue and felt that it was related to low blood pressures.  Blood sugar was 107 this a.m.  States he is working on a diet to improve his sugars.      Review of Systems   Constitutional: Negative for chills, fever and malaise/fatigue.   HENT: Negative for sore throat.    Respiratory: Negative for cough and shortness of breath.    Cardiovascular: Positive for leg swelling. Negative for chest pain.   Gastrointestinal: Negative for abdominal pain, diarrhea, nausea and vomiting.   Genitourinary: Negative for dysuria.        Decreased urine output, slowly improving   Musculoskeletal: Positive for myalgias. Negative for joint pain.   Skin: Positive for itching and rash (Improving).   Neurological: Negative for dizziness (Resolved off lisinopril), weakness and headaches.  "  Psychiatric/Behavioral: The patient is not nervous/anxious.        Past Medical History:   Diagnosis Date   • Diabetes (HCC)    • Kidney anomaly, congenital     born with 1 kidney       Family History   Problem Relation Age of Onset   • Lung Disease Mother    • Cancer Mother        Social History     Tobacco Use   • Smoking status: Former Smoker   • Smokeless tobacco: Never Used   Substance Use Topics   • Alcohol use: Not Currently   • Drug use: Yes     Comment: marijuana vape       Allergies: Bactrim ds    Pt's medication and problem list reviewed.     Objective:     /70 (BP Location: Left arm, Patient Position: Sitting, BP Cuff Size: Adult)   Pulse 75   Temp 36.2 °C (97.1 °F) (Temporal)   Resp 16   Ht 1.727 m (5' 8\") Comment: patient reported  Wt 91.6 kg (202 lb)   SpO2 96%   BMI 30.71 kg/m²     Physical Exam   Constitutional: He is oriented to person, place, and time and well-developed, well-nourished, and in no distress. No distress.   HENT:   Head: Normocephalic and atraumatic.   Eyes: Pupils are equal, round, and reactive to light. Conjunctivae and EOM are normal. No scleral icterus.   Neck: Normal range of motion. Neck supple. No JVD present. No tracheal deviation present.   Cardiovascular: Normal rate, regular rhythm, normal heart sounds and intact distal pulses.   No murmur heard.  Pulmonary/Chest: Effort normal and breath sounds normal. No respiratory distress. He has no wheezes. He has no rales.   Abdominal: Soft. Bowel sounds are normal. He exhibits no distension. There is no abdominal tenderness. There is no rebound and no guarding.   Musculoskeletal:         General: Edema (Trace RLE) present. No tenderness.      Comments: Right fifth toe amp site-pinky size wound, wound bed pink with yellow exudate, no odor, minimal serous drainage, trace erythema to surrounding tissue, nontender to palpation.    Right fourth toe tip-pinky size ulceration that is blackened without any active drainage, " no erythema to surrounding tissue, nontender to palpation.   Neurological: He is alert and oriented to person, place, and time. No cranial nerve deficit.   Scooter used for ambulatory assistance.  Also wearing walking boot to RLE.   Skin: Skin is warm and dry. Rash (Bilateral underarms, shoulders, upper back) noted. He is not diaphoretic. There is erythema.   Psychiatric: Mood, memory, affect and judgment normal.   Pleasant   Vitals reviewed.      Labs:  WBC   Date/Time Value Ref Range Status   10/14/2020 09:07 PM 9.1 4.8 - 10.8 K/uL Final     RBC   Date/Time Value Ref Range Status   10/14/2020 09:07 PM 4.17 (L) 4.70 - 6.10 M/uL Final     Hemoglobin   Date/Time Value Ref Range Status   10/14/2020 09:07 PM 12.6 (L) 14.0 - 18.0 g/dL Final     Hematocrit   Date/Time Value Ref Range Status   10/14/2020 09:07 PM 37.4 (L) 42.0 - 52.0 % Final     MCV   Date/Time Value Ref Range Status   10/14/2020 09:07 PM 89.7 81.4 - 97.8 fL Final     MCH   Date/Time Value Ref Range Status   10/14/2020 09:07 PM 30.2 27.0 - 33.0 pg Final     MCHC   Date/Time Value Ref Range Status   10/14/2020 09:07 PM 33.7 33.7 - 35.3 g/dL Final     MPV   Date/Time Value Ref Range Status   10/14/2020 09:07 PM 9.8 9.0 - 12.9 fL Final        Sodium   Date/Time Value Ref Range Status   10/19/2020 12:40  135 - 145 mmol/L Final     Potassium   Date/Time Value Ref Range Status   10/19/2020 12:40 PM 5.9 (H) 3.6 - 5.5 mmol/L Final     Chloride   Date/Time Value Ref Range Status   10/19/2020 12:40  96 - 112 mmol/L Final     Co2   Date/Time Value Ref Range Status   10/19/2020 12:40 PM 22 20 - 33 mmol/L Final     Glucose   Date/Time Value Ref Range Status   10/19/2020 12:40 PM 88 65 - 99 mg/dL Final     Bun   Date/Time Value Ref Range Status   10/19/2020 12:40 PM 28 (H) 8 - 22 mg/dL Final     Creatinine   Date/Time Value Ref Range Status   10/19/2020 12:40 PM 1.31 0.50 - 1.40 mg/dL Final       Alkaline Phosphatase   Date/Time Value Ref Range Status    10/19/2020 12:40 PM 58 30 - 99 U/L Final     AST(SGOT)   Date/Time Value Ref Range Status   10/19/2020 12:40 PM 27 12 - 45 U/L Final     ALT(SGPT)   Date/Time Value Ref Range Status   10/19/2020 12:40 PM 34 2 - 50 U/L Final     Total Bilirubin   Date/Time Value Ref Range Status   10/19/2020 12:40 PM 0.2 0.1 - 1.5 mg/dL Final        Assessment and Plan:   The following treatment plan was discussed with patient at length:    1. Diabetic foot infection (HCC)  Basic Metabolic Panel    -Finish p.o. Augmentin (has about 7 days left).  No additional antibiotics to follow as there are no gross signs of infection.  -Monitor for s/sx of worsening off abx: increased redness, pain, swelling, drainage, breakdown of surgical site, fevers, chills, general malaise, etc.  Notify ID or go to ER should these s/sx occur.  -Continue with wound care as directed.  -Follow-up with Dr. Mallory as directed.  -Repeat BMP in 1 week to ensure that creatinine levels have continued to improve along with hyperkalemia.   2. Acute hematogenous osteomyelitis of right foot (HCC)      As above   3. Polymicrobial bacterial infection: MSSA, Strep Viridans, Haemophilus parainfluenzae and  Bacteroides uniformis      Antibiotics as above   4. Hyperkalemia  Basic Metabolic Panel    BMP as above   5. PARIS (acute kidney injury) (HCC)      BMP as above.   6. Type 2 diabetes mellitus with hyperglycemia, with long-term current use of insulin (HCC)      Keep blood sugar under 150 to promote wound healing and control infection     Follow up: PRN, RTC sooner if needed. FU with PCP for ongoing chronic medical conditions.     NANCY Springer.        Please note that this dictation was created using voice recognition software. I have  worked with technical experts from [employer] to optimize the interface.  I have made every reasonable attempt to correct obvious errors, but there may be errors of grammar and possibly content that I did not discover  before finalizing the note.

## 2020-10-20 NOTE — PROGRESS NOTES
Provider Encounter- Full Thickness wound    HISTORY OF PRESENT ILLNESS  Wound History:    START OF CARE IN CLINIC: 9/20/2020    REFERRING PROVIDER: Edith Kaminski D.O.      WOUND- Full Thickness Wound   LOCATION:    HISTORY: Patient was admitted to [facility] on 9/8/2020 patient had a ischemic toe injury with necrosis/osteo-myelitis requiring amputation of the fifth toe he was readmitted due to worsening pain and redness of the surgical wound site despite being initiated on oral antibiotics.  MRI revealed osteomyelitis of the distal MTP joint and of the fourth and fifth digits.  He underwent surgical procedure debridement of previous surgical site by Dr. Mallory.  His wound was cultured while in the hospital and grew out MSSA, diphtheroids and strep viridans.  Prior to discharge she was placed on 6 weeks of oral antibiotics.  This included Augmentin and Bactrim DS through 10/29/2020.  He is a newly diagnosed diabetic recently started on insulin.  His A1c was approximately 10.5.  He apparently had good glucose control over the last 6 weeks and has dropped nearly 2 points in his A1c.  At this time he reports that he is no longer taking insulin because he is controlled by his diet.    Pertinent Medical History: Diabetes type 2, hypertension, osteomyelitis    TOBACCO USE: Former smoker    Patient's problem list, allergies, and current medications reviewed and updated in Epic    Interval History:  9/22/2020: Clinic visit with MALINDA Aponte. Patient states that they are feeling well today.  Patient denies fever, chills, nausea, vomiting, lightheadedness, dizziness, shortness of breath and chest pain.  Removal of sutures.  Underlining tissue nonviable, removed through debridement.       9/29/2020: Clinic visit with MALINDA Aponte. Patient states that they are feeling well today.  Patient denies fever, chills, nausea, vomiting, lightheadedness, dizziness, shortness of breath and  chest pain.  We will put in for approval for wound VAC and initiate next Tuesday and next clinic appointment if approved.      10/13/2020: Clinic visit with MALINDA Aponte. Patient states that they are feeling well today.  Patient denies fever, chills, nausea, vomiting, lightheadedness, dizziness, shortness of breath and chest pain.  Wound VAC therapy initiated to right lateral wound.    10/20/2020: Clinic visit with MALINDA Aponte. Patient states that they are feeling well today.  Patient denies fever, chills, nausea, vomiting, lightheadedness, dizziness, shortness of breath and chest pain.  Patient is started to have some granulation tissue forming over the tendon to his right foot.  Trimmed edges to eschar on toe decreasing in size slowly.        REVIEW OF SYSTEMS:   Review of Systems   Constitutional: Positive for weight loss. Negative for chills and fever.        Intentional weight loss   Eyes: Negative for blurred vision and double vision.   Respiratory: Negative for shortness of breath and wheezing.    Cardiovascular: Negative for chest pain, palpitations and leg swelling.   Gastrointestinal: Negative for nausea and vomiting.   Skin: Negative for itching and rash.        Surgical wound right lateral foot  Eschar Right third toe   Neurological: Negative for dizziness, weakness and headaches.       PHYSICAL EXAMINATION:   /75   Pulse 95   Temp 36.3 °C (97.3 °F)   Resp 20   SpO2 97%     Physical Exam   Constitutional: He is oriented to person, place, and time and well-developed, well-nourished, and in no distress.   HENT:   Head: Normocephalic.   Eyes: Pupils are equal, round, and reactive to light. Conjunctivae are normal.   Cardiovascular: Intact distal pulses.   Pulmonary/Chest: Effort normal and breath sounds normal. He has no wheezes.   Musculoskeletal:         General: Tenderness present.   Neurological: He is alert and oriented to person, place, and time.   Skin: Skin is warm. No  rash noted. No pallor.   Surgical wound right lateral foot  Eschar Right third toe decreasing in size edges trimmed  Significant amount of callus to right foot         WOUND ASSESSMENT        Negative Pressure Wound Therapy 10/13/20 (Active)   NPWT Pump Mode / Pressure Setting Continuous;125 mmHg 10/20/20 0930   Dressing Type Black Foam (Regular) 10/20/20 0930   Number of Foam Pieces Used 1 10/20/20 0930   Canister Changed No 10/20/20 0930           Wound 08/01/20 Diabetic Ulcer Foot Plantar Right between 1-2 toes callus with wound (Active)       Wound 08/06/20 Incision Toe, 5th Right (Active)       Wound 09/08/20 Diabetic Ulcer Toe, 4th Right POA (Active)       Wound 09/09/20 Incision Foot Right (Active)       Wound 09/22/20 Incision Toe, 5th Right R 5th toe amputation site dehisced incision (Active)   Wound Image    10/20/20 0930   Site Assessment Pink;Yellow;White 10/20/20 0930   Periwound Assessment Other (Comment) 10/20/20 0930   Margins Attached edges 10/20/20 0930   Closure Secondary intention 10/20/20 0930   Drainage Amount Small 10/20/20 0930   Drainage Description Serosanguineous 10/20/20 0930   Treatments Cleansed;Topical Lidocaine;Provider debridement 10/20/20 0930   Wound Cleansing Puracyn Angwin 10/20/20 0930   Periwound Protectant Drape;Skin Protectant Wipes to Periwound 10/20/20 0930   Dressing Cleansing/Solutions Not Applicable 10/20/20 0930   Dressing Options Collagen Dressing;Wound Vac 10/20/20 0930   Dressing Changed Changed 10/20/20 0930   Dressing Status Clean;Dry;Intact 10/20/20 0930   Dressing Change/Treatment Frequency Weekly, and As Needed 09/22/20 1000   Non-staged Wound Description Full thickness 10/20/20 0930   Wound Length (cm) 1.3 cm 10/20/20 0930   Wound Width (cm) 0.9 cm 10/20/20 0930   Wound Depth (cm) 0.5 cm 10/20/20 0930   Wound Surface Area (cm^2) 1.17 cm^2 10/20/20 0930   Wound Volume (cm^3) 0.58 cm^3 10/20/20 0930   Post-Procedure Length (cm) 1.3 cm 10/20/20 4931    Post-Procedure Width (cm) 1 cm 10/20/20 0930   Post-Procedure Depth (cm) 0.7 cm 10/20/20 0930   Post-Procedure Surface Area (cm^2) 1.3 cm^2 10/20/20 0930   Post-Procedure Volume (cm^3) 0.91 cm^3 10/20/20 0930   Wound Healing % -164 10/20/20 0930   Wound Bed Granulation (%) 40 % 09/22/20 1000   Wound Bed Slough (%) 60 % 09/22/20 1000   Wound Bed Granulation (%) - Post-Procedure 100 % 09/22/20 1000   Tunneling (cm) 0 cm 10/20/20 0930   Undermining (cm) 0 cm 10/20/20 0930   Wound Odor None 10/20/20 0930   Pulses Not palpable;Doppler;DP;PT;Other (Comments) 09/22/20 1000   Right Foot Monofilament 10-point exam (Sensate) 10/10 09/22/20 1000   Exposed Structures Tendon 10/20/20 0930       Wound 09/22/20 Diabetic Ulcer Toe, 4th Distal;Plantar Right R distal plantar 4th toe DFU, eschar (Active)   Wound Image    10/20/20 0930   Site Assessment Dry;Brown 10/20/20 0930   Periwound Assessment Clean;Dry;Intact 10/20/20 0930   Margins Other (Comment) 10/20/20 0930   Closure Open to air 10/20/20 0930   Drainage Amount None 10/20/20 0930   Treatments Provider debridement;Site care 10/13/20 0900   Wound Cleansing Not Applicable 10/20/20 0930   Periwound Protectant Not Applicable 10/20/20 0930   Dressing Cleansing/Solutions 3% Betadine 10/20/20 0930   Dressing Options Open to Air 10/20/20 0930   Dressing Status Open to Air 10/20/20 0930   Wound Length (cm) 0.6 cm 09/29/20 0800   Wound Width (cm) 1 cm 09/29/20 0800   Wound Depth (cm) 0 cm 09/22/20 1000   Wound Surface Area (cm^2) 0.6 cm^2 09/29/20 0800   Wound Volume (cm^3) 0 cm^3 09/22/20 1000   Wound Bed Eschar (%) 100 % 10/13/20 0900   Wound Odor None 10/13/20 0900   Right Foot Monofilament 10-point exam (Sensate) 10/10 09/22/20 1000   Exposed Structures None 10/13/20 0900            PROCEDURE:   -2% viscous lidocaine applied topically to wound bed for approximately 5 minutes prior to debridement  -Curette used to debride wound bed.  Excisional debridement was performed to remove  devitalized tissue until healthy, bleeding tissue was visualized.   Entire surface of wound, 1.3 cm2 debrided.  Tissue debrided into the muscle/fascia layer.   -Bleeding controlled with manual pressure.    -Wound care completed by wound RN, refer to flowsheet  -Patient tolerated the procedure well, without c/o pain or discomfort.       Pertinent Labs and Diagnostics:    Labs:     A1c:   Lab Results   Component Value Date/Time    HBA1C 8.3 (H) 09/08/2020 02:22 PM          IMAGING: MRI Foot Dated 9/8/2020    FINDINGS:     There has been previous 5th digit amputation.     There appears to be an overlying open wound.        BONE AND MARROW: There is mild bone marrow edema within the 5th metatarsal head consistent with osteomyelitis. Also, there is bone marrow edema in the 4th metatarsal head consistent with osteomyelitis.     There is 1st metatarsophalangeal joint osteoarthritis with associated subchondral edema in the distal aspect of the 1st proximal phalanx of the proximal aspect of the 1st distal phalanx     MUSCLES: The muscular compartment is normal in appearance.     LIGAMENTS and TENDONS: The visualized ligament and tendon are normal in appearance.     MISCELLANEOUS: No fluid collection or mass.       VASCULAR STUDIES: US-[place] Single Level    RIGHT      Waveform            Systolic BPs (mmHg)                              146           Brachial   Triphasic                                Common Femoral   Triphasic                  138           Posterior Tibial   Triphasic                  144           Dorsalis Pedis                                            Peroneal                              0.99          [place]                                            TBI                           LEFT   Waveform        Systolic BPs (mmHg)                              141           Brachial   Triphasic                                Common Femoral   Triphasic                  166           Posterior Tibial   Triphasic                   156           Dorsalis Pedis                                            Peroneal                              1.14          [place]                                            TBI    LAST  WOUND CULTURE:  DATE : 9/9/2020      Component 13d ago   Significant Indicator POSPositive (POS)    Source BONE    Site Right Fifth Toe    Culture Result -Abnormal     Culture Result Abnormal   Bacteroides uniformis   Rare growth                       ASSESSMENT AND PLAN:   1. Non-healing surgical wound, subsequent encounter    -Excisional debridement of wound today in clinic. Rolled edges taken down to stimulate granulation tissue formation prior to placement of wound VAC.  Tendon is visible to wound bed, some improvement with granulation tissue starting to form over tendon.  Wound has considerable depth this is the reason for continued use of wound VAC.  -Patient to return to clinic weekly for assessment and debridement  -Patient to return to clinic twice weekly for wound VAC dressing changes 1 time a week for assessment and debridement   Wound Care: Adaptic over tendon, placement of wound VAC    Patient has an eschar cap to the distal aspect of his fourth right toe (edges trimmed) was painted with Betadine in clinic today.  Area of eschar is slowly decreasing, still firm.    2. Type 2 diabetes mellitus with hyperglycemia, without long-term current use of insulin (HCC)  Patient instructed to keep tight control over FBS, <140 for optimal wound healing; Implications of loss of protective sensation (LOPS) discussed with patient, including increased risk for amputation.  Advised to check feet at least daily, moisturize feet, and to always wear protective foot wear, arrange meticulous regular foot care by podiatrist or [facility]. Pt with good understanding.   -Patient reports that her blood sugar in clinic today was 107    3. Wound infection  Wound culture on 9/9/2020+ for bacteroides uniformis  Patient is currently on Bactrim and  Augmentin with end date 10/20/2020      PATIENT EDUCATION  - Importance of adequate nutrition for wound healing  -Advised to go to ER for any increased redness, swelling, drainage, or odor, or if patient develops fever, chills, nausea or vomiting.           Please note that this note may have been created using voice recognition software. I have worked with technical experts from [facility] to optimize the interface.  I have made every reasonable attempt to correct obvious errors, but there may be errors of grammar and possibly content that I did not discover before finalizing the note.    N

## 2020-10-22 ENCOUNTER — OFFICE VISIT (OUTPATIENT)
Dept: MEDICAL GROUP | Facility: LAB | Age: 55
End: 2020-10-22
Payer: COMMERCIAL

## 2020-10-22 VITALS
DIASTOLIC BLOOD PRESSURE: 68 MMHG | WEIGHT: 203.2 LBS | TEMPERATURE: 96.8 F | HEIGHT: 68 IN | HEART RATE: 100 BPM | OXYGEN SATURATION: 95 % | BODY MASS INDEX: 30.8 KG/M2 | SYSTOLIC BLOOD PRESSURE: 124 MMHG | RESPIRATION RATE: 12 BRPM

## 2020-10-22 DIAGNOSIS — Z79.4 TYPE 2 DIABETES MELLITUS WITH HYPERGLYCEMIA, WITH LONG-TERM CURRENT USE OF INSULIN (HCC): ICD-10-CM

## 2020-10-22 DIAGNOSIS — E11.65 TYPE 2 DIABETES MELLITUS WITH HYPERGLYCEMIA, WITH LONG-TERM CURRENT USE OF INSULIN (HCC): ICD-10-CM

## 2020-10-22 DIAGNOSIS — E55.9 VITAMIN D DEFICIENCY: ICD-10-CM

## 2020-10-22 DIAGNOSIS — N17.9 ACUTE RENAL FAILURE, UNSPECIFIED ACUTE RENAL FAILURE TYPE (HCC): ICD-10-CM

## 2020-10-22 DIAGNOSIS — M86.171 OSTEOMYELITIS OF ANKLE OR FOOT, ACUTE, RIGHT (HCC): ICD-10-CM

## 2020-10-22 PROCEDURE — 99214 OFFICE O/P EST MOD 30 MIN: CPT | Performed by: INTERNAL MEDICINE

## 2020-10-22 RX ORDER — AMOXICILLIN AND CLAVULANATE POTASSIUM 875; 125 MG/1; MG/1
1 TABLET, FILM COATED ORAL 2 TIMES DAILY
COMMUNITY
End: 2020-10-23 | Stop reason: SDUPTHER

## 2020-10-22 RX ORDER — ERGOCALCIFEROL 1.25 MG/1
50000 CAPSULE ORAL
Qty: 5 CAP | Refills: 1 | Status: SHIPPED | OUTPATIENT
Start: 2020-10-22 | End: 2020-10-23 | Stop reason: SDUPTHER

## 2020-10-22 ASSESSMENT — FIBROSIS 4 INDEX: FIB4 SCORE: 0.98

## 2020-10-22 NOTE — PROGRESS NOTES
CC: Mc Eugene is a 55 y.o. male is suffering from   Chief Complaint   Patient presents with   • Diabetes     1 month follow up   • Amputation     wound check. right foot         SUBJECTIVE:  1. Vitamin D deficiency  Cristopher is here for follow-up has a history of vitamin D deficiency, is to be on 50,000 international units once weekly recheck vitamin D at 1 month    2. Type 2 diabetes mellitus with hyperglycemia, with long-term current use of insulin (HCC)  History of type 2 diabetes with improved control recently.    3. Osteomyelitis of ankle or foot, acute, right (HCC)  Osteomyelitis right foot with previous right little toe amputation clinically stable wound VAC in place    4. Acute renal failure, unspecified acute renal failure type (HCC)  Acute renal failure with significant improvement status post removal of Bactrim from treatment regimen        Past social, family, history: , employed at [employer]  Social History     Tobacco Use   • Smoking status: Former Smoker   • Smokeless tobacco: Never Used   Substance Use Topics   • Alcohol use: Not Currently   • Drug use: Yes     Comment: marijuana vape         MEDICATIONS:    Current Outpatient Medications:   •  amoxicillin-clavulanate (AUGMENTIN) 875-125 MG Tab, Take 1 Tab by mouth 2 times a day., Disp: , Rfl:   •  vitamin D, Ergocalciferol, (DRISDOL) 1.25 MG (83204 UT) Cap capsule, Take 1 Cap by mouth every 7 days., Disp: 5 Cap, Rfl: 1  •  Insulin Lispro (HUMALOG KWIKPEN) 200 UNIT/ML Solution Pen-injector, Inject 1-6 Units as instructed 2 times a day, with meals. Sliding Scale 151- 200 = 1 unit 201- 250 = 2 units 251-300 = 3 units 301-350 = 4 units 351-400 = 5 units Over 400 = 6 units, Disp: , Rfl:   •  glucose blood (ACCU-CHEK GUIDE) strip, 1 Strip by Other route 3 times a day. Use one strip each time to test blood sugar three times a day (Patient not taking: Reported on 10/20/2020), Disp: 100 Strip, Rfl: 1  •  ketoconazole (NIZORAL) 2 % Cream,  "Apply 0.5 g to affected area(s) every day., Disp: 30 g, Rfl: 1    PROBLEMS:  Patient Active Problem List    Diagnosis Date Noted   • Diabetic foot infection (HCC) 08/01/2020     Priority: High   • Leukocytosis 09/08/2020     Priority: Medium   • Essential hypertension 08/03/2020     Priority: Medium   • Type 2 diabetes mellitus with hyperglycemia, with long-term current use of insulin (HCC) 08/01/2020     Priority: Medium   • Class 1 obesity due to excess calories without serious comorbidity with body mass index (BMI) of 33.0 to 33.9 in adult 08/01/2020     Priority: Low       REVIEW OF SYSTEMS:  Gen.:  No Nausea, Vomiting, fever, Chills.  Heart: No chest pain.  Lungs:  No shortness of Breath.  Psychological: Vincenzo unusual Anxiety depression     PHYSICAL EXAM   Constitutional: Alert, cooperative, not in acute distress.  Cardiovascular:  Rate Rhythm is regular without murmurs rubs clicks.     Thorax & Lungs: Clear to auscultation, no wheezing, rhonchi, or rales  HENT: Normocephalic, Atraumatic.  Eyes: PERRLA, EOMI, Conjunctiva normal.   Neck: Trachia is midline no swelling of the thyroid.   Lymphatic: No lymphadenopathy noted.   Musculoskeletal: Right little toe amputation wound VAC in place  Neurologic: Alert & oriented x 3, cranial nerves II through XII are intact, Normal motor function, Normal sensory function, No focal deficits noted.   Psychiatric: Affect normal, Judgment normal, Mood normal.     VITAL SIGNS:/68   Pulse 100   Temp 36 °C (96.8 °F) (Temporal)   Resp 12   Ht 1.727 m (5' 8\")   Wt 92.2 kg (203 lb 3.2 oz)   SpO2 95%   BMI 30.90 kg/m²     Labs: Reviewed    Assessment:                                                     Plan:    1. Vitamin D deficiency  Start vitamin D immediately 50,000 international units recheck level 1 month  - vitamin D, Ergocalciferol, (DRISDOL) 1.25 MG (27334 UT) Cap capsule; Take 1 Cap by mouth every 7 days.  Dispense: 5 Cap; Refill: 1  - VITAMIN D,25 HYDROXY; " Future    2. Type 2 diabetes mellitus with hyperglycemia, with long-term current use of insulin (HCC)  Type 2 diabetes with improved control discussed with the patient that this decreases of ability of wounds to heal    3. Osteomyelitis of ankle or foot, acute, right ([facility])  Continue to follow-up with wound care nursing    4. Acute renal failure, unspecified acute renal failure type ([facility])  Stable, patient is to follow-up with nephrology next month

## 2020-10-23 ENCOUNTER — NON-PROVIDER VISIT (OUTPATIENT)
Dept: WOUND CARE | Facility: MEDICAL CENTER | Age: 55
End: 2020-10-23
Attending: INTERNAL MEDICINE
Payer: COMMERCIAL

## 2020-10-23 VITALS
HEART RATE: 95 BPM | OXYGEN SATURATION: 95 % | RESPIRATION RATE: 20 BRPM | TEMPERATURE: 98.7 F | DIASTOLIC BLOOD PRESSURE: 89 MMHG | SYSTOLIC BLOOD PRESSURE: 157 MMHG

## 2020-10-23 DIAGNOSIS — E55.9 VITAMIN D DEFICIENCY: ICD-10-CM

## 2020-10-23 PROCEDURE — 97605 NEG PRS WND THER DME<=50SQCM: CPT

## 2020-10-23 RX ORDER — AMOXICILLIN AND CLAVULANATE POTASSIUM 875; 125 MG/1; MG/1
1 TABLET, FILM COATED ORAL 2 TIMES DAILY
Qty: 20 TAB | Refills: 0 | Status: SHIPPED | OUTPATIENT
Start: 2020-10-23 | End: 2020-10-29

## 2020-10-23 RX ORDER — ERGOCALCIFEROL 1.25 MG/1
50000 CAPSULE ORAL
Qty: 5 CAP | Refills: 1 | Status: SHIPPED
Start: 2020-10-23 | End: 2021-07-20

## 2020-10-24 NOTE — PROCEDURES
NPWT VAC dressing was changed, with one piece of black foam to wound bed, one piece for tracpad (2 black total ). Pump set to neg 125mmhg continuous, no leaks noted.

## 2020-10-24 NOTE — PATIENT INSTRUCTIONS
Should you experience any significant changes in your wound(s) such as infection (redness, swelling, localized heat, increased pain, fever >101 F, chills) or have any questions regarding your home care instructions, please contact the wound center [phone]. If after hours, contact your primary care physician or go the hospital emergency room.  Keep dressing clean and dry and cover while bathing. Only change dressing if over saturated, soiled or its falling off.       Reviewed [employer] VAC, mechanisms of action, troubleshooting,how to patch a leak, how to change cannister,  remove dressing and apply moist gauze dressing if suction is lost and dressing fails and cannot be patched. Pt has good understanding.

## 2020-10-24 NOTE — TELEPHONE ENCOUNTER
1. Caller Name: Mc Eugene                          Call Back Number: [phone] (home)         How would the patient prefer to be contacted with a response: Phone call do NOT leave a detailed message    Patient called and LVM in regards to his prescription being sent to [facility] will not fill them.

## 2020-10-26 ENCOUNTER — OFFICE VISIT (OUTPATIENT)
Dept: WOUND CARE | Facility: MEDICAL CENTER | Age: 55
End: 2020-10-26
Attending: INTERNAL MEDICINE
Payer: COMMERCIAL

## 2020-10-26 VITALS
DIASTOLIC BLOOD PRESSURE: 84 MMHG | HEART RATE: 110 BPM | OXYGEN SATURATION: 95 % | SYSTOLIC BLOOD PRESSURE: 134 MMHG | RESPIRATION RATE: 16 BRPM | TEMPERATURE: 97.6 F

## 2020-10-26 DIAGNOSIS — L08.9 WOUND INFECTION: ICD-10-CM

## 2020-10-26 DIAGNOSIS — T81.89XD NONHEALING SURGICAL WOUND, SUBSEQUENT ENCOUNTER: Primary | ICD-10-CM

## 2020-10-26 DIAGNOSIS — E11.65 TYPE 2 DIABETES MELLITUS WITH HYPERGLYCEMIA, WITHOUT LONG-TERM CURRENT USE OF INSULIN (HCC): ICD-10-CM

## 2020-10-26 DIAGNOSIS — T14.8XXA WOUND INFECTION: ICD-10-CM

## 2020-10-26 PROCEDURE — 97605 NEG PRS WND THER DME<=50SQCM: CPT

## 2020-10-26 PROCEDURE — 11042 DBRDMT SUBQ TIS 1ST 20SQCM/<: CPT | Performed by: NURSE PRACTITIONER

## 2020-10-26 PROCEDURE — 11042 DBRDMT SUBQ TIS 1ST 20SQCM/<: CPT

## 2020-10-26 ASSESSMENT — ENCOUNTER SYMPTOMS
DIZZINESS: 0
DOUBLE VISION: 0
WEAKNESS: 0
NAUSEA: 0
FEVER: 0
WHEEZING: 0
CHILLS: 0
PALPITATIONS: 0
SHORTNESS OF BREATH: 0
VOMITING: 0
HEADACHES: 0
BLURRED VISION: 0
WEIGHT LOSS: 1

## 2020-10-26 ASSESSMENT — PAIN SCALES - GENERAL: PAINLEVEL: NO PAIN

## 2020-10-26 NOTE — PATIENT INSTRUCTIONS
-Keep your wound dressing clean, dry, and intact.    -Wound vac may not have any drainage in tube or cannister & it will still be working.   Change cannister if it does become full by pressing tab on side of machine to remove canister and snap on new one. Full canister can be thrown in the trash. If cannister fills with bright red blood - go to ER. Dressing will be changed every MWF at the wound clini.  If you are having issues with your wound VAC, please consider patching leaks, changing the canister, or calling [phone] for troubleshooting. If the wound VAC has been off or un-operational for over 2 hours, call wound care center to inform them and remove all dressings including black foam and replace with normal saline damp gauze.     -Should you experience any significant changes in your wound(s), such as infection (redness, swelling, localized heat, increased pain, fever > 101 F, chills) or have any questions regarding your home care instructions, please contact the wound center at [phone]. If after hours, contact your primary care physician or go to the hospital emergency room.

## 2020-10-26 NOTE — PROGRESS NOTES
Provider Encounter- Full Thickness wound    HISTORY OF PRESENT ILLNESS  Wound History:    START OF CARE IN CLINIC: 9/20/2020    REFERRING PROVIDER: Edith Kaminski D.O.      WOUND- Full Thickness Wound   LOCATION:    HISTORY: Patient was admitted to [facility] on 9/8/2020 patient had a ischemic toe injury with necrosis/osteo-myelitis requiring amputation of the fifth toe he was readmitted due to worsening pain and redness of the surgical wound site despite being initiated on oral antibiotics.  MRI revealed osteomyelitis of the distal MTP joint and of the fourth and fifth digits.  He underwent surgical procedure debridement of previous surgical site by Dr. Mallory.  His wound was cultured while in the hospital and grew out MSSA, diphtheroids and strep viridans.  Prior to discharge she was placed on 6 weeks of oral antibiotics.  This included Augmentin and Bactrim DS through 10/29/2020.  He is a newly diagnosed diabetic recently started on insulin.  His A1c was approximately 10.5.  He apparently had good glucose control over the last 6 weeks and has dropped nearly 2 points in his A1c.  At this time he reports that he is no longer taking insulin because he is controlled by his diet.    Pertinent Medical History: Diabetes type 2, hypertension, osteomyelitis    TOBACCO USE: Former smoker    Patient's problem list, allergies, and current medications reviewed and updated in Epic    Interval History:  9/22/2020: Clinic visit with MALINDA Aponte. Patient states that they are feeling well today.  Patient denies fever, chills, nausea, vomiting, lightheadedness, dizziness, shortness of breath and chest pain.  Removal of sutures.  Underlining tissue nonviable, removed through debridement.       9/29/2020: Clinic visit with MALINDA Aponte. Patient states that they are feeling well today.  Patient denies fever, chills, nausea, vomiting, lightheadedness, dizziness, shortness of breath and  chest pain.  We will put in for approval for wound VAC and initiate next Tuesday and next clinic appointment if approved.      10/13/2020: Clinic visit with MALINDA Aponte. Patient states that they are feeling well today.  Patient denies fever, chills, nausea, vomiting, lightheadedness, dizziness, shortness of breath and chest pain.  Wound VAC therapy initiated to right lateral wound.    10/20/2020: Clinic visit with MALINDA Aponte. Patient states that they are feeling well today.  Patient denies fever, chills, nausea, vomiting, lightheadedness, dizziness, shortness of breath and chest pain.  Patient is started to have some granulation tissue forming over the tendon to his right foot.  Trimmed edges to eschar on toe decreasing in size slowly.    10/26/2020: Clinic visit with MALINDA Aponte. Patient states that they are feeling well today.  Patient denies fever, chills, nausea, vomiting, lightheadedness, dizziness, shortness of breath and chest pain.  Eschar to toe continues to decrease firm tissue underneath remaining eschar.  Patient has granulation tissue forming to wound bed of right dorsal foot wound.  Continue with wound VAC at this time.          REVIEW OF SYSTEMS:   Review of Systems   Constitutional: Positive for weight loss. Negative for chills and fever.        Intentional weight loss   Eyes: Negative for blurred vision and double vision.   Respiratory: Negative for shortness of breath and wheezing.    Cardiovascular: Negative for chest pain, palpitations and leg swelling.   Gastrointestinal: Negative for nausea and vomiting.   Skin: Negative for itching and rash.        Surgical wound right lateral foot  Eschar Right third toe   Neurological: Negative for dizziness, weakness and headaches.       PHYSICAL EXAMINATION:   /84 (BP Location: Left arm, Patient Position: Sitting)   Pulse (!) 110   Temp 36.4 °C (97.6 °F) (Temporal)   Resp 16   SpO2 95%     Physical Exam    Constitutional: He is oriented to person, place, and time and well-developed, well-nourished, and in no distress.   HENT:   Head: Normocephalic.   Eyes: Pupils are equal, round, and reactive to light. Conjunctivae are normal.   Cardiovascular: Intact distal pulses.   Pulmonary/Chest: Effort normal and breath sounds normal. He has no wheezes.   Musculoskeletal:         General: Tenderness present.   Neurological: He is alert and oriented to person, place, and time.   Skin: Skin is warm. No rash noted. No pallor.   Surgical wound right lateral foot  Eschar Right third toe decreasing in size edges trimmed  Significant amount of callus to right foot         WOUND ASSESSMENT       Negative Pressure Wound Therapy 10/13/20 (Active)   NPWT Pump Mode / Pressure Setting Continuous;125 mmHg 10/26/20 0800   Dressing Type Black Foam (Regular) 10/26/20 0800   Number of Foam Pieces Used 1 10/26/20 0800   Canister Changed No 10/26/20 0800           Wound 08/01/20 Diabetic Ulcer Foot Plantar Right between 1-2 toes callus with wound (Active)       Wound 08/06/20 Incision Toe, 5th Right (Active)       Wound 09/08/20 Diabetic Ulcer Toe, 4th Right POA (Active)       Wound 09/09/20 Incision Foot Right (Active)       Wound 09/22/20 Incision Toe, 5th Right R 5th toe amputation site dehisced incision (Active)   Wound Image    10/26/20 0800   Site Assessment Pink;Yellow;White 10/26/20 0800   Periwound Assessment Dry;Other (Comment) 10/26/20 0800   Margins Attached edges 10/26/20 0800   Closure Secondary intention 10/26/20 0800   Drainage Amount Small 10/26/20 0800   Drainage Description Serosanguineous 10/26/20 0800   Treatments Cleansed;Topical Lidocaine;Provider debridement;Site care 10/26/20 0800   Wound Cleansing Puracyn Spray 10/26/20 0800   Periwound Protectant Skin Protectant Wipes to Periwound;Drape 10/26/20 0800   Dressing Cleansing/Solutions Not Applicable 10/26/20 0800   Dressing Options Puracyn Gel;Collagen Dressing;Wound Vac  10/26/20 0800   Dressing Changed Changed 10/26/20 0800   Dressing Status Clean;Dry;Intact 10/26/20 0800   Dressing Change/Treatment Frequency Weekly, and As Needed 09/22/20 1000   Non-staged Wound Description Full thickness 10/26/20 0800   Wound Length (cm) 0.9 cm 10/26/20 0800   Wound Width (cm) 0.8 cm 10/26/20 0800   Wound Depth (cm) 0.6 cm 10/26/20 0800   Wound Surface Area (cm^2) 0.72 cm^2 10/26/20 0800   Wound Volume (cm^3) 0.43 cm^3 10/26/20 0800   Post-Procedure Length (cm) 0.9 cm 10/26/20 0800   Post-Procedure Width (cm) 0.9 cm 10/26/20 0800   Post-Procedure Depth (cm) 0.6 cm 10/26/20 0800   Post-Procedure Surface Area (cm^2) 0.81 cm^2 10/26/20 0800   Post-Procedure Volume (cm^3) 0.49 cm^3 10/26/20 0800   Wound Healing % -95 10/26/20 0800   Wound Bed Granulation (%) 40 % 09/22/20 1000   Wound Bed Slough (%) 60 % 09/22/20 1000   Wound Bed Granulation (%) - Post-Procedure 100 % 09/22/20 1000   Tunneling (cm) 0 cm 10/26/20 0800   Undermining (cm) 0 cm 10/26/20 0800   Wound Odor None 10/26/20 0800   Pulses Not palpable;Doppler;DP;PT;Other (Comments) 09/22/20 1000   Right Foot Monofilament 10-point exam (Sensate) 10/10 09/22/20 1000   Exposed Structures Tendon 10/26/20 0800       Wound 09/22/20 Diabetic Ulcer Toe, 4th Distal;Plantar Right R distal plantar 4th toe DFU, eschar (Active)   Wound Image    10/26/20 0800   Site Assessment Dry;Brown 10/26/20 0800   Periwound Assessment Clean;Dry;Intact 10/26/20 0800   Margins Other (Comment) 10/26/20 0800   Closure Open to air 10/26/20 0800   Drainage Amount None 10/26/20 0800   Treatments Provider debridement;Site care 10/26/20 0800   Wound Cleansing Not Applicable 10/26/20 0800   Periwound Protectant Not Applicable 10/26/20 0800   Dressing Cleansing/Solutions 3% Betadine 10/26/20 0800   Dressing Options Open to Air 10/26/20 0800   Dressing Status Open to Air 10/26/20 0800   Wound Length (cm) 0.6 cm 09/29/20 0800   Wound Width (cm) 1 cm 09/29/20 0800   Wound Depth (cm)  0 cm 09/22/20 1000   Wound Surface Area (cm^2) 0.6 cm^2 09/29/20 0800   Wound Volume (cm^3) 0 cm^3 09/22/20 1000   Wound Bed Eschar (%) 100 % 10/26/20 0800   Wound Odor None 10/26/20 0800   Right Foot Monofilament 10-point exam (Sensate) 10/10 09/22/20 1000   Exposed Structures None 10/26/20 0800       PROCEDURE:   -2% viscous lidocaine applied topically to wound bed for approximately 5 minutes prior to debridement  -Curette used to debride wound bed.  Excisional debridement was performed to remove devitalized tissue until healthy, bleeding tissue was visualized.   Entire surface of wound, 0.81 cm2 debrided.  Tissue debrided into the subcutaneous layer.   -Bleeding controlled with manual pressure.    -Wound care completed by wound RN, refer to flowsheet  -Patient tolerated the procedure well, without c/o pain or discomfort.       Pertinent Labs and Diagnostics:    Labs:     A1c:   Lab Results   Component Value Date/Time    HBA1C 8.3 (H) 09/08/2020 02:22 PM          IMAGING: MRI Foot Dated 9/8/2020    FINDINGS:     There has been previous 5th digit amputation.     There appears to be an overlying open wound.        BONE AND MARROW: There is mild bone marrow edema within the 5th metatarsal head consistent with osteomyelitis. Also, there is bone marrow edema in the 4th metatarsal head consistent with osteomyelitis.     There is 1st metatarsophalangeal joint osteoarthritis with associated subchondral edema in the distal aspect of the 1st proximal phalanx of the proximal aspect of the 1st distal phalanx     MUSCLES: The muscular compartment is normal in appearance.     LIGAMENTS and TENDONS: The visualized ligament and tendon are normal in appearance.     MISCELLANEOUS: No fluid collection or mass.       VASCULAR STUDIES: [place] Single Level    RIGHT      Waveform            Systolic BPs (mmHg)                              146           Brachial   Triphasic                                Common Femoral   Triphasic                   138           Posterior Tibial   Triphasic                  144           Dorsalis Pedis                                            Peroneal                              0.99          [place]                                            TBI                           LEFT   Waveform        Systolic BPs (mmHg)                              141           Brachial   Triphasic                                Common Femoral   Triphasic                  166           Posterior Tibial   Triphasic                  156           Dorsalis Pedis                                            Peroneal                              1.14          [place]                                            TBI    LAST  WOUND CULTURE:  DATE : 9/9/2020      Component 13d ago   Significant Indicator POSPositive (POS)    Source BONE    Site Right Fifth Toe    Culture Result -Abnormal     Culture Result Abnormal   Bacteroides uniformis   Rare growth                       ASSESSMENT AND PLAN:   1. Non-healing surgical wound, subsequent encounter    -Excisional debridement of wound today in clinic. Rolled edges taken down to stimulate granulation tissue formation prior to placement of wound VAC.  Tendon is visible to wound bed, some improvement with granulation tissue starting to form over tendon.  Wound has considerable depth this is the reason for continued use of wound VAC.  -Patient to return to clinic weekly for assessment and debridement  -Patient to return to clinic twice weekly for wound VAC dressing changes 1 time a week for assessment and debridement   Wound Care: Collagen to base of wound bed, placement of wound VAC    Patient has an eschar cap to the distal aspect of his fourth right toe (edges trimmed) was painted with Betadine in clinic today.  Area of eschar is slowly decreasing, still firm.    2. Type 2 diabetes mellitus with hyperglycemia, without long-term current use of insulin (HCC)  Patient instructed to keep tight control over FBS,  <140 for optimal wound healing; Implications of loss of protective sensation (LOPS) discussed with patient, including increased risk for amputation.  Advised to check feet at least daily, moisturize feet, and to always wear protective foot wear, arrange meticulous regular foot care by podiatrist or [facility]. Pt with good understanding.   -Patient reports that her blood sugar in clinic today was 107    3. Wound infection  Wound culture on 9/9/2020+ for bacteroides uniformis  Patient is currently on Bactrim and Augmentin with end date 10/20/2020  No signs or symptoms of infection this clinic visit 10/26/2020    PATIENT EDUCATION  - Importance of adequate nutrition for wound healing  -Advised to go to ER for any increased redness, swelling, drainage, or odor, or if patient develops fever, chills, nausea or vomiting.           Please note that this note may have been created using voice recognition software. I have worked with technical experts from [employer] to optimize the interface.  I have made every reasonable attempt to correct obvious errors, but there may be errors of grammar and possibly content that I did not discover before finalizing the note.    N

## 2020-10-28 ENCOUNTER — HOSPITAL ENCOUNTER (OUTPATIENT)
Dept: LAB | Facility: MEDICAL CENTER | Age: 55
End: 2020-10-28
Attending: NURSE PRACTITIONER
Payer: COMMERCIAL

## 2020-10-28 DIAGNOSIS — E11.628 DIABETIC FOOT INFECTION (HCC): ICD-10-CM

## 2020-10-28 DIAGNOSIS — E87.5 HYPERKALEMIA: ICD-10-CM

## 2020-10-28 DIAGNOSIS — L08.9 DIABETIC FOOT INFECTION (HCC): ICD-10-CM

## 2020-10-28 LAB
ANION GAP SERPL CALC-SCNC: 10 MMOL/L (ref 7–16)
BUN SERPL-MCNC: 28 MG/DL (ref 8–22)
CALCIUM SERPL-MCNC: 9.5 MG/DL (ref 8.5–10.5)
CHLORIDE SERPL-SCNC: 105 MMOL/L (ref 96–112)
CO2 SERPL-SCNC: 23 MMOL/L (ref 20–33)
CREAT SERPL-MCNC: 1.05 MG/DL (ref 0.5–1.4)
GLUCOSE SERPL-MCNC: 78 MG/DL (ref 65–99)
POTASSIUM SERPL-SCNC: 4.9 MMOL/L (ref 3.6–5.5)
SODIUM SERPL-SCNC: 138 MMOL/L (ref 135–145)

## 2020-10-28 PROCEDURE — 80048 BASIC METABOLIC PNL TOTAL CA: CPT

## 2020-10-28 PROCEDURE — 36415 COLL VENOUS BLD VENIPUNCTURE: CPT

## 2020-10-29 ENCOUNTER — OFFICE VISIT (OUTPATIENT)
Dept: WOUND CARE | Facility: MEDICAL CENTER | Age: 55
End: 2020-10-29
Attending: INTERNAL MEDICINE
Payer: COMMERCIAL

## 2020-10-29 DIAGNOSIS — T81.89XD NONHEALING SURGICAL WOUND, SUBSEQUENT ENCOUNTER: ICD-10-CM

## 2020-10-29 PROCEDURE — 97605 NEG PRS WND THER DME<=50SQCM: CPT

## 2020-10-29 NOTE — PROGRESS NOTES
Patient was seen by provider earlier this week, wound was debrided.  Does not need to be seen by me today.  Wound care by nursing.

## 2020-10-29 NOTE — PROCEDURES
Wound Care Procedures 10/29/2020:  Wound vac reapplied using one piece of black foam.  Negative pressure wound therapy resumed at 125mmHg continuous, no leaks noted.

## 2020-11-03 ENCOUNTER — NON-PROVIDER VISIT (OUTPATIENT)
Dept: WOUND CARE | Facility: MEDICAL CENTER | Age: 55
End: 2020-11-03
Attending: INTERNAL MEDICINE
Payer: COMMERCIAL

## 2020-11-03 PROCEDURE — 97605 NEG PRS WND THER DME<=50SQCM: CPT

## 2020-11-03 NOTE — PROCEDURES
Negative pressure wound therapy reapplied at 125mmHg continuous, no leaks noted. Most likely discharge NPWT at next visit.

## 2020-11-03 NOTE — PATIENT INSTRUCTIONS
Wound VAC at 125mmHg continuous or intermittent with black foam. Dressing will be changed every MWF at the wound clinic or with your home health nurse.  If you are having issues with your wound VAC, please consider patching leaks, changing the canister, or calling [phone] for troubleshooting. If the wound VAC has been off or un-operational for over 2 hours, call wound care center to inform them and remove all dressings including black foam and replace with normal saline damp gauze.     Should you experience any significant changes in your wound(s), such as infection (redness, swelling, localized heat, increased pain, fever > 101 F, chills) or have any questions regarding your home care instructions, please contact the wound center at [phone]. If after hours, contact your primary care physician or go to the hospital emergency room.   Keep dressing clean, dry and cover when bathing. Only change dressing if it's over saturated, soiled or falls off.

## 2020-11-05 ENCOUNTER — OFFICE VISIT (OUTPATIENT)
Dept: WOUND CARE | Facility: MEDICAL CENTER | Age: 55
End: 2020-11-05
Attending: INTERNAL MEDICINE
Payer: COMMERCIAL

## 2020-11-05 VITALS
TEMPERATURE: 98.2 F | OXYGEN SATURATION: 95 % | HEART RATE: 107 BPM | RESPIRATION RATE: 16 BRPM | DIASTOLIC BLOOD PRESSURE: 79 MMHG | SYSTOLIC BLOOD PRESSURE: 140 MMHG

## 2020-11-05 DIAGNOSIS — E11.65 TYPE 2 DIABETES MELLITUS WITH HYPERGLYCEMIA, WITHOUT LONG-TERM CURRENT USE OF INSULIN (HCC): ICD-10-CM

## 2020-11-05 DIAGNOSIS — T14.8XXA WOUND INFECTION: ICD-10-CM

## 2020-11-05 DIAGNOSIS — T81.89XD NONHEALING SURGICAL WOUND, SUBSEQUENT ENCOUNTER: Primary | ICD-10-CM

## 2020-11-05 DIAGNOSIS — L08.9 WOUND INFECTION: ICD-10-CM

## 2020-11-05 PROCEDURE — 11042 DBRDMT SUBQ TIS 1ST 20SQCM/<: CPT

## 2020-11-05 PROCEDURE — 11042 DBRDMT SUBQ TIS 1ST 20SQCM/<: CPT | Performed by: NURSE PRACTITIONER

## 2020-11-05 ASSESSMENT — ENCOUNTER SYMPTOMS
WHEEZING: 0
HEADACHES: 0
DOUBLE VISION: 0
NAUSEA: 0
SHORTNESS OF BREATH: 0
WEAKNESS: 0
PALPITATIONS: 0
BLURRED VISION: 0
WEIGHT LOSS: 1
FEVER: 0
CHILLS: 0
VOMITING: 0
DIZZINESS: 0

## 2020-11-05 NOTE — PATIENT INSTRUCTIONS
-Keep your wound dressing clean, dry, and intact.    -Change your dressing every 3 to 4 days or if it becomes soiled, soaked, or falls off.    -Should you experience any significant changes in your wound(s), such as infection (redness, swelling, localized heat, increased pain, fever > 101 F, chills) or have any questions regarding your home care instructions, please contact the wound center at [phone]. If after hours, contact your primary care physician or go to the hospital emergency room.

## 2020-11-05 NOTE — PROGRESS NOTES
Provider Encounter- Full Thickness wound    HISTORY OF PRESENT ILLNESS  Wound History:    START OF CARE IN CLINIC: 9/20/2020    REFERRING PROVIDER: Edith Kaminski D.O.      WOUND- Full Thickness Wound   LOCATION:    HISTORY: Patient was admitted to [facility] [place] on 9/8/2020 patient had a ischemic toe injury with necrosis/osteo-myelitis requiring amputation of the fifth toe he was readmitted due to worsening pain and redness of the surgical wound site despite being initiated on oral antibiotics.  MRI revealed osteomyelitis of the distal MTP joint and of the fourth and fifth digits.  He underwent surgical procedure debridement of previous surgical site by Dr. Mallory.  His wound was cultured while in the hospital and grew out MSSA, diphtheroids and strep viridans.  Prior to discharge she was placed on 6 weeks of oral antibiotics.  This included Augmentin and Bactrim DS through 10/29/2020.  He is a newly diagnosed diabetic recently started on insulin.  His A1c was approximately 10.5.  He apparently had good glucose control over the last 6 weeks and has dropped nearly 2 points in his A1c.  At this time he reports that he is no longer taking insulin because he is controlled by his diet.    Pertinent Medical History: Diabetes type 2, hypertension, osteomyelitis    TOBACCO USE: Former smoker    Patient's problem list, allergies, and current medications reviewed and updated in Epic    Interval History:  9/22/2020: Clinic visit with MALINDA Aponte. Patient states that they are feeling well today.  Patient denies fever, chills, nausea, vomiting, lightheadedness, dizziness, shortness of breath and chest pain.  Removal of sutures.  Underlining tissue nonviable, removed through debridement.       9/29/2020: Clinic visit with MALINDA Aponte. Patient states that they are feeling well today.  Patient denies fever, chills, nausea, vomiting, lightheadedness, dizziness, shortness of breath and  chest pain.  We will put in for approval for wound VAC and initiate next Tuesday and next clinic appointment if approved.      10/13/2020: Clinic visit with MALINDA Aponte. Patient states that they are feeling well today.  Patient denies fever, chills, nausea, vomiting, lightheadedness, dizziness, shortness of breath and chest pain.  Wound VAC therapy initiated to right lateral wound.    10/20/2020: Clinic visit with MALINDA Aponte. Patient states that they are feeling well today.  Patient denies fever, chills, nausea, vomiting, lightheadedness, dizziness, shortness of breath and chest pain.  Patient is started to have some granulation tissue forming over the tendon to his right foot.  Trimmed edges to eschar on toe decreasing in size slowly.    10/26/2020: Clinic visit with MALINDA Aponte. Patient states that they are feeling well today.  Patient denies fever, chills, nausea, vomiting, lightheadedness, dizziness, shortness of breath and chest pain.  Eschar to toe continues to decrease firm tissue underneath remaining eschar.  Patient has granulation tissue forming to wound bed of right dorsal foot wound.  Continue with wound VAC at this time.    11/5/2020: Clinic visit with MALINDA Aponte. Patient states that they are feeling well today.  Patient denies fever, chills, nausea, vomiting, lightheadedness, dizziness, shortness of breath and chest pain.  We will hold VAC for 1 week if patient's wound progresses we will have him send wound VAC back next clinical appointment.  Wounds are progressing nicely new epithelial tissue forming to right lateral wound.  Eschar to right fourth toe decreasing in size with beefy red granulation tissue underneath trimmed in clinic today.          REVIEW OF SYSTEMS:   Review of Systems   Constitutional: Positive for weight loss. Negative for chills and fever.        Intentional weight loss   Eyes: Negative for blurred vision and double vision.    Respiratory: Negative for shortness of breath and wheezing.    Cardiovascular: Negative for chest pain, palpitations and leg swelling.   Gastrointestinal: Negative for nausea and vomiting.   Skin: Negative for itching and rash.        Surgical wound right lateral foot  Eschar Right fourth toe   Neurological: Negative for dizziness, weakness and headaches.       PHYSICAL EXAMINATION:   /79 (BP Location: Left arm)   Pulse (!) 107   Temp 36.8 °C (98.2 °F) (Temporal)   Resp 16   SpO2 95%     Physical Exam   Constitutional: He is oriented to person, place, and time and well-developed, well-nourished, and in no distress.   HENT:   Head: Normocephalic.   Eyes: Pupils are equal, round, and reactive to light. Conjunctivae are normal.   Cardiovascular: Intact distal pulses.   Pulmonary/Chest: Effort normal and breath sounds normal. He has no wheezes.   Musculoskeletal:         General: Tenderness present.   Neurological: He is alert and oriented to person, place, and time.   Skin: Skin is warm. No rash noted. No pallor.   Surgical wound right lateral foot  Eschar Right fourth toe decreasing in size edges trimmed  Significant amount of callus to right foot         WOUND ASSESSMENT       Negative Pressure Wound Therapy 10/13/20 (Active)   NPWT Pump Mode / Pressure Setting Continuous;125 mmHg 11/05/20 0930   Dressing Type Black Foam (Regular) 11/05/20 0930   Number of Foam Pieces Used 1 11/03/20 0952   Canister Changed No 11/05/20 0930           Wound 08/01/20 Diabetic Ulcer Foot Plantar Right between 1-2 toes callus with wound (Active)       Wound 08/06/20 Incision Toe, 5th Right (Active)       Wound 09/08/20 Diabetic Ulcer Toe, 4th Right POA (Active)       Wound 09/09/20 Incision Foot Right (Active)       Wound 09/22/20 Incision Toe, 5th Right R 5th toe amputation site dehisced incision (Active)   Wound Image    11/05/20 0930   Site Assessment [place] 11/05/20 0930   Periwound Assessment Dry 11/05/20 0930   Margins  Attached edges 11/05/20 0930   Closure Secondary intention 11/05/20 0930   Drainage Amount None 11/05/20 0930   Drainage Description Serosanguineous 10/29/20 0810   Treatments Cleansed;Topical Lidocaine;Provider debridement 11/05/20 0930   Wound Cleansing Normal Saline Irrigation 11/05/20 0930   Periwound Protectant Skin Protectant Wipes to Periwound;Barrier Paste 11/05/20 0930   Dressing Cleansing/Solutions Normal Saline 11/03/20 0952   Dressing Options Hydrofiber Silver Strip;Dry Gauze;Hypafix Tape;Tubigrip 11/05/20 0930   Dressing Changed New 11/05/20 0930   Dressing Status Clean;Dry;Intact 11/05/20 0930   Dressing Change/Treatment Frequency Other (Comments) 11/03/20 0952   Non-staged Wound Description Partial thickness 11/05/20 0930   Wound Length (cm) 0.3 cm 11/05/20 0930   Wound Width (cm) 0.3 cm 11/05/20 0930   Wound Depth (cm) 0.3 cm 11/05/20 0930   Wound Surface Area (cm^2) 0.09 cm^2 11/05/20 0930   Wound Volume (cm^3) 0.03 cm^3 11/05/20 0930   Post-Procedure Length (cm) 0.4 cm 11/05/20 0930   Post-Procedure Width (cm) 0.4 cm 11/05/20 0930   Post-Procedure Depth (cm) 0.3 cm 11/05/20 0930   Post-Procedure Surface Area (cm^2) 0.16 cm^2 11/05/20 0930   Post-Procedure Volume (cm^3) 0.05 cm^3 11/05/20 0930   Wound Healing % 86 11/05/20 0930   Wound Bed Granulation (%) 100 % 11/03/20 0952   Wound Bed Slough (%) 60 % 09/22/20 1000   Wound Bed Granulation (%) - Post-Procedure 100 % 11/03/20 0952   Tunneling (cm) 0 cm 11/05/20 0930   Undermining (cm) 0 cm 11/05/20 0930   Wound Odor None 11/05/20 0930   Pulses Not palpable;Doppler;DP;PT;Other (Comments) 09/22/20 1000   Right Foot Monofilament 10-point exam (Sensate) 10/10 09/22/20 1000   Exposed Structures None 11/05/20 0930       Wound 09/22/20 Diabetic Ulcer Toe, 4th Distal;Plantar Right R distal plantar 4th toe DFU, eschar (Active)   Wound Image    11/05/20 0930   Site Assessment Dry;Brown 11/05/20 0930   Periwound Assessment Intact 11/05/20 0930   Margins Other  (Comment) 10/26/20 0800   Closure Open to air 10/26/20 0800   Drainage Amount None 11/05/20 0930   Treatments Cleansed;Provider debridement 11/05/20 0930   Wound Cleansing Normal Saline Irrigation 11/05/20 0930   Periwound Protectant Skin Protectant Wipes to Periwound;Barrier Paste 11/05/20 0930   Dressing Cleansing/Solutions 3% Betadine 10/26/20 0800   Dressing Options Hydrofera Blue Ready;Hypafix Tape;Tubigrip 11/05/20 0930   Dressing Status Open to Air 10/26/20 0800   Wound Length (cm) 0.6 cm 09/29/20 0800   Wound Width (cm) 1 cm 09/29/20 0800   Wound Depth (cm) 0 cm 09/22/20 1000   Wound Surface Area (cm^2) 0.6 cm^2 09/29/20 0800   Wound Volume (cm^3) 0 cm^3 09/22/20 1000   Post-Procedure Length (cm) 0.7 cm 11/05/20 0930   Post-Procedure Width (cm) 0.9 cm 11/05/20 0930   Post-Procedure Depth (cm) 0.1 cm 11/05/20 0930   Post-Procedure Surface Area (cm^2) 0.63 cm^2 11/05/20 0930   Post-Procedure Volume (cm^3) 0.06 cm^3 11/05/20 0930   Wound Bed Eschar (%) 100 % 10/26/20 0800   Tunneling (cm) 0 cm 11/05/20 0930   Undermining (cm) 0 cm 11/05/20 0930   Wound Odor None 11/05/20 0930   Right Foot Monofilament 10-point exam (Sensate) 10/10 09/22/20 1000   Exposed Structures None 11/05/20 0930          PROCEDURE:   -2% viscous lidocaine applied topically to wound bed for approximately 5 minutes prior to debridement  -Curette used to debride wound bed.  Excisional debridement was performed to remove devitalized tissue until healthy, bleeding tissue was visualized.   Entire surface of wound, 0.16 cm2 debrided.  Tissue debrided into the subcutaneous layer.   -Bleeding controlled with manual pressure.    -Wound care completed by wound RN, refer to flowsheet  -Patient tolerated the procedure well, without c/o pain or discomfort.       Pertinent Labs and Diagnostics:    Labs:     A1c:   Lab Results   Component Value Date/Time    HBA1C 8.3 (H) 09/08/2020 02:22 PM          IMAGING: MRI Foot Dated 9/8/2020    FINDINGS:     There  has been previous 5th digit amputation.     There appears to be an overlying open wound.        BONE AND MARROW: There is mild bone marrow edema within the 5th metatarsal head consistent with osteomyelitis. Also, there is bone marrow edema in the 4th metatarsal head consistent with osteomyelitis.     There is 1st metatarsophalangeal joint osteoarthritis with associated subchondral edema in the distal aspect of the 1st proximal phalanx of the proximal aspect of the 1st distal phalanx     MUSCLES: The muscular compartment is normal in appearance.     LIGAMENTS and TENDONS: The visualized ligament and tendon are normal in appearance.     MISCELLANEOUS: No fluid collection or mass.       VASCULAR STUDIES: US-[place] Single Level    RIGHT      Waveform            Systolic BPs (mmHg)                              146           Brachial   Triphasic                                Common Femoral   Triphasic                  138           Posterior Tibial   Triphasic                  144           Dorsalis Pedis                                            Peroneal                              0.99          [place]                                            TBI                           LEFT   Waveform        Systolic BPs (mmHg)                              141           Brachial   Triphasic                                Common Femoral   Triphasic                  166           Posterior Tibial   Triphasic                  156           Dorsalis Pedis                                            Peroneal                              1.14          [place]                                            TBI    LAST  WOUND CULTURE:  DATE : 9/9/2020      Component 13d ago   Significant Indicator POSPositive (POS)    Source BONE    Site Right Fifth Toe    Culture Result -Abnormal     Culture Result Abnormal   Bacteroides uniformis   Rare growth                       ASSESSMENT AND PLAN:   1. Non-healing surgical wound, subsequent  encounter    -Excisional debridement of wound today in clinic. Rolled edges taken down to stimulate granulation tissue formation.. Tendon is no longer visible at the base of the wound bed.  -Patient to hold wound VAC for 1 week if wound continues to progress we will have the patient send back to wound VAC to [facility].  -Patient to return to clinic weekly for assessment and debridement  -Patient to return to clinic weekly for assessment and debridement   Wound Care: Hydrofiber silver, dry gauze, Hypafix tape, Tubigrip    Patient has an eschar cap to the distal aspect of his fourth right toe (edges trimmed) was painted with Betadine in clinic today.  Area of eschar is slowly decreasing, still firm.    2. Type 2 diabetes mellitus with hyperglycemia, without long-term current use of insulin (HCC)  Patient instructed to keep tight control over FBS, <140 for optimal wound healing; Implications of loss of protective sensation (LOPS) discussed with patient, including increased risk for amputation.  Advised to check feet at least daily, moisturize feet, and to always wear protective foot wear, arrange meticulous regular foot care by podiatrist or [facility]. Pt with good understanding.   -Patient reports that her blood sugar in clinic today was 116    3. Wound infection  Wound culture on 9/9/2020+ for bacteroides uniformis  Patient is currently on Bactrim and Augmentin with end date 10/20/2020  No signs or symptoms of infection this clinic visit 11/5/2020    PATIENT EDUCATION  - Importance of adequate nutrition for wound healing  -Advised to go to ER for any increased redness, swelling, drainage, or odor, or if patient develops fever, chills, nausea or vomiting.           Please note that this note may have been created using voice recognition software. I have worked with technical experts from [employer] to optimize the interface.  I have made every reasonable attempt to correct obvious errors, but there may be errors of grammar  and possibly content that I did not discover before finalizing the note.    N

## 2020-11-10 ENCOUNTER — APPOINTMENT (OUTPATIENT)
Dept: WOUND CARE | Facility: MEDICAL CENTER | Age: 55
End: 2020-11-10
Attending: INTERNAL MEDICINE
Payer: COMMERCIAL

## 2020-11-12 ENCOUNTER — HOSPITAL ENCOUNTER (OUTPATIENT)
Dept: LAB | Facility: MEDICAL CENTER | Age: 55
End: 2020-11-12
Attending: INTERNAL MEDICINE
Payer: COMMERCIAL

## 2020-11-12 ENCOUNTER — OFFICE VISIT (OUTPATIENT)
Dept: WOUND CARE | Facility: MEDICAL CENTER | Age: 55
End: 2020-11-12
Attending: INTERNAL MEDICINE
Payer: COMMERCIAL

## 2020-11-12 VITALS
SYSTOLIC BLOOD PRESSURE: 136 MMHG | DIASTOLIC BLOOD PRESSURE: 83 MMHG | TEMPERATURE: 98.7 F | RESPIRATION RATE: 20 BRPM | OXYGEN SATURATION: 94 % | HEART RATE: 115 BPM

## 2020-11-12 DIAGNOSIS — T81.89XD NONHEALING SURGICAL WOUND, SUBSEQUENT ENCOUNTER: Primary | ICD-10-CM

## 2020-11-12 DIAGNOSIS — E11.65 TYPE 2 DIABETES MELLITUS WITH HYPERGLYCEMIA, WITHOUT LONG-TERM CURRENT USE OF INSULIN (HCC): ICD-10-CM

## 2020-11-12 DIAGNOSIS — T14.8XXA WOUND INFECTION: ICD-10-CM

## 2020-11-12 DIAGNOSIS — E11.42 DIABETIC POLYNEUROPATHY ASSOCIATED WITH TYPE 2 DIABETES MELLITUS (HCC): ICD-10-CM

## 2020-11-12 DIAGNOSIS — E11.65 TYPE 2 DIABETES MELLITUS WITH HYPERGLYCEMIA, WITH LONG-TERM CURRENT USE OF INSULIN (HCC): ICD-10-CM

## 2020-11-12 DIAGNOSIS — L08.9 WOUND INFECTION: ICD-10-CM

## 2020-11-12 DIAGNOSIS — Z79.4 TYPE 2 DIABETES MELLITUS WITH HYPERGLYCEMIA, WITH LONG-TERM CURRENT USE OF INSULIN (HCC): ICD-10-CM

## 2020-11-12 DIAGNOSIS — E55.9 VITAMIN D DEFICIENCY: ICD-10-CM

## 2020-11-12 LAB
25(OH)D3 SERPL-MCNC: 32 NG/ML (ref 30–100)
EST. AVERAGE GLUCOSE BLD GHB EST-MCNC: 123 MG/DL
HBA1C MFR BLD: 5.9 % (ref 0–5.6)

## 2020-11-12 PROCEDURE — 11042 DBRDMT SUBQ TIS 1ST 20SQCM/<: CPT | Performed by: NURSE PRACTITIONER

## 2020-11-12 PROCEDURE — 36415 COLL VENOUS BLD VENIPUNCTURE: CPT

## 2020-11-12 PROCEDURE — 82306 VITAMIN D 25 HYDROXY: CPT

## 2020-11-12 PROCEDURE — 83036 HEMOGLOBIN GLYCOSYLATED A1C: CPT

## 2020-11-12 PROCEDURE — 11042 DBRDMT SUBQ TIS 1ST 20SQCM/<: CPT

## 2020-11-12 ASSESSMENT — ENCOUNTER SYMPTOMS
WEAKNESS: 0
DIZZINESS: 0
CHILLS: 0
VOMITING: 0
FEVER: 0
HEADACHES: 0
PALPITATIONS: 0
WEIGHT LOSS: 1
SHORTNESS OF BREATH: 0
DOUBLE VISION: 0
WHEEZING: 0
BLURRED VISION: 0
NAUSEA: 0

## 2020-11-12 NOTE — PROGRESS NOTES
Provider Encounter- Full Thickness wound    HISTORY OF PRESENT ILLNESS  Wound History:    START OF CARE IN CLINIC: 9/20/2020    REFERRING PROVIDER: Edith Kaminski D.O.      WOUND- Full Thickness Wound   LOCATION:    HISTORY: Patient was admitted to [facility] on 9/8/2020 patient had a ischemic toe injury with necrosis/osteo-myelitis requiring amputation of the fifth toe he was readmitted due to worsening pain and redness of the surgical wound site despite being initiated on oral antibiotics.  MRI revealed osteomyelitis of the distal MTP joint and of the fourth and fifth digits.  He underwent surgical procedure debridement of previous surgical site by Dr. Mallory.  His wound was cultured while in the hospital and grew out MSSA, diphtheroids and strep viridans.  Prior to discharge she was placed on 6 weeks of oral antibiotics.  This included Augmentin and Bactrim DS through 10/29/2020.  He is a newly diagnosed diabetic recently started on insulin.  His A1c was approximately 10.5.  He apparently had good glucose control over the last 6 weeks and has dropped nearly 2 points in his A1c.  At this time he reports that he is no longer taking insulin because he is controlled by his diet.    Pertinent Medical History: Diabetes type 2, hypertension, osteomyelitis    TOBACCO USE: Former smoker    Patient's problem list, allergies, and current medications reviewed and updated in Epic    Interval History:  9/22/2020: Clinic visit with MALINDA Aponte. Patient states that they are feeling well today.  Patient denies fever, chills, nausea, vomiting, lightheadedness, dizziness, shortness of breath and chest pain.  Removal of sutures.  Underlining tissue nonviable, removed through debridement.       9/29/2020: Clinic visit with MALINDA Aponte. Patient states that they are feeling well today.  Patient denies fever, chills, nausea, vomiting, lightheadedness, dizziness, shortness of breath and  chest pain.  We will put in for approval for wound VAC and initiate next Tuesday and next clinic appointment if approved.      10/13/2020: Clinic visit with MALINDA Aponte. Patient states that they are feeling well today.  Patient denies fever, chills, nausea, vomiting, lightheadedness, dizziness, shortness of breath and chest pain.  Wound VAC therapy initiated to right lateral wound.    10/20/2020: Clinic visit with MALINDA Aponte. Patient states that they are feeling well today.  Patient denies fever, chills, nausea, vomiting, lightheadedness, dizziness, shortness of breath and chest pain.  Patient is started to have some granulation tissue forming over the tendon to his right foot.  Trimmed edges to eschar on toe decreasing in size slowly.    10/26/2020: Clinic visit with MALINDA Aponte. Patient states that they are feeling well today.  Patient denies fever, chills, nausea, vomiting, lightheadedness, dizziness, shortness of breath and chest pain.  Eschar to toe continues to decrease firm tissue underneath remaining eschar.  Patient has granulation tissue forming to wound bed of right dorsal foot wound.  Continue with wound VAC at this time.    11/5/2020: Clinic visit with MALINDA Aponte. Patient states that they are feeling well today.  Patient denies fever, chills, nausea, vomiting, lightheadedness, dizziness, shortness of breath and chest pain.  We will hold VAC for 1 week if patient's wound progresses we will have him send wound VAC back next clinical appointment.  Wounds are progressing nicely new epithelial tissue forming to right lateral wound.  Eschar to right fourth toe decreasing in size with beefy red granulation tissue underneath trimmed in clinic today.    11/12/2020 : Clinic visit with MALINDA [place]arillo.  Mc states he is feeling well, denies fevers, chills, nausea, vomiting, cough or shortness of breath.  He reports his blood sugar today was 120.  He has not  noticed much drainage from his wounds.  He continues to use knee scooter to offload his foot.  He has not yet started process for orthotic shoes and inserts.      REVIEW OF SYSTEMS:   Review of Systems   Constitutional: Positive for weight loss. Negative for chills and fever.        Intentional weight loss   Eyes: Negative for blurred vision and double vision.   Respiratory: Negative for shortness of breath and wheezing.    Cardiovascular: Negative for chest pain, palpitations and leg swelling.   Gastrointestinal: Negative for nausea and vomiting.   Skin: Negative for itching and rash.        Surgical wound right lateral foot  Eschar Right fourth toe   Neurological: Negative for dizziness, weakness and headaches.        Numbness in both feet       PHYSICAL EXAMINATION:   /83   Pulse (!) 115   Temp 37.1 °C (98.7 °F)   Resp 20   SpO2 94%     Physical Exam   Constitutional: He is oriented to person, place, and time and well-developed, well-nourished, and in no distress.   HENT:   Head: Normocephalic.   Eyes: Pupils are equal, round, and reactive to light. Conjunctivae are normal.   Cardiovascular: Intact distal pulses.   Pulmonary/Chest: Effort normal and breath sounds normal. He has no wheezes.   Musculoskeletal:         General: Tenderness present.   Neurological: He is alert and oriented to person, place, and time.   Feet insensate to light touch   Skin: Skin is warm. No rash noted. No pallor.   Surgical wound right lateral foot  Eschar Right fourth toe decreasing in size edges trimmed  Significant amount of callus to right foot         WOUND ASSESSMENT          Wound 09/22/20 Incision Toe, 5th Right R 5th toe amputation site dehisced incision (Active)   Wound Image    11/12/20 0915   Site Assessment Yellow 11/12/20 0915   Periwound Assessment Dry 11/12/20 0915   Margins Attached edges 11/12/20 0915   Closure Secondary intention 11/12/20 0915   Drainage Amount None 11/12/20 0915   Drainage Description  Serosanguineous 10/29/20 0810   Treatments Cleansed;Topical Lidocaine;Provider debridement 11/12/20 0915   Wound Cleansing Normal Saline Irrigation 11/12/20 0915   Periwound Protectant Skin Protectant Wipes to Periwound;Barrier Paste 11/12/20 0915   Dressing Cleansing/Solutions Normal Saline 11/03/20 0952   Dressing Options Hydrofiber Silver Strip;Dry Gauze;Hypafix Tape;Tubigrip 11/12/20 0915   Dressing Changed Changed 11/12/20 0915   Dressing Status Clean;Dry;Intact 11/12/20 0915   Dressing Change/Treatment Frequency Other (Comments) 11/03/20 0952   Non-staged Wound Description Partial thickness 11/12/20 0915   Wound Length (cm) 0.3 cm 11/05/20 0930   Wound Width (cm) 0.3 cm 11/05/20 0930   Wound Depth (cm) 0.3 cm 11/05/20 0930   Wound Surface Area (cm^2) 0.09 cm^2 11/05/20 0930   Wound Volume (cm^3) 0.03 cm^3 11/05/20 0930   Post-Procedure Length (cm) 0.2 cm 11/12/20 0915   Post-Procedure Width (cm) 0.2 cm 11/12/20 0915   Post-Procedure Depth (cm) 0.2 cm 11/12/20 0915   Post-Procedure Surface Area (cm^2) 0.04 cm^2 11/12/20 0915   Post-Procedure Volume (cm^3) 0.01 cm^3 11/12/20 0915   Wound Healing % 86 11/05/20 0930   Wound Bed Granulation (%) 100 % 11/03/20 0952   Wound Bed Slough (%) 60 % 09/22/20 1000   Wound Bed Granulation (%) - Post-Procedure 100 % 11/03/20 0952   Tunneling (cm) 0 cm 11/12/20 0915   Undermining (cm) 0 cm 11/12/20 0915   Wound Odor None 11/12/20 0915   Pulses Not palpable;Doppler;DP;PT;Other (Comments) 09/22/20 1000   Right Foot Monofilament 10-point exam (Sensate) 10/10 09/22/20 1000   Exposed Structures None 11/12/20 0915       Wound 09/22/20 Diabetic Ulcer Toe, 4th Distal;Plantar Right R distal plantar 4th toe DFU, eschar (Active)   Wound Image    11/12/20 0915   Site Assessment [place] 11/12/20 0915   Periwound Assessment Intact;Callused 11/12/20 0915   Margins Other (Comment) 10/26/20 0800   Closure Open to air 10/26/20 0800   Drainage Amount None 11/12/20 0915   Treatments Cleansed;Topical  Lidocaine;Provider debridement 11/12/20 0915   Wound Cleansing Normal Saline Irrigation 11/12/20 0915   Periwound Protectant Skin Protectant Wipes to Periwound;Barrier Paste 11/12/20 0915   Dressing Cleansing/Solutions 3% Betadine 10/26/20 0800   Dressing Options Hydrofera Blue Ready;Hypafix Tape;Tubigrip 11/12/20 0915   Dressing Status Open to Air 10/26/20 0800   Wound Length (cm) 0.2 cm 11/12/20 0915   Wound Width (cm) 0.2 cm 11/12/20 0915   Wound Depth (cm) 0 cm 11/12/20 0915   Wound Surface Area (cm^2) 0.04 cm^2 11/12/20 0915   Wound Volume (cm^3) 0 cm^3 11/12/20 0915   Post-Procedure Length (cm) 0.3 cm 11/12/20 0915   Post-Procedure Width (cm) 0.2 cm 11/12/20 0915   Post-Procedure Depth (cm) 0 cm 11/12/20 0915   Post-Procedure Surface Area (cm^2) 0.06 cm^2 11/12/20 0915   Post-Procedure Volume (cm^3) 0 cm^3 11/12/20 0915   Wound Bed Eschar (%) 100 % 10/26/20 0800   Tunneling (cm) 0 cm 11/12/20 0915   Undermining (cm) 0 cm 11/12/20 0915   Wound Odor None 11/12/20 0915   Right Foot Monofilament 10-point exam (Sensate) 10/10 09/22/20 1000   Exposed Structures None 11/12/20 0915            PROCEDURE: Excisional debridement of right dorsal foot wound, callus debridement from right distal fourth toe  -2% viscous lidocaine applied topically to wound bed for approximately 5 minutes prior to debridement  -Curette used to debride wound bed.  Excisional debridement was performed to remove devitalized tissue until healthy, bleeding tissue was visualized.   Entire surface of wound, 0.04 cm2 debrided.  Tissue debrided into the subcutaneous layer.     -Bleeding controlled with manual pressure.    -Wound care completed by wound RN, refer to flowsheet  -Patient tolerated the procedure well, without c/o pain or discomfort.       Pertinent Labs and Diagnostics:    Labs:     A1c:   Lab Results   Component Value Date/Time    HBA1C 8.3 (H) 09/08/2020 02:22 PM          IMAGING: MRI Foot Dated 9/8/2020    FINDINGS:     There has been  previous 5th digit amputation.     There appears to be an overlying open wound.        BONE AND MARROW: There is mild bone marrow edema within the 5th metatarsal head consistent with osteomyelitis. Also, there is bone marrow edema in the 4th metatarsal head consistent with osteomyelitis.     There is 1st metatarsophalangeal joint osteoarthritis with associated subchondral edema in the distal aspect of the 1st proximal phalanx of the proximal aspect of the 1st distal phalanx     MUSCLES: The muscular compartment is normal in appearance.     LIGAMENTS and TENDONS: The visualized ligament and tendon are normal in appearance.     MISCELLANEOUS: No fluid collection or mass.       VASCULAR STUDIES: US-[place] Single Level    RIGHT      Waveform            Systolic BPs (mmHg)                              146           Brachial   Triphasic                                Common Femoral   Triphasic                  138           Posterior Tibial   Triphasic                  144           Dorsalis Pedis                                            Peroneal                              0.99          [place]                                            TBI                           LEFT   Waveform        Systolic BPs (mmHg)                              141           Brachial   Triphasic                                Common Femoral   Triphasic                  166           Posterior Tibial   Triphasic                  156           Dorsalis Pedis                                            Peroneal                              1.14          [place]                                            TBI    LAST  WOUND CULTURE:  DATE : 9/9/2020      Component 13d ago   Significant Indicator POSPositive (POS)    Source BONE    Site Right Fifth Toe    Culture Result -Abnormal     Culture Result Abnormal   Bacteroides uniformis   Rare growth                       ASSESSMENT AND PLAN:   1. Non-healing surgical wound, subsequent encounter    11/12/2020:  Dorsal foot wound nearly resolved, pinhole wound remains.  Callus to distal fourth toe persists.  He continues to use knee scooter for offloading.  -Excisional debridement of wound today in clinic. Rolled edges taken down to stimulate granulation tissue formation..   -VAC discontinued.  [facility] instructed [facility].  -Patient to return to clinic weekly for assessment and debridement  -Callus debrided from distal fourth toe  -Rx for orthotic shoes and inserts provided.  Patient advised to begin process ASAP.  Also informed patient that he needs to get new shoes yearly.   Wound Care: Hydrofiber silver, dry gauze, Hypafix tape, Tubigrip    .    2. Type 2 diabetes mellitus with hyperglycemia, without long-term current use of insulin (HCC)  Patient instructed to keep tight control over FBS, <140 for optimal wound healing; Implications of loss of protective sensation (LOPS) discussed with patient, including increased risk for amputation.  Advised to check feet at least daily, moisturize feet, and to always wear protective foot wear, arrange meticulous regular foot care by podiatrist or [facility]. Pt with good understanding.   -Patient reports that her blood sugar in clinic today was 120    3. Wound infection    11/12/2020: No signs or symptoms infection  -Monitor at each clinic visit    4.  Diabetic polyneuropathy associated with type 2 diabetes mellitus  Comments: Numbness in both feet    11/12/2020:  - Implications of loss of protective sensation (LOPS) discussed with patient- including increased risk for amputation.  Advised to check feet at least daily, moisturize feet, and to always wear protective foot wear.       PATIENT EDUCATION  - Importance of adequate nutrition for wound healing  -Advised to go to ER for any increased redness, swelling, drainage, or odor, or if patient develops fever, chills, nausea or vomiting.           Please note that this note may have been created using voice recognition software. I  have worked with technical experts from [facility] to optimize the interface.  I have made every reasonable attempt to correct obvious errors, but there may be errors of grammar and possibly content that I did not discover before finalizing the note.    N

## 2020-11-16 ENCOUNTER — OFFICE VISIT (OUTPATIENT)
Dept: MEDICAL GROUP | Facility: LAB | Age: 55
End: 2020-11-16
Payer: COMMERCIAL

## 2020-11-16 VITALS
HEIGHT: 68 IN | DIASTOLIC BLOOD PRESSURE: 78 MMHG | HEART RATE: 100 BPM | SYSTOLIC BLOOD PRESSURE: 140 MMHG | RESPIRATION RATE: 12 BRPM | TEMPERATURE: 97.3 F | OXYGEN SATURATION: 99 % | WEIGHT: 201.6 LBS | BODY MASS INDEX: 30.55 KG/M2

## 2020-11-16 DIAGNOSIS — E55.9 VITAMIN D DEFICIENCY: ICD-10-CM

## 2020-11-16 DIAGNOSIS — E11.65 TYPE 2 DIABETES MELLITUS WITH HYPERGLYCEMIA, WITH LONG-TERM CURRENT USE OF INSULIN (HCC): ICD-10-CM

## 2020-11-16 DIAGNOSIS — Z12.11 SCREEN FOR COLON CANCER: ICD-10-CM

## 2020-11-16 DIAGNOSIS — Z79.4 TYPE 2 DIABETES MELLITUS WITH HYPERGLYCEMIA, WITH LONG-TERM CURRENT USE OF INSULIN (HCC): ICD-10-CM

## 2020-11-16 PROCEDURE — 99214 OFFICE O/P EST MOD 30 MIN: CPT | Performed by: INTERNAL MEDICINE

## 2020-11-16 ASSESSMENT — FIBROSIS 4 INDEX: FIB4 SCORE: 0.98

## 2020-11-17 ENCOUNTER — APPOINTMENT (OUTPATIENT)
Dept: NEPHROLOGY | Facility: MEDICAL CENTER | Age: 55
End: 2020-11-17
Payer: COMMERCIAL

## 2020-11-17 NOTE — PROGRESS NOTES
CC: Mc Eugene is a 55 y.o. male is suffering from   Chief Complaint   Patient presents with   • Diabetes     1 month follow up         SUBJECTIVE:  1. Vitamin D deficiency  Cristopher is here for follow-up has a history of vitamin D deficiency we will recheck levels as necessary    2. Screen for colon cancer  Screening for colon cancer ordered    3. Type 2 diabetes mellitus with hyperglycemia, with long-term current use of insulin (HCC)  Well-controlled type 2 diabetes        Past social, family, history:   Social History     Tobacco Use   • Smoking status: Former Smoker   • Smokeless tobacco: Never Used   Substance Use Topics   • Alcohol use: Not Currently   • Drug use: Yes     Comment: marijuana vape         MEDICATIONS:    Current Outpatient Medications:   •  NON SPECIFIED, You are low on Vitamin D please start 2,000 IU over the counter each day, Nature Made is a good brand!, Disp: 1 Each, Rfl: 0  •  vitamin D, Ergocalciferol, (DRISDOL) 1.25 MG (19855 UT) Cap capsule, Take 1 Cap by mouth every 7 days., Disp: 5 Cap, Rfl: 1  •  ketoconazole (NIZORAL) 2 % Cream, Apply 0.5 g to affected area(s) every day., Disp: 30 g, Rfl: 1  •  glucose blood (ACCU-CHEK GUIDE) strip, 1 Strip by Other route 3 times a day. Use one strip each time to test blood sugar three times a day (Patient not taking: Reported on 10/20/2020), Disp: 100 Strip, Rfl: 1  •  Insulin Lispro (HUMALOG KWIKPEN) 200 UNIT/ML Solution Pen-injector, Inject 1-6 Units as instructed 2 times a day, with meals. Sliding Scale 151- 200 = 1 unit 201- 250 = 2 units 251-300 = 3 units 301-350 = 4 units 351-400 = 5 units Over 400 = 6 units, Disp: , Rfl:     PROBLEMS:  Patient Active Problem List    Diagnosis Date Noted   • Diabetic foot infection (HCC) 08/01/2020     Priority: High   • Leukocytosis 09/08/2020     Priority: Medium   • Essential hypertension 08/03/2020     Priority: Medium   • Type 2 diabetes mellitus with hyperglycemia, with long-term current use  "of insulin ([facility]) 08/01/2020     Priority: Medium   • Class 1 obesity due to excess calories without serious comorbidity with body mass index (BMI) of 33.0 to 33.9 in adult 08/01/2020     Priority: Low       REVIEW OF SYSTEMS:  Gen.:  No Nausea, Vomiting, fever, Chills.  Heart: No chest pain.  Lungs:  No shortness of Breath.  Psychological: Vincenzo unusual Anxiety depression     PHYSICAL EXAM   Constitutional: Alert, cooperative, not in acute distress.  Cardiovascular:  Rate Rhythm is regular without murmurs rubs clicks.     Thorax & Lungs: Clear to auscultation, no wheezing, rhonchi, or rales  HENT: Normocephalic, Atraumatic.  Eyes: PERRLA, EOMI, Conjunctiva normal.   Neck: Trachia is midline no swelling of the thyroid.   Lymphatic: No lymphadenopathy noted.   Musculoskeletal: Previously amputated right little toe appears to be healing well.  Neurologic: Alert & oriented x 3, cranial nerves II through XII are intact, Normal motor function, Normal sensory function, No focal deficits noted.   Psychiatric: Affect normal, Judgment normal, Mood normal.     VITAL SIGNS:/78   Pulse 100   Temp 36.3 °C (97.3 °F) (Temporal)   Resp 12   Ht 1.727 m (5' 8\")   Wt 91.4 kg (201 lb 9.6 oz)   SpO2 99%   BMI 30.65 kg/m²     Labs: Reviewed    Assessment:                                                     Plan:    1. Vitamin D deficiency  Stable recheck vitamin D as necessary  - NON SPECIFIED; You are low on Vitamin D please start 2,000 IU over the counter each day, Nature Made is a good brand!  Dispense: 1 Each; Refill: 0  - REFERRAL TO GI FOR COLONOSCOPY    2. Screen for colon cancer  Referral written  - REFERRAL TO GI FOR COLONOSCOPY    3. Type 2 diabetes mellitus with hyperglycemia, with long-term current use of insulin ([facility])  Clinically stable and improved  - REFERRAL TO GI FOR COLONOSCOPY        "

## 2020-11-19 ENCOUNTER — TELEMEDICINE (OUTPATIENT)
Dept: NEPHROLOGY | Facility: MEDICAL CENTER | Age: 55
End: 2020-11-19
Payer: COMMERCIAL

## 2020-11-19 VITALS — SYSTOLIC BLOOD PRESSURE: 120 MMHG | DIASTOLIC BLOOD PRESSURE: 78 MMHG

## 2020-11-19 DIAGNOSIS — Z79.4 TYPE 2 DIABETES MELLITUS WITH HYPERGLYCEMIA, WITH LONG-TERM CURRENT USE OF INSULIN (HCC): ICD-10-CM

## 2020-11-19 DIAGNOSIS — N17.9 AKI (ACUTE KIDNEY INJURY) (HCC): ICD-10-CM

## 2020-11-19 DIAGNOSIS — N18.9 CHRONIC KIDNEY DISEASE, UNSPECIFIED CKD STAGE: ICD-10-CM

## 2020-11-19 DIAGNOSIS — E11.65 TYPE 2 DIABETES MELLITUS WITH HYPERGLYCEMIA, WITH LONG-TERM CURRENT USE OF INSULIN (HCC): ICD-10-CM

## 2020-11-19 DIAGNOSIS — R80.9 PROTEINURIA, UNSPECIFIED TYPE: ICD-10-CM

## 2020-11-19 PROCEDURE — 99204 OFFICE O/P NEW MOD 45 MIN: CPT | Mod: 95,CR | Performed by: INTERNAL MEDICINE

## 2020-11-19 ASSESSMENT — ENCOUNTER SYMPTOMS
VOMITING: 0
SHORTNESS OF BREATH: 0
COUGH: 0
NAUSEA: 0
CHILLS: 0
FEVER: 0

## 2020-11-19 NOTE — PROGRESS NOTES
Telemedicine: New Patient   This evaluation was conducted via Zoom using secure and encrypted videoconferencing technology. The patient was in a private location in the state of [place].    The patient's identity was confirmed and verbal consent was obtained for this virtual visit.    Subjective:     CC:   Chief Complaint   Patient presents with   • Chronic Kidney Disease       Mc Eugene is a 55 y.o. male presenting to establish care and to discuss the evaluation and management of:  Patient is a pleasant 55-year-old gentleman with past medical history significant for longstanding diabetes, history of diabetic retinopathy, recent hospitalization with diabetic foot ulcer/infection, developed acute kidney injury, his creatinine is improving  Patient has no hematuria no dysuria, no recent use of NSAIDs or IV contrast exposure  Patient was evaluated by a nephrologist about 10 years ago and was informed about the questionable proteinuria    Review of Systems   Constitutional: Negative for chills, fever and malaise/fatigue.   Respiratory: Negative for cough and shortness of breath.    Cardiovascular: Negative for chest pain and leg swelling.   Gastrointestinal: Negative for nausea and vomiting.   Genitourinary: Negative for dysuria, frequency and urgency.   All other systems reviewed and are negative.        Allergies   Allergen Reactions   • Bactrim Ds      Rash and high potassium       Current medicines (including changes today)  Current Outpatient Medications   Medication Sig Dispense Refill   • NON SPECIFIED You are low on Vitamin D please start 2,000 IU over the counter each day, Nature Made is a good brand! 1 Each 0   • vitamin D, Ergocalciferol, (DRISDOL) 1.25 MG (96927 UT) Cap capsule Take 1 Cap by mouth every 7 days. 5 Cap 1   • glucose blood (ACCU-CHEK GUIDE) strip 1 Strip by Other route 3 times a day. Use one strip each time to test blood sugar three times a day (Patient not taking: Reported on  10/20/2020) 100 Strip 1   • ketoconazole (NIZORAL) 2 % Cream Apply 0.5 g to affected area(s) every day. 30 g 1   • Insulin Lispro (HUMALOG KWIKPEN) 200 UNIT/ML Solution Pen-injector Inject 1-6 Units as instructed 2 times a day, with meals. Sliding Scale 151- 200 = 1 unit  201- 250 = 2 units  251-300 = 3 units  301-350 = 4 units  351-400 = 5 units  Over 400 = 6 units       No current facility-administered medications for this visit.        He  has a past medical history of Diabetes (HCC) and Kidney anomaly, congenital.  He  has a past surgical history that includes appendectomy; toe amputation (Right, 2020); toe amputation (Right, 2020); and incision and drainage general (Right, 2020).      Family History   Problem Relation Age of Onset   • Lung Disease Mother    • Cancer Mother      Family Status   Relation Name Status   • Mo     • Fa         Patient Active Problem List    Diagnosis Date Noted   • Diabetic foot infection (HCC) 2020     Priority: High   • Leukocytosis 2020     Priority: Medium   • Essential hypertension 2020     Priority: Medium   • Type 2 diabetes mellitus with hyperglycemia, with long-term current use of insulin (HCC) 2020     Priority: Medium   • Class 1 obesity due to excess calories without serious comorbidity with body mass index (BMI) of 33.0 to 33.9 in adult 2020     Priority: Low          Objective:   /78     Physical Exam  Vitals obtained by patient:    Physical Exam:  Constitutional: Alert, no distress, well-groomed.  Skin: No rashes in visible areas.  Eye: Round. Conjunctiva clear, lids normal. No icterus.   ENMT: Lips pink without lesions, good dentition, moist mucous membranes. Phonation normal.  Neck: No masses, no thyromegaly. Moves freely without pain.  CV: Pulse as reported by patient  Respiratory: Unlabored respiratory effort, no cough or audible wheeze  Psych: Alert and oriented x3, normal affect and mood.        Assessment and Plan:   The following treatment plan was discussed:     1. [place] (acute kidney injury) (HCC)  Secondary to ATN  Creatinine is improving  No uremic symptoms  No acute need for dialysis    2. Chronic kidney disease, unspecified CKD stage  Questionable early stage of diabetic nephropathy  No uremic symptoms  Renal dose of medication  Avoid nephrotoxins  Continue same medication regimen  Recheck labs in 6 months    3. Proteinuria, unspecified type  Recheck in 6-month  If proteinuria persistent we will start ACE inhibitor if blood pressure tolerates  4. Type 2 diabetes mellitus with hyperglycemia, with long-term current use of insulin (HCC)        Follow-up: Return in about 6 months (around 5/19/2021).

## 2020-11-20 ENCOUNTER — OFFICE VISIT (OUTPATIENT)
Dept: WOUND CARE | Facility: MEDICAL CENTER | Age: 55
End: 2020-11-20
Attending: INTERNAL MEDICINE
Payer: COMMERCIAL

## 2020-11-20 VITALS
SYSTOLIC BLOOD PRESSURE: 125 MMHG | DIASTOLIC BLOOD PRESSURE: 80 MMHG | TEMPERATURE: 97.5 F | OXYGEN SATURATION: 99 % | HEART RATE: 97 BPM | RESPIRATION RATE: 18 BRPM

## 2020-11-20 DIAGNOSIS — E11.42 DIABETIC POLYNEUROPATHY ASSOCIATED WITH TYPE 2 DIABETES MELLITUS (HCC): ICD-10-CM

## 2020-11-20 DIAGNOSIS — T14.8XXA WOUND INFECTION: ICD-10-CM

## 2020-11-20 DIAGNOSIS — T81.89XD NONHEALING SURGICAL WOUND, SUBSEQUENT ENCOUNTER: ICD-10-CM

## 2020-11-20 DIAGNOSIS — E11.65 TYPE 2 DIABETES MELLITUS WITH HYPERGLYCEMIA, WITHOUT LONG-TERM CURRENT USE OF INSULIN (HCC): ICD-10-CM

## 2020-11-20 DIAGNOSIS — L08.9 WOUND INFECTION: ICD-10-CM

## 2020-11-20 PROCEDURE — 99212 OFFICE O/P EST SF 10 MIN: CPT

## 2020-11-20 PROCEDURE — 99213 OFFICE O/P EST LOW 20 MIN: CPT | Performed by: NURSE PRACTITIONER

## 2020-11-20 ASSESSMENT — ENCOUNTER SYMPTOMS
FEVER: 0
WHEEZING: 0
WEAKNESS: 0
NAUSEA: 0
SHORTNESS OF BREATH: 0
PALPITATIONS: 0
BLURRED VISION: 0
WEIGHT LOSS: 1
HEADACHES: 0
CHILLS: 0
VOMITING: 0
DOUBLE VISION: 0
DIZZINESS: 0

## 2020-11-20 ASSESSMENT — PAIN SCALES - GENERAL: PAINLEVEL: NO PAIN

## 2020-11-20 NOTE — PROGRESS NOTES
Provider Encounter- Full Thickness wound    HISTORY OF PRESENT ILLNESS  Wound History:    START OF CARE IN CLINIC: 9/20/2020    REFERRING PROVIDER: Edith Kaminski D.O.      WOUND- Full Thickness Wound   LOCATION:    HISTORY: Patient was admitted to [facility] on 9/8/2020 patient had a ischemic toe injury with necrosis/osteo-myelitis requiring amputation of the fifth toe he was readmitted due to worsening pain and redness of the surgical wound site despite being initiated on oral antibiotics.  MRI revealed osteomyelitis of the distal MTP joint and of the fourth and fifth digits.  He underwent surgical procedure debridement of previous surgical site by Dr. Mallory.  His wound was cultured while in the hospital and grew out MSSA, diphtheroids and strep viridans.  Prior to discharge she was placed on 6 weeks of oral antibiotics.  This included Augmentin and Bactrim DS through 10/29/2020.  He is a newly diagnosed diabetic recently started on insulin.  His A1c was approximately 10.5.  He apparently had good glucose control over the last 6 weeks and has dropped nearly 2 points in his A1c.  At this time he reports that he is no longer taking insulin because he is controlled by his diet.    Pertinent Medical History: Diabetes type 2, hypertension, osteomyelitis    TOBACCO USE: Former smoker    Patient's problem list, allergies, and current medications reviewed and updated in Epic    Interval History:  9/22/2020: Clinic visit with MALINDA Aponte. Patient states that they are feeling well today.  Patient denies fever, chills, nausea, vomiting, lightheadedness, dizziness, shortness of breath and chest pain.  Removal of sutures.  Underlining tissue nonviable, removed through debridement.       9/29/2020: Clinic visit with MALINDA Aponte. Patient states that they are feeling well today.  Patient denies fever, chills, nausea, vomiting, lightheadedness, dizziness, shortness of breath and  chest pain.  We will put in for approval for wound VAC and initiate next Tuesday and next clinic appointment if approved.      10/13/2020: Clinic visit with MALINDA Aponte. Patient states that they are feeling well today.  Patient denies fever, chills, nausea, vomiting, lightheadedness, dizziness, shortness of breath and chest pain.  Wound VAC therapy initiated to right lateral wound.    10/20/2020: Clinic visit with MALINDA Aponte. Patient states that they are feeling well today.  Patient denies fever, chills, nausea, vomiting, lightheadedness, dizziness, shortness of breath and chest pain.  Patient is started to have some granulation tissue forming over the tendon to his right foot.  Trimmed edges to eschar on toe decreasing in size slowly.    10/26/2020: Clinic visit with MALINDA Aponte. Patient states that they are feeling well today.  Patient denies fever, chills, nausea, vomiting, lightheadedness, dizziness, shortness of breath and chest pain.  Eschar to toe continues to decrease firm tissue underneath remaining eschar.  Patient has granulation tissue forming to wound bed of right dorsal foot wound.  Continue with wound VAC at this time.    11/5/2020: Clinic visit with MALINDA Aponte. Patient states that they are feeling well today.  Patient denies fever, chills, nausea, vomiting, lightheadedness, dizziness, shortness of breath and chest pain.  We will hold VAC for 1 week if patient's wound progresses we will have him send wound VAC back next clinical appointment.  Wounds are progressing nicely new epithelial tissue forming to right lateral wound.  Eschar to right fourth toe decreasing in size with beefy red granulation tissue underneath trimmed in clinic today.    11/12/2020 : Clinic visit with MALINDA Camarillo.  Mc states he is feeling well, denies fevers, chills, nausea, vomiting, cough or shortness of breath.  He reports his blood sugar today was 120.  He has not  noticed much drainage from his wounds.  He continues to use knee scooter to offload his foot.  He has not yet started process for orthotic shoes and inserts.    11/20/2020 : Clinic visit with MALINDA Camarillo.  Mc states he is feeling well, denies fevers, chills, nausea, vomiting, cough or shortness of breath.  His wounds are resolved today, and his discharge from [facility].  He has not yet started process for orthotic shoes and inserts.  Encouraged him to do so ASAP.  Advised him to continue to wear offloading shoe and use knee scooter if he needs to walk long distances.  He reports his blood sugar today was 126, A1c recently checked, 5.9.      REVIEW OF SYSTEMS:   Review of Systems   Constitutional: Positive for weight loss. Negative for chills and fever.        Intentional weight loss   Eyes: Negative for blurred vision and double vision.   Respiratory: Negative for shortness of breath and wheezing.    Cardiovascular: Negative for chest pain, palpitations and leg swelling.   Gastrointestinal: Negative for nausea and vomiting.   Skin: Negative for itching and rash.        Surgical wound right lateral foot  Eschar Right fourth toe   Neurological: Negative for dizziness, weakness and headaches.        Numbness in both feet       PHYSICAL EXAMINATION:   /80   Pulse 97   Temp 36.4 °C (97.5 °F)   Resp 18   SpO2 99%     Physical Exam   Constitutional: He is oriented to person, place, and time and well-developed, well-nourished, and in no distress.   HENT:   Head: Normocephalic.   Eyes: Pupils are equal, round, and reactive to light. Conjunctivae are normal.   Cardiovascular: Intact distal pulses.   Pulmonary/Chest: Effort normal and breath sounds normal. He has no wheezes.   Musculoskeletal:         General: Tenderness present.   Neurological: He is alert and oriented to person, place, and time.   Feet insensate to light touch   Skin: Skin is warm. No rash noted. No pallor.   Surgical wound right lateral  foot  Eschar Right fourth toe decreasing in size edges trimmed  Significant amount of callus to right foot         WOUND ASSESSMENT                Wounds resolved, left open to air  Moisturizer applied to foot      Pertinent Labs and Diagnostics:    Labs:     A1c:   Lab Results   Component Value Date/Time    HBA1C 5.9 (H) 11/12/2020 07:33 AM          IMAGING: MRI Foot Dated 9/8/2020    FINDINGS:     There has been previous 5th digit amputation.     There appears to be an overlying open wound.        BONE AND MARROW: There is mild bone marrow edema within the 5th metatarsal head consistent with osteomyelitis. Also, there is bone marrow edema in the 4th metatarsal head consistent with osteomyelitis.     There is 1st metatarsophalangeal joint osteoarthritis with associated subchondral edema in the distal aspect of the 1st proximal phalanx of the proximal aspect of the 1st distal phalanx     MUSCLES: The muscular compartment is normal in appearance.     LIGAMENTS and TENDONS: The visualized ligament and tendon are normal in appearance.     MISCELLANEOUS: No fluid collection or mass.       VASCULAR STUDIES: US-[place] Single Level    RIGHT      Waveform            Systolic BPs (mmHg)                              146           Brachial   Triphasic                                Common Femoral   Triphasic                  138           Posterior Tibial   Triphasic                  144           Dorsalis Pedis                                            Peroneal                              0.99          [place]                                            TBI                           LEFT   Waveform        Systolic BPs (mmHg)                              141           Brachial   Triphasic                                Common Femoral   Triphasic                  166           Posterior Tibial   Triphasic                  156           Dorsalis Pedis                                            Peroneal                               1.14          [place]                                            TBI    LAST  WOUND CULTURE:  DATE : 9/9/2020      Component 13d ago   Significant Indicator POSPositive (POS)    Source BONE    Site Right Fifth Toe    Culture Result -Abnormal     Culture Result Abnormal   Bacteroides uniformis   Rare growth                       ASSESSMENT AND PLAN:   1. Non-healing surgical wound, subsequent encounter    11/20/2020: Wounds resolved  -Discharge from [facility]  -Patient to return to clinic ASAP if wound recurs, or if he develops new wounds  -Patient to begin process for orthotic shoes and inserts ASAP  -Continue to wear offloading shoe, and use knee scooter when walking long distances until orthotics are ready    .    2. Type 2 diabetes mellitus with hyperglycemia, without long-term current use of insulin (HCC)  Patient instructed to keep tight control over FBS, <140 for optimal wound healing; Implications of loss of protective sensation (LOPS) discussed with patient, including increased risk for amputation.  Advised to check feet at least daily, moisturize feet, and to always wear protective foot wear, arrange meticulous regular foot care by podiatrist or [facility]. Pt with good understanding.   -Patient reports that his blood sugar in clinic today was 126    3. Wound infection    11/20/2020: No signs or symptoms infection      4.  Diabetic polyneuropathy associated with type 2 diabetes mellitus  Comments: Numbness in both feet    11/20/2020:  -Reviewed diabetic foot care.  Advised to check feet at least daily, moisturize feet, and to always wear protective foot wear.       PATIENT EDUCATION  - Importance of adequate nutrition for wound healing  -Advised to go to ER for any increased redness, swelling, drainage, or odor, or if patient develops fever, chills, nausea or vomiting.       Patient was seen for 15 minutes face to face of which > 50% of appointment time was spent on counseling and coordination of care regarding the  above.    Please note that this note may have been created using voice recognition software. I have worked with technical experts from [facility] to optimize the interface.  I have made every reasonable attempt to correct obvious errors, but there may be errors of grammar and possibly content that I did not discover before finalizing the note.    N

## 2020-12-03 ENCOUNTER — APPOINTMENT (OUTPATIENT)
Dept: WOUND CARE | Facility: MEDICAL CENTER | Age: 55
End: 2020-12-03
Attending: INTERNAL MEDICINE
Payer: COMMERCIAL

## 2020-12-10 ENCOUNTER — APPOINTMENT (OUTPATIENT)
Dept: WOUND CARE | Facility: MEDICAL CENTER | Age: 55
End: 2020-12-10
Attending: INTERNAL MEDICINE
Payer: COMMERCIAL

## 2020-12-17 ENCOUNTER — APPOINTMENT (OUTPATIENT)
Dept: WOUND CARE | Facility: MEDICAL CENTER | Age: 55
End: 2020-12-17
Attending: INTERNAL MEDICINE
Payer: COMMERCIAL

## 2020-12-17 ENCOUNTER — TELEPHONE (OUTPATIENT)
Dept: MEDICAL GROUP | Facility: LAB | Age: 55
End: 2020-12-17

## 2020-12-17 DIAGNOSIS — Z79.4 TYPE 2 DIABETES MELLITUS WITH HYPERGLYCEMIA, WITH LONG-TERM CURRENT USE OF INSULIN (HCC): ICD-10-CM

## 2020-12-17 DIAGNOSIS — E11.65 TYPE 2 DIABETES MELLITUS WITH HYPERGLYCEMIA, WITH LONG-TERM CURRENT USE OF INSULIN (HCC): ICD-10-CM

## 2020-12-18 NOTE — TELEPHONE ENCOUNTER
Lani:  New order written for test strips to be used 1 3 times daily plus as needed.  Regards, Thiago Kauffman, DO

## 2020-12-18 NOTE — TELEPHONE ENCOUNTER
1. Caller Name: Mc                         Call Back Number: [phone] (home)        How would the patient prefer to be contacted with a response: Phone call OK to leave a detailed message    Pt is requesting a new Rx for One Touch Verio Flex blood glucose test strips.  He should be testing TID.

## 2021-01-18 ENCOUNTER — OFFICE VISIT (OUTPATIENT)
Dept: MEDICAL GROUP | Facility: LAB | Age: 56
End: 2021-01-18
Payer: COMMERCIAL

## 2021-01-18 VITALS
DIASTOLIC BLOOD PRESSURE: 92 MMHG | HEART RATE: 104 BPM | TEMPERATURE: 97.3 F | OXYGEN SATURATION: 99 % | BODY MASS INDEX: 31.99 KG/M2 | SYSTOLIC BLOOD PRESSURE: 142 MMHG | HEIGHT: 67 IN | WEIGHT: 203.8 LBS

## 2021-01-18 DIAGNOSIS — E11.65 TYPE 2 DIABETES MELLITUS WITH HYPERGLYCEMIA, WITH LONG-TERM CURRENT USE OF INSULIN (HCC): ICD-10-CM

## 2021-01-18 DIAGNOSIS — Z79.4 TYPE 2 DIABETES MELLITUS WITH HYPERGLYCEMIA, WITH LONG-TERM CURRENT USE OF INSULIN (HCC): ICD-10-CM

## 2021-01-18 DIAGNOSIS — E78.9 LIPID DISORDER: ICD-10-CM

## 2021-01-18 PROCEDURE — 99213 OFFICE O/P EST LOW 20 MIN: CPT | Performed by: INTERNAL MEDICINE

## 2021-01-18 RX ORDER — ATORVASTATIN CALCIUM 10 MG/1
10 TABLET, FILM COATED ORAL DAILY
Qty: 90 TAB | Refills: 3 | Status: SHIPPED | OUTPATIENT
Start: 2021-01-18 | End: 2021-04-19 | Stop reason: SDUPTHER

## 2021-01-18 ASSESSMENT — PATIENT HEALTH QUESTIONNAIRE - PHQ9: CLINICAL INTERPRETATION OF PHQ2 SCORE: 0

## 2021-01-18 ASSESSMENT — FIBROSIS 4 INDEX: FIB4 SCORE: 0.98

## 2021-01-18 NOTE — PROGRESS NOTES
CC: Mc Eugene is a 55 y.o. male is suffering from   Chief Complaint   Patient presents with   • Follow-Up         SUBJECTIVE:  1. Lipid disorder  Cristopher is here for follow-up we have discussed that because he is a diabetic he should be on statin drug therapy.  Prescription written    2. Type 2 diabetes mellitus with hyperglycemia, with long-term current use of insulin (HCC)  History of type 2 diabetes with excellent control currently at 5.9 no change in medical therapy        Past social, family, history: ,   Social History     Tobacco Use   • Smoking status: Former Smoker   • Smokeless tobacco: Never Used   Substance Use Topics   • Alcohol use: Not Currently   • Drug use: Yes     Comment: marijuana vape         MEDICATIONS:    Current Outpatient Medications:   •  atorvastatin (LIPITOR) 10 MG Tab, Take 1 Tab by mouth every day for 90 days., Disp: 90 Tab, Rfl: 3  •  Blood Glucose Test Strips, Test strips order: Test strips for One Touch Verio meter. Sig: use tid and prn ssx high or low sugar #100 RF x 11, Disp: 100 Each, Rfl: 11  •  NON SPECIFIED, You are low on Vitamin D please start 2,000 IU over the counter each day, Nature Made is a good brand!, Disp: 1 Each, Rfl: 0  •  vitamin D, Ergocalciferol, (DRISDOL) 1.25 MG (06290 UT) Cap capsule, Take 1 Cap by mouth every 7 days., Disp: 5 Cap, Rfl: 1  •  glucose blood (ACCU-CHEK GUIDE) strip, 1 Strip by Other route 3 times a day. Use one strip each time to test blood sugar three times a day (Patient not taking: Reported on 10/20/2020), Disp: 100 Strip, Rfl: 1  •  ketoconazole (NIZORAL) 2 % Cream, Apply 0.5 g to affected area(s) every day., Disp: 30 g, Rfl: 1  •  Insulin Lispro (HUMALOG KWIKPEN) 200 UNIT/ML Solution Pen-injector, Inject 1-6 Units as instructed 2 times a day, with meals. Sliding Scale 151- 200 = 1 unit 201- 250 = 2 units 251-300 = 3 units 301-350 = 4 units 351-400 = 5 units Over 400 = 6 units, Disp: , Rfl:     PROBLEMS:  Patient  "Active Problem List    Diagnosis Date Noted   • Diabetic foot infection (HCC) 08/01/2020     Priority: High   • Leukocytosis 09/08/2020     Priority: Medium   • Essential hypertension 08/03/2020     Priority: Medium   • Type 2 diabetes mellitus with hyperglycemia, with long-term current use of insulin (HCC) 08/01/2020     Priority: Medium   • Class 1 obesity due to excess calories without serious comorbidity with body mass index (BMI) of 33.0 to 33.9 in adult 08/01/2020     Priority: Low       REVIEW OF SYSTEMS:  Gen.:  No Nausea, Vomiting, fever, Chills.  Heart: No chest pain.  Lungs:  No shortness of Breath.  Psychological: Vincenzo unusual Anxiety depression     PHYSICAL EXAM   Constitutional: Alert, cooperative, not in acute distress.  Cardiovascular:  Rate Rhythm is regular without murmurs rubs clicks.     Thorax & Lungs: Clear to auscultation, no wheezing, rhonchi, or rales  HENT: Normocephalic, Atraumatic.  Eyes: PERRLA, EOMI, Conjunctiva normal.   Neck: Trachia is midline no swelling of the thyroid.   Lymphatic: No lymphadenopathy noted.   Neurologic: Alert & oriented x 3, cranial nerves II through XII are intact, Normal motor function, Normal sensory function, No focal deficits noted.   Psychiatric: Affect normal, Judgment normal, Mood normal.     VITAL SIGNS:/92 (BP Location: Left arm, Patient Position: Sitting, BP Cuff Size: Adult)   Pulse (!) 104   Temp 36.3 °C (97.3 °F) (Temporal)   Ht 1.702 m (5' 7\")   Wt 92.4 kg (203 lb 12.8 oz)   SpO2 99%   BMI 31.92 kg/m²     Labs: Reviewed    Assessment:                                                     Plan:    1. Lipid disorder  Atorvastatin prescribed recheck lipid profile 3 months  - atorvastatin (LIPITOR) 10 MG Tab; Take 1 Tab by mouth every day for 90 days.  Dispense: 90 Tab; Refill: 3  - LIPID PANEL  - HEMOGLOBIN A1C; Future  - Comp Metabolic Panel; Future    2. Type 2 diabetes mellitus with hyperglycemia, with long-term current use of insulin " (HCC)  Type 2 diabetes clinically stable recheck hemoglobin A1c  - LIPID PANEL  - HEMOGLOBIN A1C; Future

## 2021-03-15 DIAGNOSIS — Z23 NEED FOR VACCINATION: ICD-10-CM

## 2021-04-19 ENCOUNTER — OFFICE VISIT (OUTPATIENT)
Dept: MEDICAL GROUP | Facility: LAB | Age: 56
End: 2021-04-19
Payer: COMMERCIAL

## 2021-04-19 VITALS
TEMPERATURE: 97.8 F | BODY MASS INDEX: 32.49 KG/M2 | SYSTOLIC BLOOD PRESSURE: 142 MMHG | HEIGHT: 67 IN | WEIGHT: 207 LBS | RESPIRATION RATE: 16 BRPM | DIASTOLIC BLOOD PRESSURE: 80 MMHG | HEART RATE: 100 BPM | OXYGEN SATURATION: 97 %

## 2021-04-19 DIAGNOSIS — E11.65 TYPE 2 DIABETES MELLITUS WITH HYPERGLYCEMIA, WITH LONG-TERM CURRENT USE OF INSULIN (HCC): ICD-10-CM

## 2021-04-19 DIAGNOSIS — Z79.4 TYPE 2 DIABETES MELLITUS WITH HYPERGLYCEMIA, WITH LONG-TERM CURRENT USE OF INSULIN (HCC): ICD-10-CM

## 2021-04-19 DIAGNOSIS — E78.9 LIPID DISORDER: ICD-10-CM

## 2021-04-19 DIAGNOSIS — Z12.11 SCREEN FOR COLON CANCER: ICD-10-CM

## 2021-04-19 PROCEDURE — 99214 OFFICE O/P EST MOD 30 MIN: CPT | Performed by: INTERNAL MEDICINE

## 2021-04-19 RX ORDER — LISINOPRIL 5 MG/1
5 TABLET ORAL DAILY
Qty: 30 TABLET | Refills: 0 | Status: SHIPPED | OUTPATIENT
Start: 2021-04-19 | End: 2021-06-07

## 2021-04-19 RX ORDER — ATORVASTATIN CALCIUM 10 MG/1
10 TABLET, FILM COATED ORAL DAILY
Qty: 90 TABLET | Refills: 3 | Status: SHIPPED | OUTPATIENT
Start: 2021-04-19 | End: 2021-07-18

## 2021-04-19 RX ORDER — PREDNISOLONE ACETATE 10 MG/ML
SUSPENSION/ DROPS OPHTHALMIC
COMMUNITY
Start: 2021-02-25 | End: 2021-07-20

## 2021-04-19 ASSESSMENT — FIBROSIS 4 INDEX: FIB4 SCORE: 0.98

## 2021-04-20 NOTE — PROGRESS NOTES
CC: Mc Eugene is a 55 y.o. male is suffering from   Chief Complaint   Patient presents with   • Follow-Up     3 month         SUBJECTIVE:  1. Lipid disorder  Cristopher is here for follow-up has a history of dyslipidemia, is to continue on atorvastatin 10 mg    2. Screen for colon cancer  Patient is in need of colon cancer screening referral written    3. Type 2 diabetes mellitus with hyperglycemia, with long-term current use of insulin (HCC)  Clinically stable        Past social, family, history:   Social History     Tobacco Use   • Smoking status: Former Smoker   • Smokeless tobacco: Never Used   Substance Use Topics   • Alcohol use: Not Currently   • Drug use: Yes     Comment: marijuana vape         MEDICATIONS:    Current Outpatient Medications:   •  atorvastatin (LIPITOR) 10 MG Tab, Take 1 tablet by mouth every day for 90 days., Disp: 90 tablet, Rfl: 3  •  lisinopril (PRINIVIL) 5 MG Tab, Take 1 tablet by mouth every day for 30 days., Disp: 30 tablet, Rfl: 0  •  prednisoLONE acetate (PRED FORTE) 1 % Suspension, , Disp: , Rfl:   •  Blood Glucose Test Strips, Test strips order: Test strips for One Touch Verio meter. Sig: use tid and prn ssx high or low sugar #100 RF x 11, Disp: 100 Each, Rfl: 11  •  NON SPECIFIED, You are low on Vitamin D please start 2,000 IU over the counter each day, Nature Made is a good brand!, Disp: 1 Each, Rfl: 0  •  vitamin D, Ergocalciferol, (DRISDOL) 1.25 MG (07415 UT) Cap capsule, Take 1 Cap by mouth every 7 days., Disp: 5 Cap, Rfl: 1  •  glucose blood (ACCU-CHEK GUIDE) strip, 1 Strip by Other route 3 times a day. Use one strip each time to test blood sugar three times a day (Patient not taking: Reported on 10/20/2020), Disp: 100 Strip, Rfl: 1  •  ketoconazole (NIZORAL) 2 % Cream, Apply 0.5 g to affected area(s) every day., Disp: 30 g, Rfl: 1  •  Insulin Lispro (HUMALOG KWIKPEN) 200 UNIT/ML Solution Pen-injector, Inject 1-6 Units as instructed 2 times a day, with meals.  "Sliding Scale 151- 200 = 1 unit 201- 250 = 2 units 251-300 = 3 units 301-350 = 4 units 351-400 = 5 units Over 400 = 6 units, Disp: , Rfl:     PROBLEMS:  Patient Active Problem List    Diagnosis Date Noted   • Diabetic foot infection (HCC) 08/01/2020   • Leukocytosis 09/08/2020   • Essential hypertension 08/03/2020   • Type 2 diabetes mellitus with hyperglycemia, with long-term current use of insulin (HCC) 08/01/2020   • Class 1 obesity due to excess calories without serious comorbidity with body mass index (BMI) of 33.0 to 33.9 in adult 08/01/2020       REVIEW OF SYSTEMS:  Gen.:  No Nausea, Vomiting, fever, Chills.  Heart: No chest pain.  Lungs:  No shortness of Breath.  Psychological: Vincenzo unusual Anxiety depression     PHYSICAL EXAM   Constitutional: Alert, cooperative, not in acute distress.  Cardiovascular:  Rate Rhythm is regular without murmurs rubs clicks.     Thorax & Lungs: Clear to auscultation, no wheezing, rhonchi, or rales  HENT: Normocephalic, Atraumatic.  Eyes: PERRLA, EOMI, Conjunctiva normal.   Neck: Trachia is midline no swelling of the thyroid.   Lymphatic: No lymphadenopathy noted.   Neurologic: Alert & oriented x 3, cranial nerves II through XII are intact, Normal motor function, Normal sensory function, No focal deficits noted.   Psychiatric: Affect normal, Judgment normal, Mood normal.     VITAL SIGNS:/80   Pulse 100   Temp 36.6 °C (97.8 °F) (Temporal)   Resp 16   Ht 1.702 m (5' 7\")   Wt 93.9 kg (207 lb)   SpO2 97%   BMI 32.42 kg/m²     Labs: Reviewed    Assessment:                                                     Plan:    1. Lipid disorder  Continue Lipitor 10 mg  - atorvastatin (LIPITOR) 10 MG Tab; Take 1 tablet by mouth every day for 90 days.  Dispense: 90 tablet; Refill: 3  - VITAMIN D,25 HYDROXY; Future  - LIPID PANEL  - HEMOGLOBIN A1C; Future    2. Screen for colon cancer  Referral written to GI consultants  - REFERRAL TO GI FOR COLONOSCOPY  - VITAMIN D,25 HYDROXY; " Future  - LIPID PANEL  - HEMOGLOBIN A1C; Future    3. Type 2 diabetes mellitus with hyperglycemia, with long-term current use of insulin (HCC)  Continue lisinopril recheck hemoglobin A1c in 3 months  - lisinopril (PRINIVIL) 5 MG Tab; Take 1 tablet by mouth every day for 30 days.  Dispense: 30 tablet; Refill: 0  - VITAMIN D,25 HYDROXY; Future  - LIPID PANEL  - HEMOGLOBIN A1C; Future

## 2021-04-21 ENCOUNTER — HOSPITAL ENCOUNTER (OUTPATIENT)
Dept: LAB | Facility: MEDICAL CENTER | Age: 56
End: 2021-04-21
Attending: INTERNAL MEDICINE
Payer: COMMERCIAL

## 2021-04-21 DIAGNOSIS — Z79.4 TYPE 2 DIABETES MELLITUS WITH HYPERGLYCEMIA, WITH LONG-TERM CURRENT USE OF INSULIN (HCC): ICD-10-CM

## 2021-04-21 DIAGNOSIS — Z12.11 SCREEN FOR COLON CANCER: ICD-10-CM

## 2021-04-21 DIAGNOSIS — E78.9 LIPID DISORDER: ICD-10-CM

## 2021-04-21 DIAGNOSIS — E11.65 TYPE 2 DIABETES MELLITUS WITH HYPERGLYCEMIA, WITH LONG-TERM CURRENT USE OF INSULIN (HCC): ICD-10-CM

## 2021-04-21 PROCEDURE — 83036 HEMOGLOBIN GLYCOSYLATED A1C: CPT

## 2021-04-21 PROCEDURE — 80053 COMPREHEN METABOLIC PANEL: CPT

## 2021-04-21 PROCEDURE — 82306 VITAMIN D 25 HYDROXY: CPT

## 2021-04-21 PROCEDURE — 80061 LIPID PANEL: CPT

## 2021-04-21 PROCEDURE — 36415 COLL VENOUS BLD VENIPUNCTURE: CPT

## 2021-04-22 LAB
ALBUMIN SERPL BCP-MCNC: 4.1 G/DL (ref 3.2–4.9)
ALBUMIN/GLOB SERPL: 1.4 G/DL
ALP SERPL-CCNC: 69 U/L (ref 30–99)
ALT SERPL-CCNC: 27 U/L (ref 2–50)
ANION GAP SERPL CALC-SCNC: 10 MMOL/L (ref 7–16)
AST SERPL-CCNC: 26 U/L (ref 12–45)
BILIRUB SERPL-MCNC: 0.3 MG/DL (ref 0.1–1.5)
BUN SERPL-MCNC: 20 MG/DL (ref 8–22)
CALCIUM SERPL-MCNC: 9.3 MG/DL (ref 8.5–10.5)
CHLORIDE SERPL-SCNC: 106 MMOL/L (ref 96–112)
CHOLEST SERPL-MCNC: 192 MG/DL (ref 100–199)
CO2 SERPL-SCNC: 24 MMOL/L (ref 20–33)
CREAT SERPL-MCNC: 1 MG/DL (ref 0.5–1.4)
EST. AVERAGE GLUCOSE BLD GHB EST-MCNC: 148 MG/DL
GLOBULIN SER CALC-MCNC: 2.9 G/DL (ref 1.9–3.5)
GLUCOSE SERPL-MCNC: 104 MG/DL (ref 65–99)
HBA1C MFR BLD: 6.8 % (ref 4–5.6)
HDLC SERPL-MCNC: 36 MG/DL
LDLC SERPL CALC-MCNC: 115 MG/DL
POTASSIUM SERPL-SCNC: 4.9 MMOL/L (ref 3.6–5.5)
PROT SERPL-MCNC: 7 G/DL (ref 6–8.2)
SODIUM SERPL-SCNC: 140 MMOL/L (ref 135–145)
TRIGL SERPL-MCNC: 204 MG/DL (ref 0–149)

## 2021-04-23 LAB — 25(OH)D3 SERPL-MCNC: 21 NG/ML (ref 30–80)

## 2021-05-25 ENCOUNTER — HOSPITAL ENCOUNTER (OUTPATIENT)
Dept: LAB | Facility: MEDICAL CENTER | Age: 56
End: 2021-05-25
Attending: INTERNAL MEDICINE
Payer: COMMERCIAL

## 2021-05-25 DIAGNOSIS — N18.9 CHRONIC KIDNEY DISEASE, UNSPECIFIED CKD STAGE: ICD-10-CM

## 2021-05-25 DIAGNOSIS — N17.9 AKI (ACUTE KIDNEY INJURY) (HCC): ICD-10-CM

## 2021-05-25 LAB
ANION GAP SERPL CALC-SCNC: 10 MMOL/L (ref 7–16)
BUN SERPL-MCNC: 24 MG/DL (ref 8–22)
CALCIUM SERPL-MCNC: 8.8 MG/DL (ref 8.4–10.2)
CHLORIDE SERPL-SCNC: 107 MMOL/L (ref 96–112)
CO2 SERPL-SCNC: 22 MMOL/L (ref 20–33)
CREAT SERPL-MCNC: 1.23 MG/DL (ref 0.5–1.4)
CREAT UR-MCNC: 186.51 MG/DL
GLUCOSE SERPL-MCNC: 97 MG/DL (ref 65–99)
MICROALBUMIN UR-MCNC: 228 MG/DL
MICROALBUMIN/CREAT UR: 1222 MG/G (ref 0–30)
POTASSIUM SERPL-SCNC: 5 MMOL/L (ref 3.6–5.5)
SODIUM SERPL-SCNC: 139 MMOL/L (ref 135–145)

## 2021-05-25 PROCEDURE — 80048 BASIC METABOLIC PNL TOTAL CA: CPT

## 2021-05-25 PROCEDURE — 36415 COLL VENOUS BLD VENIPUNCTURE: CPT

## 2021-05-25 PROCEDURE — 82043 UR ALBUMIN QUANTITATIVE: CPT

## 2021-05-25 PROCEDURE — 82570 ASSAY OF URINE CREATININE: CPT

## 2021-06-02 ENCOUNTER — OFFICE VISIT (OUTPATIENT)
Dept: NEPHROLOGY | Facility: MEDICAL CENTER | Age: 56
End: 2021-06-02
Payer: COMMERCIAL

## 2021-06-02 VITALS
SYSTOLIC BLOOD PRESSURE: 120 MMHG | OXYGEN SATURATION: 99 % | BODY MASS INDEX: 31.37 KG/M2 | WEIGHT: 207 LBS | HEART RATE: 98 BPM | TEMPERATURE: 98.3 F | DIASTOLIC BLOOD PRESSURE: 80 MMHG | HEIGHT: 68 IN

## 2021-06-02 DIAGNOSIS — R80.9 PROTEINURIA, UNSPECIFIED TYPE: ICD-10-CM

## 2021-06-02 DIAGNOSIS — N18.9 CHRONIC KIDNEY DISEASE, UNSPECIFIED CKD STAGE: ICD-10-CM

## 2021-06-02 DIAGNOSIS — E11.65 TYPE 2 DIABETES MELLITUS WITH HYPERGLYCEMIA, WITH LONG-TERM CURRENT USE OF INSULIN (HCC): ICD-10-CM

## 2021-06-02 DIAGNOSIS — Z79.4 TYPE 2 DIABETES MELLITUS WITH HYPERGLYCEMIA, WITH LONG-TERM CURRENT USE OF INSULIN (HCC): ICD-10-CM

## 2021-06-02 PROCEDURE — 99214 OFFICE O/P EST MOD 30 MIN: CPT | Performed by: INTERNAL MEDICINE

## 2021-06-02 ASSESSMENT — ENCOUNTER SYMPTOMS
SHORTNESS OF BREATH: 0
FEVER: 0
CHILLS: 0
COUGH: 0
VOMITING: 0
NAUSEA: 0

## 2021-06-02 ASSESSMENT — FIBROSIS 4 INDEX: FIB4 SCORE: 1.05

## 2021-06-02 NOTE — PROGRESS NOTES
"Subjective:      Mc Eugene is a 55 y.o. male who presents with Chronic Kidney Disease            Patient has a history of longstanding diabetes mellitus, his diabetes has been reasonably well controlled.  Patient has no hematuria no dysuria, no recent use of NSAIDs or IV contrast exposure.    Chronic Kidney Disease  This is a chronic problem. The current episode started more than 1 year ago. The problem occurs constantly. The problem has been unchanged. Pertinent negatives include no chest pain, chills, coughing, fever, nausea, urinary symptoms or vomiting. Exacerbated by: DM.       Review of Systems   Constitutional: Negative for chills, fever and malaise/fatigue.   Respiratory: Negative for cough and shortness of breath.    Cardiovascular: Negative for chest pain and leg swelling.   Gastrointestinal: Negative for nausea and vomiting.   Genitourinary: Negative for dysuria, frequency and urgency.          Objective:     /80 (BP Location: Right arm, Patient Position: Sitting)   Pulse 98   Temp 36.8 °C (98.3 °F)   Ht 1.727 m (5' 8\")   Wt 93.9 kg (207 lb)   SpO2 99%   BMI 31.47 kg/m²      Physical Exam  Vitals and nursing note reviewed.   Constitutional:       General: He is not in acute distress.     Appearance: He is not ill-appearing.   HENT:      Head: Normocephalic and atraumatic.      Right Ear: External ear normal.      Left Ear: External ear normal.      Nose: Nose normal.   Eyes:      General:         Right eye: No discharge.         Left eye: No discharge.      Conjunctiva/sclera: Conjunctivae normal.   Cardiovascular:      Rate and Rhythm: Normal rate and regular rhythm.      Heart sounds: No murmur heard.     Pulmonary:      Effort: Pulmonary effort is normal. No respiratory distress.      Breath sounds: Normal breath sounds. No wheezing.   Musculoskeletal:         General: No tenderness or deformity.      Right lower leg: No edema.      Left lower leg: No edema.   Skin:     General: " Skin is warm.   Neurological:      General: No focal deficit present.      Mental Status: He is alert and oriented to person, place, and time.   Psychiatric:         Mood and Affect: Mood normal.         Behavior: Behavior normal.       Past Medical History:   Diagnosis Date   • Diabetes (HCC)    • Kidney anomaly, congenital     born with 1 kidney     Social History     Socioeconomic History   • Marital status:      Spouse name: Not on file   • Number of children: Not on file   • Years of education: Not on file   • Highest education level: Not on file   Occupational History   • Not on file   Tobacco Use   • Smoking status: Former Smoker   • Smokeless tobacco: Never Used   Vaping Use   • Vaping Use: Every day   Substance and Sexual Activity   • Alcohol use: Not Currently   • Drug use: Yes     Comment: marijuana vape   • Sexual activity: Not on file   Other Topics Concern   • Not on file   Social History Narrative   • Not on file     Social Determinants of Health     Financial Resource Strain:    • Difficulty of Paying Living Expenses:    Food Insecurity:    • Worried About Running Out of Food in the Last Year:    • Ran Out of Food in the Last Year:    Transportation Needs:    • Lack of Transportation (Medical):    • Lack of Transportation (Non-Medical):    Physical Activity:    • Days of Exercise per Week:    • Minutes of Exercise per Session:    Stress:    • Feeling of Stress :    Social Connections:    • Frequency of Communication with Friends and Family:    • Frequency of Social Gatherings with Friends and Family:    • Attends Yazidi Services:    • Active Member of Clubs or Organizations:    • Attends Club or Organization Meetings:    • Marital Status:    Intimate Partner Violence:    • Fear of Current or Ex-Partner:    • Emotionally Abused:    • Physically Abused:    • Sexually Abused:      Family History   Problem Relation Age of Onset   • Lung Disease Mother    • Cancer Mother      Recent Labs      09/09/20  0331 09/09/20  0331 09/10/20  0231 09/10/20  0231 09/11/20  0452 09/11/20  0452 09/12/20  0244 10/14/20  1200 10/14/20  1204 10/14/20  2107 10/14/20  2107 10/19/20  1240 10/19/20  1240 10/28/20  1314 04/21/21  1244 05/25/21  1300   ALBUMIN  --   --  3.3   < > 3.4   < > 3.5  --    < > 4.1  --  4.3  --   --  4.1  --    HDL 30*  --   --   --   --   --   --  41  --   --   --   --   --   --  36*  --    TRIGLYCERIDE 224*  --   --   --   --   --   --  177*  --   --   --   --   --   --  204*  --    SODIUM 140   < > 139   < > 139   < > 138  --    < > 136   < > 138   < > 138 140 139   POTASSIUM 4.1   < > 3.7   < > 4.5   < > 4.6  --    < > 5.8*   < > 5.9*   < > 4.9 4.9 5.0   CHLORIDE 105   < > 103   < > 105   < > 105  --    < > 104   < > 106   < > 105 106 107   CO2 22   < > 24   < > 25   < > 20  --    < > 19*   < > 22   < > 23 24 22   BUN 14   < > 13   < > 13   < > 13  --    < > 40*   < > 28*   < > 28* 20 24*   CREATININE 0.79   < > 0.92   < > 1.00   < > 1.04  --    < > 2.39*   < > 1.31   < > 1.05 1.00 1.23   PHOSPHORUS  --   --  3.6  --  3.3  --  3.1  --   --   --   --   --   --   --   --   --     < > = values in this interval not displayed.                      Assessment/Plan:        1. Chronic kidney disease, unspecified CKD stage  Stable  No uremic symptoms  Renal dose of medication  Avoid nephrotoxins  Continue same medication regimen      2. Proteinuria, unspecified type  Consider adding ACE inhibitor if blood pressure tolerates    3. Type 2 diabetes mellitus with hyperglycemia, with long-term current use of insulin (HCC)  Continue to manage diabetes control

## 2021-06-07 DIAGNOSIS — Z79.4 TYPE 2 DIABETES MELLITUS WITH HYPERGLYCEMIA, WITH LONG-TERM CURRENT USE OF INSULIN (HCC): ICD-10-CM

## 2021-06-07 DIAGNOSIS — E11.65 TYPE 2 DIABETES MELLITUS WITH HYPERGLYCEMIA, WITH LONG-TERM CURRENT USE OF INSULIN (HCC): ICD-10-CM

## 2021-06-07 RX ORDER — LISINOPRIL 5 MG/1
TABLET ORAL
Qty: 90 TABLET | Refills: 3 | Status: SHIPPED | OUTPATIENT
Start: 2021-06-07 | End: 2022-06-13

## 2021-07-20 ENCOUNTER — OFFICE VISIT (OUTPATIENT)
Dept: MEDICAL GROUP | Facility: LAB | Age: 56
End: 2021-07-20
Payer: COMMERCIAL

## 2021-07-20 VITALS
SYSTOLIC BLOOD PRESSURE: 125 MMHG | RESPIRATION RATE: 14 BRPM | OXYGEN SATURATION: 95 % | BODY MASS INDEX: 32.43 KG/M2 | WEIGHT: 214 LBS | HEIGHT: 68 IN | DIASTOLIC BLOOD PRESSURE: 80 MMHG | HEART RATE: 98 BPM | TEMPERATURE: 97.8 F

## 2021-07-20 DIAGNOSIS — Z12.5 SCREENING FOR PROSTATE CANCER: ICD-10-CM

## 2021-07-20 DIAGNOSIS — Z12.11 SCREEN FOR COLON CANCER: ICD-10-CM

## 2021-07-20 DIAGNOSIS — Z79.4 TYPE 2 DIABETES MELLITUS WITH HYPERGLYCEMIA, WITH LONG-TERM CURRENT USE OF INSULIN (HCC): ICD-10-CM

## 2021-07-20 DIAGNOSIS — M72.0 DUPUYTREN CONTRACTURE: ICD-10-CM

## 2021-07-20 DIAGNOSIS — E11.65 TYPE 2 DIABETES MELLITUS WITH HYPERGLYCEMIA, WITH LONG-TERM CURRENT USE OF INSULIN (HCC): ICD-10-CM

## 2021-07-20 PROCEDURE — 99214 OFFICE O/P EST MOD 30 MIN: CPT | Performed by: INTERNAL MEDICINE

## 2021-07-20 RX ORDER — ATORVASTATIN CALCIUM 10 MG/1
10 TABLET, FILM COATED ORAL EVERY MORNING
COMMUNITY
Start: 2021-07-19 | End: 2022-04-29

## 2021-07-20 ASSESSMENT — FIBROSIS 4 INDEX: FIB4 SCORE: 1.05

## 2021-07-20 NOTE — PROGRESS NOTES
CC: Mc Eugene is a 55 y.o. male is suffering from   Chief Complaint   Patient presents with   • Diabetes     3 months follow up         SUBJECTIVE:  1. Type 2 diabetes mellitus with hyperglycemia, with long-term current use of insulin (HCC)  Mc is here for follow up has excellent control over his diabetes.  He is doing good, we have discussed his need to continue to watch his diet exercise    2. Screen for colon cancer  Referral written for screening for colon cancer    3. Screening for prostate cancer  PSA ordered    4. Dupuytren contracture  Bilateral diffuse trimmed contractures hands, left is worse than right        Past social, family, history:   Social History     Tobacco Use   • Smoking status: Former Smoker   • Smokeless tobacco: Never Used   Vaping Use   • Vaping Use: Every day   Substance Use Topics   • Alcohol use: Not Currently   • Drug use: Yes     Comment: marijuana vape         MEDICATIONS:    Current Outpatient Medications:   •  atorvastatin (LIPITOR) 10 MG Tab, , Disp: , Rfl:   •  lisinopril (PRINIVIL) 5 MG Tab, TAKE ONE TABLET BY MOUTH DAILY, Disp: 90 tablet, Rfl: 3  •  NON SPECIFIED, You are low on Vitamin D please start 2,000 IU over the counter each day, Nature Made is a good brand!, Disp: 1 Each, Rfl: 0  •  Blood Glucose Test Strips, Test strips order: Test strips for One Touch Verio meter. Sig: use tid and prn ssx high or low sugar #100 RF x 11, Disp: 100 Each, Rfl: 11    PROBLEMS:  Patient Active Problem List    Diagnosis Date Noted   • Leukocytosis 09/08/2020   • Essential hypertension 08/03/2020   • Type 2 diabetes mellitus with hyperglycemia, with long-term current use of insulin (HCC) 08/01/2020   • Diabetic foot infection (HCC) 08/01/2020   • Class 1 obesity due to excess calories without serious comorbidity with body mass index (BMI) of 33.0 to 33.9 in adult 08/01/2020       REVIEW OF SYSTEMS:  Gen.:  No Nausea, Vomiting, fever, Chills.  Heart: No chest pain.  Lungs:   "No shortness of Breath.  Psychological: Vincenzo unusual Anxiety depression     PHYSICAL EXAM   Constitutional: Alert, cooperative, not in acute distress.  Cardiovascular:  Rate Rhythm is regular without murmurs rubs clicks.     Thorax & Lungs: Clear to auscultation, no wheezing, rhonchi, or rales  HENT: Normocephalic, Atraumatic.  Eyes: PERRLA, EOMI, Conjunctiva normal.   Neck: Trachia is midline no swelling of the thyroid.   Lymphatic: No lymphadenopathy noted.   Musculoskeletal: Putrid and contractures left worse than right hand  Neurologic: Alert & oriented x 3, cranial nerves II through XII are intact, Normal motor function, Normal sensory function, No focal deficits noted.   Psychiatric: Affect normal, Judgment normal, Mood normal.     VITAL SIGNS:/80   Pulse 98   Temp 36.6 °C (97.8 °F) (Temporal)   Resp 14   Ht 1.727 m (5' 8\")   Wt 97.1 kg (214 lb)   SpO2 95%   BMI 32.54 kg/m²     Labs: Reviewed    Assessment:                                                     Plan:    1. Type 2 diabetes mellitus with hyperglycemia, with long-term current use of insulin (HCC)  No evidence of diabetic neuropathy to monofilament wire testing recheck hemoglobin A1c in 3 to 4 months  - Diabetic Monofilament Lower Extremity Exam  - HEMOGLOBIN A1C; Future    2. Screen for colon cancer  Referral written  - REFERRAL TO GI FOR COLONOSCOPY    3. Screening for prostate cancer  Orders written  - PROSTATE SPECIFIC AG SCREENING; Future    4. Dupuytren contracture  Referral written to orthopedics  - PROSTATE SPECIFIC AG SCREENING; Future  - REFERRAL TO ORTHOPEDICS        "

## 2021-08-09 ENCOUNTER — HOSPITAL ENCOUNTER (OUTPATIENT)
Dept: LAB | Facility: MEDICAL CENTER | Age: 56
End: 2021-08-09
Attending: INTERNAL MEDICINE
Payer: COMMERCIAL

## 2021-08-09 DIAGNOSIS — E11.65 TYPE 2 DIABETES MELLITUS WITH HYPERGLYCEMIA, WITH LONG-TERM CURRENT USE OF INSULIN (HCC): ICD-10-CM

## 2021-08-09 DIAGNOSIS — Z79.4 TYPE 2 DIABETES MELLITUS WITH HYPERGLYCEMIA, WITH LONG-TERM CURRENT USE OF INSULIN (HCC): ICD-10-CM

## 2021-08-09 DIAGNOSIS — M72.0 DUPUYTREN CONTRACTURE: ICD-10-CM

## 2021-08-09 DIAGNOSIS — Z12.5 SCREENING FOR PROSTATE CANCER: ICD-10-CM

## 2021-08-09 LAB
EST. AVERAGE GLUCOSE BLD GHB EST-MCNC: 140 MG/DL
HBA1C MFR BLD: 6.5 % (ref 4–5.6)
PSA SERPL-MCNC: 0.31 NG/ML (ref 0–4)

## 2021-08-09 PROCEDURE — 84153 ASSAY OF PSA TOTAL: CPT

## 2021-08-09 PROCEDURE — 83036 HEMOGLOBIN GLYCOSYLATED A1C: CPT

## 2021-08-09 PROCEDURE — 36415 COLL VENOUS BLD VENIPUNCTURE: CPT

## 2021-08-11 PROBLEM — M72.0 DUPUYTREN CONTRACTURE: Status: ACTIVE | Noted: 2021-08-11

## 2021-09-07 ENCOUNTER — TELEMEDICINE (OUTPATIENT)
Dept: ADMISSIONS | Facility: MEDICAL CENTER | Age: 56
End: 2021-09-07
Attending: ORTHOPAEDIC SURGERY
Payer: COMMERCIAL

## 2021-09-07 RX ORDER — MULTIVIT WITH MINERALS/LUTEIN
1000 TABLET ORAL 2 TIMES DAILY
COMMUNITY

## 2021-09-07 RX ORDER — VITAMIN B COMPLEX
2000 TABLET ORAL 2 TIMES DAILY
COMMUNITY

## 2021-09-20 ENCOUNTER — APPOINTMENT (OUTPATIENT)
Dept: ADMISSIONS | Facility: MEDICAL CENTER | Age: 56
End: 2021-09-20
Attending: ORTHOPAEDIC SURGERY
Payer: COMMERCIAL

## 2021-09-20 DIAGNOSIS — Z01.810 PRE-OPERATIVE CARDIOVASCULAR EXAMINATION: ICD-10-CM

## 2021-09-20 DIAGNOSIS — Z01.812 PRE-OPERATIVE LABORATORY EXAMINATION: ICD-10-CM

## 2021-09-20 LAB
ANION GAP SERPL CALC-SCNC: 11 MMOL/L (ref 7–16)
BUN SERPL-MCNC: 19 MG/DL (ref 8–22)
CALCIUM SERPL-MCNC: 9.3 MG/DL (ref 8.5–10.5)
CHLORIDE SERPL-SCNC: 102 MMOL/L (ref 96–112)
CO2 SERPL-SCNC: 23 MMOL/L (ref 20–33)
CREAT SERPL-MCNC: 1.1 MG/DL (ref 0.5–1.4)
EKG IMPRESSION: NORMAL
GLUCOSE SERPL-MCNC: 131 MG/DL (ref 65–99)
POTASSIUM SERPL-SCNC: 5.2 MMOL/L (ref 3.6–5.5)
SARS-COV-2 RNA RESP QL NAA+PROBE: NOTDETECTED
SODIUM SERPL-SCNC: 136 MMOL/L (ref 135–145)
SPECIMEN SOURCE: NORMAL

## 2021-09-20 PROCEDURE — C9803 HOPD COVID-19 SPEC COLLECT: HCPCS

## 2021-09-20 PROCEDURE — U0003 INFECTIOUS AGENT DETECTION BY NUCLEIC ACID (DNA OR RNA); SEVERE ACUTE RESPIRATORY SYNDROME CORONAVIRUS 2 (SARS-COV-2) (CORONAVIRUS DISEASE [COVID-19]), AMPLIFIED PROBE TECHNIQUE, MAKING USE OF HIGH THROUGHPUT TECHNOLOGIES AS DESCRIBED BY CMS-2020-01-R: HCPCS

## 2021-09-20 PROCEDURE — 93010 ELECTROCARDIOGRAM REPORT: CPT | Performed by: INTERNAL MEDICINE

## 2021-09-20 PROCEDURE — 93005 ELECTROCARDIOGRAM TRACING: CPT

## 2021-09-20 PROCEDURE — U0005 INFEC AGEN DETEC AMPLI PROBE: HCPCS

## 2021-09-20 PROCEDURE — 80048 BASIC METABOLIC PNL TOTAL CA: CPT

## 2021-09-20 PROCEDURE — 36415 COLL VENOUS BLD VENIPUNCTURE: CPT

## 2021-09-23 ENCOUNTER — ANESTHESIA EVENT (OUTPATIENT)
Dept: SURGERY | Facility: MEDICAL CENTER | Age: 56
End: 2021-09-23
Payer: COMMERCIAL

## 2021-09-23 ENCOUNTER — HOSPITAL ENCOUNTER (OUTPATIENT)
Facility: MEDICAL CENTER | Age: 56
End: 2021-09-23
Attending: ORTHOPAEDIC SURGERY | Admitting: ORTHOPAEDIC SURGERY
Payer: COMMERCIAL

## 2021-09-23 ENCOUNTER — ANESTHESIA (OUTPATIENT)
Dept: SURGERY | Facility: MEDICAL CENTER | Age: 56
End: 2021-09-23
Payer: COMMERCIAL

## 2021-09-23 VITALS
WEIGHT: 202.38 LBS | BODY MASS INDEX: 30.67 KG/M2 | HEIGHT: 68 IN | OXYGEN SATURATION: 95 % | RESPIRATION RATE: 17 BRPM | TEMPERATURE: 97.4 F | DIASTOLIC BLOOD PRESSURE: 78 MMHG | HEART RATE: 93 BPM | SYSTOLIC BLOOD PRESSURE: 166 MMHG

## 2021-09-23 DIAGNOSIS — M72.0 DUPUYTREN CONTRACTURE: ICD-10-CM

## 2021-09-23 LAB
GLUCOSE BLD-MCNC: 115 MG/DL (ref 65–99)
PATHOLOGY CONSULT NOTE: NORMAL

## 2021-09-23 PROCEDURE — 700101 HCHG RX REV CODE 250: Performed by: ORTHOPAEDIC SURGERY

## 2021-09-23 PROCEDURE — 160038 HCHG SURGERY MINUTES - EA ADDL 1 MIN LEVEL 2: Performed by: ORTHOPAEDIC SURGERY

## 2021-09-23 PROCEDURE — 160048 HCHG OR STATISTICAL LEVEL 1-5: Performed by: ORTHOPAEDIC SURGERY

## 2021-09-23 PROCEDURE — 700111 HCHG RX REV CODE 636 W/ 250 OVERRIDE (IP): Performed by: ANESTHESIOLOGY

## 2021-09-23 PROCEDURE — 160046 HCHG PACU - 1ST 60 MINS PHASE II: Performed by: ORTHOPAEDIC SURGERY

## 2021-09-23 PROCEDURE — 88304 TISSUE EXAM BY PATHOLOGIST: CPT

## 2021-09-23 PROCEDURE — 82962 GLUCOSE BLOOD TEST: CPT

## 2021-09-23 PROCEDURE — 700101 HCHG RX REV CODE 250

## 2021-09-23 PROCEDURE — 160009 HCHG ANES TIME/MIN: Performed by: ORTHOPAEDIC SURGERY

## 2021-09-23 PROCEDURE — 160025 RECOVERY II MINUTES (STATS): Performed by: ORTHOPAEDIC SURGERY

## 2021-09-23 PROCEDURE — 700101 HCHG RX REV CODE 250: Performed by: ANESTHESIOLOGY

## 2021-09-23 PROCEDURE — 26123 RELEASE PALM CONTRACTURE: CPT | Mod: F2 | Performed by: ORTHOPAEDIC SURGERY

## 2021-09-23 PROCEDURE — 160002 HCHG RECOVERY MINUTES (STAT): Performed by: ORTHOPAEDIC SURGERY

## 2021-09-23 PROCEDURE — 26125 RELEASE PALM CONTRACTURE: CPT | Mod: 59,F3 | Performed by: ORTHOPAEDIC SURGERY

## 2021-09-23 PROCEDURE — A9270 NON-COVERED ITEM OR SERVICE: HCPCS | Performed by: ANESTHESIOLOGY

## 2021-09-23 PROCEDURE — 700105 HCHG RX REV CODE 258: Performed by: ORTHOPAEDIC SURGERY

## 2021-09-23 PROCEDURE — 160035 HCHG PACU - 1ST 60 MINS PHASE I: Performed by: ORTHOPAEDIC SURGERY

## 2021-09-23 PROCEDURE — 501838 HCHG SUTURE GENERAL: Performed by: ORTHOPAEDIC SURGERY

## 2021-09-23 PROCEDURE — 700102 HCHG RX REV CODE 250 W/ 637 OVERRIDE(OP): Performed by: ANESTHESIOLOGY

## 2021-09-23 PROCEDURE — 160027 HCHG SURGERY MINUTES - 1ST 30 MINS LEVEL 2: Performed by: ORTHOPAEDIC SURGERY

## 2021-09-23 RX ORDER — CELECOXIB 200 MG/1
200 CAPSULE ORAL ONCE
Status: COMPLETED | OUTPATIENT
Start: 2021-09-23 | End: 2021-09-23

## 2021-09-23 RX ORDER — MIDAZOLAM HYDROCHLORIDE 1 MG/ML
INJECTION INTRAMUSCULAR; INTRAVENOUS PRN
Status: DISCONTINUED | OUTPATIENT
Start: 2021-09-23 | End: 2021-09-23 | Stop reason: SURG

## 2021-09-23 RX ORDER — ACETAMINOPHEN 500 MG
1000 TABLET ORAL ONCE
Status: COMPLETED | OUTPATIENT
Start: 2021-09-23 | End: 2021-09-23

## 2021-09-23 RX ORDER — ONDANSETRON 2 MG/ML
4 INJECTION INTRAMUSCULAR; INTRAVENOUS
Status: DISCONTINUED | OUTPATIENT
Start: 2021-09-23 | End: 2021-09-23 | Stop reason: HOSPADM

## 2021-09-23 RX ORDER — BUPIVACAINE HYDROCHLORIDE 5 MG/ML
INJECTION, SOLUTION EPIDURAL; INTRACAUDAL
Status: DISCONTINUED
Start: 2021-09-23 | End: 2021-09-23 | Stop reason: HOSPADM

## 2021-09-23 RX ORDER — MEPERIDINE HYDROCHLORIDE 25 MG/ML
6.25 INJECTION INTRAMUSCULAR; INTRAVENOUS; SUBCUTANEOUS
Status: DISCONTINUED | OUTPATIENT
Start: 2021-09-23 | End: 2021-09-23 | Stop reason: HOSPADM

## 2021-09-23 RX ORDER — CEFAZOLIN SODIUM 2 G/100ML
2 INJECTION, SOLUTION INTRAVENOUS ONCE
Status: DISCONTINUED | OUTPATIENT
Start: 2021-09-23 | End: 2021-09-23 | Stop reason: HOSPADM

## 2021-09-23 RX ORDER — SODIUM CHLORIDE, SODIUM LACTATE, POTASSIUM CHLORIDE, CALCIUM CHLORIDE 600; 310; 30; 20 MG/100ML; MG/100ML; MG/100ML; MG/100ML
INJECTION, SOLUTION INTRAVENOUS CONTINUOUS
Status: DISCONTINUED | OUTPATIENT
Start: 2021-09-23 | End: 2021-09-23 | Stop reason: HOSPADM

## 2021-09-23 RX ORDER — ONDANSETRON 2 MG/ML
INJECTION INTRAMUSCULAR; INTRAVENOUS PRN
Status: DISCONTINUED | OUTPATIENT
Start: 2021-09-23 | End: 2021-09-23 | Stop reason: SURG

## 2021-09-23 RX ORDER — LIDOCAINE HYDROCHLORIDE 10 MG/ML
INJECTION, SOLUTION INFILTRATION; PERINEURAL
Status: DISCONTINUED | OUTPATIENT
Start: 2021-09-23 | End: 2021-09-23 | Stop reason: HOSPADM

## 2021-09-23 RX ORDER — BUPIVACAINE HYDROCHLORIDE 5 MG/ML
INJECTION, SOLUTION PERINEURAL
Status: DISCONTINUED | OUTPATIENT
Start: 2021-09-23 | End: 2021-09-23 | Stop reason: HOSPADM

## 2021-09-23 RX ORDER — LORAZEPAM 2 MG/ML
0.5 INJECTION INTRAMUSCULAR
Status: DISCONTINUED | OUTPATIENT
Start: 2021-09-23 | End: 2021-09-23 | Stop reason: HOSPADM

## 2021-09-23 RX ORDER — LIDOCAINE HYDROCHLORIDE 10 MG/ML
INJECTION, SOLUTION EPIDURAL; INFILTRATION; INTRACAUDAL; PERINEURAL
Status: DISCONTINUED
Start: 2021-09-23 | End: 2021-09-23 | Stop reason: HOSPADM

## 2021-09-23 RX ORDER — HALOPERIDOL 5 MG/ML
1 INJECTION INTRAMUSCULAR
Status: DISCONTINUED | OUTPATIENT
Start: 2021-09-23 | End: 2021-09-23 | Stop reason: HOSPADM

## 2021-09-23 RX ORDER — OXYCODONE HCL 5 MG/5 ML
5 SOLUTION, ORAL ORAL
Status: DISCONTINUED | OUTPATIENT
Start: 2021-09-23 | End: 2021-09-23 | Stop reason: HOSPADM

## 2021-09-23 RX ORDER — DIPHENHYDRAMINE HYDROCHLORIDE 50 MG/ML
12.5 INJECTION INTRAMUSCULAR; INTRAVENOUS
Status: DISCONTINUED | OUTPATIENT
Start: 2021-09-23 | End: 2021-09-23 | Stop reason: HOSPADM

## 2021-09-23 RX ORDER — OXYCODONE HCL 5 MG/5 ML
10 SOLUTION, ORAL ORAL
Status: DISCONTINUED | OUTPATIENT
Start: 2021-09-23 | End: 2021-09-23 | Stop reason: HOSPADM

## 2021-09-23 RX ORDER — OXYCODONE HYDROCHLORIDE 5 MG/1
5 TABLET ORAL EVERY 4 HOURS PRN
Qty: 30 TABLET | Refills: 0 | Status: SHIPPED | OUTPATIENT
Start: 2021-09-23 | End: 2021-10-01

## 2021-09-23 RX ORDER — CEFAZOLIN SODIUM 1 G/3ML
INJECTION, POWDER, FOR SOLUTION INTRAMUSCULAR; INTRAVENOUS PRN
Status: DISCONTINUED | OUTPATIENT
Start: 2021-09-23 | End: 2021-09-23 | Stop reason: SURG

## 2021-09-23 RX ORDER — LIDOCAINE HYDROCHLORIDE 20 MG/ML
INJECTION, SOLUTION EPIDURAL; INFILTRATION; INTRACAUDAL; PERINEURAL PRN
Status: DISCONTINUED | OUTPATIENT
Start: 2021-09-23 | End: 2021-09-23 | Stop reason: SURG

## 2021-09-23 RX ORDER — DEXAMETHASONE SODIUM PHOSPHATE 4 MG/ML
INJECTION, SOLUTION INTRA-ARTICULAR; INTRALESIONAL; INTRAMUSCULAR; INTRAVENOUS; SOFT TISSUE PRN
Status: DISCONTINUED | OUTPATIENT
Start: 2021-09-23 | End: 2021-09-23 | Stop reason: SURG

## 2021-09-23 RX ADMIN — ACETAMINOPHEN 1000 MG: 500 TABLET ORAL at 07:50

## 2021-09-23 RX ADMIN — SODIUM CHLORIDE, POTASSIUM CHLORIDE, SODIUM LACTATE AND CALCIUM CHLORIDE: 600; 310; 30; 20 INJECTION, SOLUTION INTRAVENOUS at 08:06

## 2021-09-23 RX ADMIN — PROPOFOL 200 MG: 10 INJECTION, EMULSION INTRAVENOUS at 08:09

## 2021-09-23 RX ADMIN — FENTANYL CITRATE 50 MCG: 50 INJECTION, SOLUTION INTRAMUSCULAR; INTRAVENOUS at 08:15

## 2021-09-23 RX ADMIN — LIDOCAINE HYDROCHLORIDE 100 MG: 20 INJECTION, SOLUTION EPIDURAL; INFILTRATION; INTRACAUDAL at 08:09

## 2021-09-23 RX ADMIN — DEXAMETHASONE SODIUM PHOSPHATE 4 MG: 4 INJECTION, SOLUTION INTRA-ARTICULAR; INTRALESIONAL; INTRAMUSCULAR; INTRAVENOUS; SOFT TISSUE at 08:12

## 2021-09-23 RX ADMIN — FENTANYL CITRATE 50 MCG: 50 INJECTION, SOLUTION INTRAMUSCULAR; INTRAVENOUS at 08:09

## 2021-09-23 RX ADMIN — MIDAZOLAM HYDROCHLORIDE 2 MG: 1 INJECTION, SOLUTION INTRAMUSCULAR; INTRAVENOUS at 08:06

## 2021-09-23 RX ADMIN — CELECOXIB 200 MG: 200 CAPSULE ORAL at 07:49

## 2021-09-23 RX ADMIN — ONDANSETRON 4 MG: 2 INJECTION INTRAMUSCULAR; INTRAVENOUS at 08:12

## 2021-09-23 RX ADMIN — CEFAZOLIN 2 G: 330 INJECTION, POWDER, FOR SOLUTION INTRAMUSCULAR; INTRAVENOUS at 08:11

## 2021-09-23 ASSESSMENT — PAIN SCALES - GENERAL: PAIN_LEVEL: 1

## 2021-09-23 ASSESSMENT — FIBROSIS 4 INDEX: FIB4 SCORE: 1.07

## 2021-09-23 ASSESSMENT — PAIN DESCRIPTION - PAIN TYPE
TYPE: SURGICAL PAIN

## 2021-09-23 NOTE — DISCHARGE INSTRUCTIONS
ACTIVITY: Rest and take it easy for the first 24 hours.  A responsible adult is recommended to remain with you during that time.  It is normal to feel sleepy.  We encourage you to not do anything that requires balance, judgment or coordination.    MILD FLU-LIKE SYMPTOMS ARE NORMAL. YOU MAY EXPERIENCE GENERALIZED MUSCLE ACHES, THROAT IRRITATION, HEADACHE AND/OR SOME NAUSEA.    FOR 24 HOURS DO NOT:  Drive, operate machinery or run household appliances.  Drink beer or alcoholic beverages.   Make important decisions or sign legal documents.    SPECIAL INSTRUCTIONS: See attached handout.     DIET: To avoid nausea, slowly advance diet as tolerated, avoiding spicy or greasy foods for the first day.  Add more substantial food to your diet according to your physician's instructions.  Babies can be fed formula or breast milk as soon as they are hungry.  INCREASE FLUIDS AND FIBER TO AVOID CONSTIPATION.    SURGICAL DRESSING/BATHING: OK     FOLLOW-UP APPOINTMENT:  A follow-up appointment should be arranged with your doctor; call to schedule.    You should CALL YOUR PHYSICIAN if you develop:  Fever greater than 101 degrees F.  Pain not relieved by medication, or persistent nausea or vomiting.  Excessive bleeding (blood soaking through dressing) or unexpected drainage from the wound.  Extreme redness or swelling around the incision site, drainage of pus or foul smelling drainage.  Inability to urinate or empty your bladder within 8 hours.  Problems with breathing or chest pain.    You should call 911 if you develop problems with breathing or chest pain.  If you are unable to contact your doctor or surgical center, you should go to the nearest emergency room or urgent care center.  Physician's telephone #: Dr Vines [phone]    If any questions arise, call your doctor.  If your doctor is not available, please feel free to call the Surgical Center at [phone]. The Contact Center is open Monday through Friday 7AM to 5PM  and may speak to a nurse at [phone], or toll free at [phone].     A registered nurse may call you a few days after your surgery to see how you are doing after your procedure.    MEDICATIONS: Resume taking daily medication.  Take prescribed pain medication with food.  If no medication is prescribed, you may take non-aspirin pain medication if needed.  PAIN MEDICATION CAN BE VERY CONSTIPATING.  Take a stool softener or laxative such as senokot, pericolace, or milk of magnesia if needed.    Prescription given for Oxycodone.  Last pain medication given at ________.    If your physician has prescribed pain medication that includes Acetaminophen (Tylenol), do not take additional Acetaminophen (Tylenol) while taking the prescribed medication.    Depression / Suicide Risk    As you are discharged from this [facility] facility, it is important to learn how to keep safe from harming yourself.    Recognize the warning signs:  · Abrupt changes in personality, positive or negative- including increase in energy   · Giving away possessions  · Change in eating patterns- significant weight changes-  positive or negative  · Change in sleeping patterns- unable to sleep or sleeping all the time   · Unwillingness or inability to communicate  · Depression  · Unusual sadness, discouragement and loneliness  · Talk of wanting to die  · Neglect of personal appearance   · Rebelliousness- reckless behavior  · Withdrawal from people/activities they love  · Confusion- inability to concentrate     If you or a loved one observes any of these behaviors or has concerns about self-harm, here's what you can do:  · Talk about it- your feelings and reasons for harming yourself  · Remove any means that you might use to hurt yourself (examples: pills, rope, extension cords, firearm)  · Get professional help from the community (Mental Health, Substance Abuse, psychological counseling)  · Do not be alone:Call your Safe Contact- someone whom  you trust who will be there for you.  · Call your local CRISIS HOTLINE 126-1157 or 059-881-9086  · Call your local Children's Mobile Crisis Response Team [place] (487) 462-1961 or www.Flynn  · Call the toll free National Suicide Prevention Hotlines   · National Suicide Prevention Lifeline 068-775-MPBD (6159)  · National [facility] Network 800-SUICIDE (307-9597)

## 2021-09-23 NOTE — LETTER
August 18, 2021    [facility]  [address] [place] [address]  [phone]    Patient Name: Mc Eugene  Surgeon Name: Surgeon(s):  Paul Vines M.D.  Surgery Facility: [facility] ([address] [place] [address])  Surgery Date: 9/23/2021    The time of your surgery is not final and may change up to and until the day of your surgery. You will be contacted 24-48 hours prior to your surgery date with your check-in and surgery time.    If you will not be at one of the below numbers please call/text the surgery scheduler at [facility] ([phone])  Cell Phone: [phone]    BEFORE YOUR SURGERY  Pre Registration and/or Lab Work must be done within and no earlier than 28 days prior to your surgery date. Please call [phone] for an appointment as soon as possible.    Please refrain from smoking any substance after midnight prior to surgery. Smoking may interfere with the anesthetic and frequently produces nausea during the recovery period.    Continue taking all lifesaving medications. Including the morning of your surgery with small sip of water.    Please read the MEDICATION INSTRUCTIONS below completely.    DAY OF YOUR SURGERY  Nothing to eat or drink after midnight     Please arrive at the hospital/surgery center at the check-in time provided.     An adult will need to bring you and take you home after your surgery.     AFTER YOUR SURGERY  Post op Appointment:   Date: 10/04/21   Time: 10 AM   With: DR VINES   Location: [address] [facility] [phone]    TIME OFF WORK  FMLA or Disability forms can be faxed directly to: [phone] or you may drop them off at [address] [facility] [phone]. Our office charges a $20.00 fee per form. Forms will be completed within 10 business days of drop off and payment received. For the status of your forms you may contact our disability office directly [phone].    MEDICATION INSTRUCTIONS  The following  medications should be stopped a minimum of 10 days prior to surgery:  All over the counter, Supplements & Herbal medications    Anorectics: Phentermine (Adipex-P, Lomaira and Suprenza), Phentermine-topiramate (Qsymia), Bupropion-naltrexone (Contrave)    Opiod Partial Agonists/Opioid Antagonists: Buprenorphine (Subocone, Belbuca, Butrans, Probuphine Implant, Sublocade), Naltrexone (ReVia, Vivitrol), Naloxone    Amphetamines: Dextroamphetamine/Amphetamine (Adderall, Mydayis), Methylphenidate Hydrochloride (Concerta, Metadate, Methylin, Ritalin)    The following medications should be stopped 5 days prior to surgery:  Blood Thinners: Any Aspirin, Aspirin products, anti-inflammatories such as ibuprofen and any blood thinners such as Coumadin and Plavix. Please consult your prescribing physician if you are on life saving blood thinners, in regards to when to stop medications prior to surgery.     The following medications should be stopped a minimum of 3 days prior to surgery:  PDE-5 inhibitors: Sildenafil (Viagra), Tadalafil (Cialis), Vardenafil (Levitra), Avanafil (Stendra)    MAO Inhibitors: Rasagiline (Azilect), Selegiline (Eldepryl, Emsam, Selapar), Isocarboxazid (Marplan), Phenelzine (Nardil)

## 2021-09-23 NOTE — ANESTHESIA POSTPROCEDURE EVALUATION
Patient: Mc Eugene    Procedure Summary     Date: 09/23/21 Room / Location: [facility] ROOM 21 / SURGERY SAME DAY [facility]    Anesthesia Start: 0806 Anesthesia Stop: 0941    Procedure: FASCIECTOMY - PALM, MIDDLE AND RING FINGER (Left Hand) Diagnosis:       Dupuytren contracture      (Dupuytren contracture)    Surgeons: Paul Vines M.D. Responsible Provider: Renata Saucedo M.D.    Anesthesia Type: general ASA Status: 2          Final Anesthesia Type: general  Last vitals  BP   Blood Pressure: (!) 166/78    Temp   36.3 °C (97.4 °F)    Pulse   93   Resp   17    SpO2   95 %      Anesthesia Post Evaluation    Patient location during evaluation: PACU  Patient participation: complete - patient participated  Level of consciousness: awake and alert  Pain score: 1    Airway patency: patent  Anesthetic complications: no  Cardiovascular status: hemodynamically stable  Respiratory status: acceptable  Hydration status: euvolemic    PONV: none          No complications documented.     Nurse Pain Score: 2 (NPRS)

## 2021-09-23 NOTE — OP REPORT
OPERATIVE NOTE     DATE OF PROCEDURE: 9/23/2021            PRE-OP DIAGNOSIS: Left hand Dupuytren's contracture            POST-OP DIAGNOSIS: same            PROCEDURE: Left palm, middle, ring finger Dupuytren's fasciectomy            SURGEON: Paul Vines M.D. - Primary            ANESTHESIA: General            ESTIMATED BLOOD LOSS: Minimal                   SPECIMENS: 1 specimen sent to pathology            COMPLICATIONS: none            CONDITION: stable to PACU            OPERATIVE INDICATIONS AND DESCRIPTION OF PROCEDURE: This is a pleasant 56-year-old gentleman who presented to my office with Dupuytren's contracture of the left hand.  They had extensive Dupuytren's tissue in the palm extending into the middle and ring finger.  They had an MCP contracture of about 75 degrees PIP contracture of about 30 on both of those digits. We discussed treatment options in the office including conservative management, collagenase, fasciotomy, fasciectomy.  We discussed risks and benefits of all treatment methods.  They elected to proceed with fasciectomy.  We discussed risks including, but not limited to, bleeding, infection, wound healing issues, hematoma, neurovascular injury, recurrence, incomplete correction of contracture, expectations regarding postoperative recovery, risks of anesthesia.  We specifically discussed long-term correction of his contracture and recurrence rates.  They elected to proceed.  I saw the patient in the preoperative holding area, identified them, and marked the hand.  They were taken back to the operating room.  General anesthesia was induced by the anesthesia provider.  A tourniquet was placed on the forearm and the upper extremity was prepped and draped in usual orthopedic fashion.  A timeout was performed.  The tourniquet was inflated to 250 mmHg.  I examined the Dupuytren's nodules and cords and planned a Manfred incision over the palm extending into the ring and middle finger.  I  carefully elevated skin flaps.  I identified the Dupuytren's contracture.  There was a large central cord in the palm extending into a spiral cord into the middle and ring finger.  I started proximally, dissected around the cord, and identified the common digital nerves.  I used a 15 blade to transect the most proximal portion of the cord.  I then carefully dissected along the cord as I went distally, identifying and protecting the digital nerve branches.  I began on the middle finger.  I carefully elevated the Dupuytren's tissue.  As the cord across the MP joint, there was a large spiral cord.  The radial digital nerve spiraled centrally over the Dupuytren's tissue.  I carefully dissected the radial digital nerve and protected it.  I excised all of the Dupuytren's tissue in its entirety.  At this point I went to the ring finger and dissected distally, carefully protecting the digital nerves.  There was no spiraling of the digital nerves on this digit.  I removed all of the Dupuytren's tissue on this finger.  He did sustain 1 small skin perforation on the middle finger. I once again examined all of the digital nerves along the entire course of the wound and they were completely intact and uninjured.  I obtained hemostasis with bipolar cautery.  The wound was thoroughly irrigated with normal saline.  The skin was closed with 4-0 nylon.  Due to the severity of the contracture, I did have to leave a 2 x 3 cm section of his palm open in order to avoid any excess tension on the wound. 2 Penrose drains were placed in the wound to help drain any hematoma formation.  A sterile dressing and a splint were applied.  Tourniquet was deflated.  He woke from anesthesia and was transferred to the PACU in stable condition.

## 2021-09-23 NOTE — LETTER
August 18, 2021    [facility]  [address] [place] [address]  [phone]    Patient Name: Mc Eugene  Surgeon Name: Surgeon(s):  Paul Vines M.D.  Surgery Facility: [facility] ([address] [place] [address])  Surgery Date: 9/23/2021    The time of your surgery is not final and may change up to and until the day of your surgery. You will be contacted 24-48 hours prior to your surgery date with your check-in and surgery time.    If you will not be at one of the below numbers please call/text the surgery scheduler at [facility] ([phone])  Cell Phone: [phone]    BEFORE YOUR SURGERY  Pre Registration and/or Lab Work must be done within and no earlier than 28 days prior to your surgery date. Please call [phone] for an appointment as soon as possible.    Please refrain from smoking any substance after midnight prior to surgery. Smoking may interfere with the anesthetic and frequently produces nausea during the recovery period.    Continue taking all lifesaving medications. Including the morning of your surgery with small sip of water.    Please read the MEDICATION INSTRUCTIONS below completely.    DAY OF YOUR SURGERY  Nothing to eat or drink after midnight     Please arrive at the hospital/surgery center at the check-in time provided.     An adult will need to bring you and take you home after your surgery.     AFTER YOUR SURGERY  Post op Appointment:   Date: 10/04/21   Time: 10am   With: DR VINES   Location: [address] [facility] [phone]    TIME OFF WORK  FMLA or Disability forms can be faxed directly to: [phone] or you may drop them off at [address] [facility] [phone]. Our office charges a $20.00 fee per form. Forms will be completed within 10 business days of drop off and payment received. For the status of your forms you may contact our disability office directly [phone].    MEDICATION INSTRUCTIONS  The following  medications should be stopped a minimum of 10 days prior to surgery:  All over the counter, Supplements & Herbal medications    Anorectics: Phentermine (Adipex-P, Lomaira and Suprenza), Phentermine-topiramate (Qsymia), Bupropion-naltrexone (Contrave)    Opiod Partial Agonists/Opioid Antagonists: Buprenorphine (Subocone, Belbuca, Butrans, Probuphine Implant, Sublocade), Naltrexone (ReVia, Vivitrol), Naloxone    Amphetamines: Dextroamphetamine/Amphetamine (Adderall, Mydayis), Methylphenidate Hydrochloride (Concerta, Metadate, Methylin, Ritalin)    The following medications should be stopped 5 days prior to surgery:  Blood Thinners: Any Aspirin, Aspirin products, anti-inflammatories such as ibuprofen and any blood thinners such as Coumadin and Plavix. Please consult your prescribing physician if you are on life saving blood thinners, in regards to when to stop medications prior to surgery.     The following medications should be stopped a minimum of 3 days prior to surgery:  PDE-5 inhibitors: Sildenafil (Viagra), Tadalafil (Cialis), Vardenafil (Levitra), Avanafil (Stendra)    MAO Inhibitors: Rasagiline (Azilect), Selegiline (Eldepryl, Emsam, Selapar), Isocarboxazid (Marplan), Phenelzine (Nardil)

## 2021-09-23 NOTE — ANESTHESIA PROCEDURE NOTES
Airway    Date/Time: 9/23/2021 8:09 AM  Performed by: Renata Saucedo M.D.  Authorized by: Renata Saucedo M.D.     Location:  OR  Urgency:  Elective  Difficult Airway: No    Indications for Airway Management:  Anesthesia      Spontaneous Ventilation: absent    Sedation Level:  Deep  Preoxygenated: Yes    Mask Difficulty Assessment:  1 - vent by mask  Final Airway Type:  Supraglottic airway  Final Supraglottic Airway:  Standard LMA    SGA Size:  4  Number of Attempts at Approach:  1  Number of Other Approaches Attempted:  0

## 2021-09-23 NOTE — DISCHARGE INSTR - OTHER INFO
DR. VINES'S POST-OPERATIVE INSTRUCTIONS    You have just undergone a sugery by Dr. Vines in the operating room.  It is our wish that your postoperative recovery be as quick and comfortable as possible.  Please carefully review the following items that are important for your recovery.    After any operation, a certain degree of pain is to be expected. Take Advil (ibuprofen) and Extra Strength Tylenol as first line medications for mild to moderate pain. Taking each one every 6 hours, and staggering them so that you are taking one medicine every 3 hours, is the most effective. Refer to dosing instructions on the bottle, but in general ibuprofen dose is 600-800mg and Tylenol dose is 500-1000mg. For most small procedures, this should be enough to keep you comfortable. Take these medications until your followup visit. You may have been given a small prescription for stronger pain medicine which will help relieve more severe pain.  Pain medicine may make you drowsy so please keep this in mind.  Do not drive while taking pain medicine.      When you go home, please keep your operated arm elevated at all times (above the level of your heart).  If you do this, your swelling will resolve more quickly and your pain will be improve more quickly as well. You may also place an ice pack over your dressing or splint to help with swelling and pain.    It is also very important to keep your fingers moving after most procedures, unless you had a tendon repair or fracture repair in which case you will be in a splint. Keep all of your fingers moving through a full range of motion to prevent stiffness.    For small hand procedures such as carpal tunnel release, trigger finger release, and cyst excision, the dressing that you have on your extremity should remain on for 3-4 days. It may then be removed, you can wash the incision  gently with soap and water, and keep the incision covered with a band-aid or similar clean dressing. For larger procedures or if you have a splint on, these should remain on until follow up or as specifically instructed. If you feel that your dressing is too tight during the first 3 days after surgery, you may loosen it. It is normal to see minor staining on the dressing after surgery. If there is significant bleeding, you are advised to call the office during regular office hours to have this checked.  Make sure that your dressing is kept dry at all times.  You can take a shower if you cover your arm with a plastic bag. If your dressing gets wet, replace it with sterile dressing that you can obtain from your local drug store.    Please call our office for a follow-up appointment, [phone]. Follow up after surgery is typically 10-14 days, unless you were specifically instructed otherwise. The sutures will be removed and you may be asked to see a hand therapist to optimize your functional result. Each of the hand therapists that you will be referred to have received special training in the care of the hand and upper extremity.    If you have questions regarding your surgery postop that you feel requires attention, please call the office at [phone] during business hours, or [phone] after hours for the answering service. If you feel that you have a surgical emergency postoperatively that requires immediate attention, please call the above numbers or go to the Emergency Department and ask for the Orthopedic Surgeon on call.

## 2021-09-23 NOTE — OR NURSING
0940 Patient arrived to PACU from OR. Report received. Patient attached to monitoring. VSS. Patient oxygenating well on 6 L via mask. Dressing on left arm, CDI, no drainage noted.     1000 Patient awake and stating 2/10 pain in left arm/nand. Patient declines medication at this time.No nausea reported.     1015 Patient meets phase two criteria.     1023 Rn went over discharge instructions with patients wife. Kate over the phone.      1028 Patient getting dressed and ready for discharge.     1045 IV removed. Patient discharged via wheelchair by CNA to patients wife.

## 2021-09-23 NOTE — ANESTHESIA TIME REPORT
Anesthesia Start and Stop Event Times     Date Time Event    9/23/2021 0757 Ready for Procedure     0806 Anesthesia Start     0941 Anesthesia Stop        Responsible Staff  09/23/21    Name Role Begin End    Renata Saucedo M.D. Anesth 0806 0941        Preop Diagnosis (Free Text):  Pre-op Diagnosis     Dupuytren contracture        Preop Diagnosis (Codes):    Post op Diagnosis  Dupuytren contracture      Premium Reason  Non-Premium    Comments:

## 2021-09-23 NOTE — ANESTHESIA PREPROCEDURE EVALUATION
55yo male with HTN, NIDDM, daily marijuana use for fasciectomy L hand    Relevant Problems   CARDIAC   (positive) Essential hypertension      ENDO   (positive) Type 2 diabetes mellitus with hyperglycemia, with long-term current use of insulin (HCC)       Physical Exam    Airway   Mallampati: I  TM distance: >3 FB  Neck ROM: full       Cardiovascular - normal exam  Rhythm: regular  Rate: normal  (-) murmur     Dental - normal exam           Pulmonary - normal exam  Breath sounds clear to auscultation     Abdominal    Neurological - normal exam                 Anesthesia Plan    ASA 2       Plan - general       Airway plan will be LMA          Induction: intravenous    Postoperative Plan: Postoperative administration of opioids is intended.    Pertinent diagnostic labs and testing reviewed    Informed Consent:    Anesthetic plan and risks discussed with patient.    Use of blood products discussed with: patient whom consented to blood products.

## 2021-09-23 NOTE — H&P
Referring Provider: Thiago Kauffman D.*  PCP: Thiago Kauffman D.O.  Reason for visit:       Chief Complaint   Patient presents with   • Left Hand - Dupuyten's Contraction               History: Mc Eugene is a pleasant 55 y.o. male coming in today for evaluation of difficulty extending their digits.  The symptoms have been present for a long time.  His left hand is mostly symptomatic as he is a  and is having hard time using the computer.  Symptoms began without any specific trauma or incident, and have been progressively worsening.  They bother him functionally.  Denies any numbness or tingling. Denies pain.        PMH:        Past Medical History:   Diagnosis Date   • Diabetes (HCC)     • Kidney anomaly, congenital       born with 1 kidney         PSH:         Past Surgical History:   Procedure Laterality Date   • TOE AMPUTATION Right 9/9/2020     Procedure: AMPUTATION, TOE - 5th;  Surgeon: Liban Mallory M.D.;  Location: [facility];  Service: Orthopedics   • INCISION AND DRAINAGE GENERAL Right 9/9/2020     Procedure: INCISION AND DRAINAGE - POSSIBLE METATARSAL RESECTION;  Surgeon: Liban Mallory M.D.;  Location: [facility];  Service: Orthopedics   • TOE AMPUTATION Right 8/6/2020     Procedure: AMPUTATION, TOE - 5th TOE;  Surgeon: Liban Mallory M.D.;  Location: [facility];  Service: Orthopedics   • APPENDECTOMY             Tobacco history:   Social History          Tobacco Use   Smoking Status Former Smoker   Smokeless Tobacco Never Used         Medications:   Current Outpatient Medications:   •  atorvastatin (LIPITOR) 10 MG Tab, , Disp: , Rfl:   •  lisinopril (PRINIVIL) 5 MG Tab, TAKE ONE TABLET BY MOUTH DAILY, Disp: 90 tablet, Rfl: 3  •  Blood Glucose Test Strips, Test strips order: Test strips for One Touch Verio meter. Sig: use tid and prn ssx high or low sugar #100 RF x 11, Disp: 100 Each, Rfl: 11  •  NON SPECIFIED, You  are low on Vitamin D please start 2,000 IU over the counter each day, Nature Made is a good brand!, Disp: 1 Each, Rfl: 0     Allergies: Bactrim ds     Exam:  General: No acute distress, alert and oriented  Upper extremity: Bilateral hands are atraumatic, skin intact throughout.  Compartments soft and compressible. All tendons intact. Brisk capillary refill to all fingers.  He has pretty significant Dupuytren's contracture.  He has a large central cords in the palm extending mostly into the middle and ring fingers with spiral cords.  He has about 80 to 90 degree MCP flexion contracture, and also a little bit of PIP flexion contracture which is mild.     Imaging: please see separate imaging dictation.     Assessment/Plan: 55 y.o. male with Dupuytren's contracture.  We had a discussion about the diagnosis, relevant anatomy, etiology, as well as treatment options.  He has pretty significant contracture.  We have a long discussion regarding options including Xiaflex and fasciectomy and different types of limited versus complete fasciectomy.  They elected to proceed with left palm, middle and ring finger fasciectomy.              Paul Vines M.D.

## 2021-11-15 ENCOUNTER — TELEPHONE (OUTPATIENT)
Dept: MEDICAL GROUP | Facility: LAB | Age: 56
End: 2021-11-15

## 2021-11-15 DIAGNOSIS — Z79.4 TYPE 2 DIABETES MELLITUS WITH HYPERGLYCEMIA, WITH LONG-TERM CURRENT USE OF INSULIN (HCC): ICD-10-CM

## 2021-11-15 DIAGNOSIS — E11.65 TYPE 2 DIABETES MELLITUS WITH HYPERGLYCEMIA, WITH LONG-TERM CURRENT USE OF INSULIN (HCC): ICD-10-CM

## 2021-11-15 DIAGNOSIS — E55.9 VITAMIN D DEFICIENCY: ICD-10-CM

## 2021-11-15 NOTE — TELEPHONE ENCOUNTER
1. Caller Name: Mc                         Call Back Number: [phone] (home)        How would the patient prefer to be contacted with a response: Phone call OK to leave a detailed message    Pt has an appt 11/17.  He is asking if you'd like some labs done, HbA1C and Vit D??

## 2021-11-16 ENCOUNTER — HOSPITAL ENCOUNTER (OUTPATIENT)
Dept: LAB | Facility: MEDICAL CENTER | Age: 56
End: 2021-11-16
Attending: INTERNAL MEDICINE
Payer: COMMERCIAL

## 2021-11-16 DIAGNOSIS — Z79.4 TYPE 2 DIABETES MELLITUS WITH HYPERGLYCEMIA, WITH LONG-TERM CURRENT USE OF INSULIN (HCC): ICD-10-CM

## 2021-11-16 DIAGNOSIS — E11.65 TYPE 2 DIABETES MELLITUS WITH HYPERGLYCEMIA, WITH LONG-TERM CURRENT USE OF INSULIN (HCC): ICD-10-CM

## 2021-11-16 DIAGNOSIS — E55.9 VITAMIN D DEFICIENCY: ICD-10-CM

## 2021-11-16 LAB
EST. AVERAGE GLUCOSE BLD GHB EST-MCNC: 134 MG/DL
HBA1C MFR BLD: 6.3 % (ref 4–5.6)

## 2021-11-16 PROCEDURE — 82306 VITAMIN D 25 HYDROXY: CPT

## 2021-11-16 PROCEDURE — 36415 COLL VENOUS BLD VENIPUNCTURE: CPT

## 2021-11-16 PROCEDURE — 83036 HEMOGLOBIN GLYCOSYLATED A1C: CPT

## 2021-11-19 ENCOUNTER — OFFICE VISIT (OUTPATIENT)
Dept: MEDICAL GROUP | Facility: LAB | Age: 56
End: 2021-11-19
Payer: COMMERCIAL

## 2021-11-19 VITALS
SYSTOLIC BLOOD PRESSURE: 130 MMHG | WEIGHT: 208 LBS | OXYGEN SATURATION: 97 % | HEIGHT: 67 IN | HEART RATE: 96 BPM | DIASTOLIC BLOOD PRESSURE: 86 MMHG | BODY MASS INDEX: 32.65 KG/M2 | TEMPERATURE: 97 F

## 2021-11-19 DIAGNOSIS — Z79.4 TYPE 2 DIABETES MELLITUS WITH HYPERGLYCEMIA, WITH LONG-TERM CURRENT USE OF INSULIN (HCC): ICD-10-CM

## 2021-11-19 DIAGNOSIS — Z91.89 OTHER SPECIFIED PERSONAL RISK FACTORS, NOT ELSEWHERE CLASSIFIED: ICD-10-CM

## 2021-11-19 DIAGNOSIS — Z12.11 SCREEN FOR COLON CANCER: ICD-10-CM

## 2021-11-19 DIAGNOSIS — E11.65 TYPE 2 DIABETES MELLITUS WITH HYPERGLYCEMIA, WITH LONG-TERM CURRENT USE OF INSULIN (HCC): ICD-10-CM

## 2021-11-19 DIAGNOSIS — E66.09 CLASS 1 OBESITY DUE TO EXCESS CALORIES WITHOUT SERIOUS COMORBIDITY WITH BODY MASS INDEX (BMI) OF 33.0 TO 33.9 IN ADULT: ICD-10-CM

## 2021-11-19 LAB — 25(OH)D3 SERPL-MCNC: 28 NG/ML (ref 30–80)

## 2021-11-19 PROCEDURE — 99214 OFFICE O/P EST MOD 30 MIN: CPT | Performed by: INTERNAL MEDICINE

## 2021-11-19 ASSESSMENT — FIBROSIS 4 INDEX: FIB4 SCORE: 1.07

## 2021-11-19 NOTE — PROGRESS NOTES
CC: Mc Eugene is a 56 y.o. male is suffering from   Chief Complaint   Patient presents with   • Lab Results     4 month f/u visit         SUBJECTIVE:  1. Other specified personal risk factors, not elsewhere classified  Cristopher is here for follow-up, we have discussed his need for CT cardiac scoring, patient is a type II diabetic.  Is on Lipitor    2. Type 2 diabetes mellitus with hyperglycemia, with long-term current use of insulin ([facility])  History of type 2 diabetes with adequate control no change in medical therapy    3. Screen for colon cancer  Referral written for screening for colon cancer    4. Class 1 drug-induced obesity without serious comorbidity with body mass index (BMI) of 32.0 to 32.9 in adult  Patient with a history of diabetes no change in medical therapy        Past social, family, history: ,  at [facility]  Social History     Tobacco Use   • Smoking status: Former Smoker     Packs/day: 0.50     Years: 10.00     Pack years: 5.00     Types: Cigarettes     Quit date: 1996     Years since quittin.9   • Smokeless tobacco: Never Used   Vaping Use   • Vaping Use: Every day   Substance Use Topics   • Alcohol use: Yes     Comment: once per month    • Drug use: Yes     Types: Inhaled     Comment: marijuana daily          MEDICATIONS:    Current Outpatient Medications:   •  ONETOUCH VERIO strip, , Disp: , Rfl:   •  vitamin D (CHOLECALCIFEROL) 1000 Unit (25 mcg) Tab, Take 2,000 Units by mouth 2 times a day., Disp: , Rfl:   •  Multiple Vitamin (MULTIVITAMIN ADULT PO), Take 1 Tablet by mouth 2 times a day., Disp: , Rfl:   •  Ascorbic Acid (VITAMIN C) 1000 MG Tab, Take 1 Tablet by mouth every morning., Disp: , Rfl:   •  Zinc Sulfate (ZINC 15 PO), Take 1 Tablet by mouth every morning., Disp: , Rfl:   •  atorvastatin (LIPITOR) 10 MG Tab, Take 10 mg by mouth every morning., Disp: , Rfl:   •  lisinopril (PRINIVIL) 5 MG Tab, TAKE ONE TABLET BY MOUTH DAILY (Patient taking  "differently: Take 5 mg by mouth every morning.), Disp: 90 tablet, Rfl: 3  •  Blood Glucose Test Strips, Test strips order: Test strips for One Touch Verio meter. Sig: use tid and prn ssx high or low sugar #100 RF x 11, Disp: 100 Each, Rfl: 11    PROBLEMS:  Patient Active Problem List    Diagnosis Date Noted   • Dupuytren contracture 08/11/2021   • Leukocytosis 09/08/2020   • Essential hypertension 08/03/2020   • Type 2 diabetes mellitus with hyperglycemia, with long-term current use of insulin (HCC) 08/01/2020   • Diabetic foot infection (HCC) 08/01/2020   • Class 1 obesity due to excess calories without serious comorbidity with body mass index (BMI) of 33.0 to 33.9 in adult 08/01/2020       REVIEW OF SYSTEMS:  Gen.:  No Nausea, Vomiting, fever, Chills.  Heart: No chest pain.  Lungs:  No shortness of Breath.  Psychological: Vincenzo unusual Anxiety depression     PHYSICAL EXAM   Constitutional: Alert, cooperative, not in acute distress.  Cardiovascular:  Rate Rhythm is regular without murmurs rubs clicks.     Thorax & Lungs: Clear to auscultation, no wheezing, rhonchi, or rales  HENT: Normocephalic, Atraumatic.  Eyes: PERRLA, EOMI, Conjunctiva normal.   Neck: Trachia is midline no swelling of the thyroid.   Lymphatic: No lymphadenopathy noted.   Neurologic: Alert & oriented x 3, cranial nerves II through XII are intact, Normal motor function, Normal sensory function, No focal deficits noted.   Psychiatric: Affect normal, Judgment normal, Mood normal.     VITAL SIGNS:/86 (BP Location: Left arm, Patient Position: Sitting, BP Cuff Size: Adult)   Pulse 96   Temp 36.1 °C (97 °F) (Temporal)   Ht 1.702 m (5' 7\")   Wt 94.3 kg (208 lb)   SpO2 97%   BMI 32.58 kg/m²     Labs: Reviewed    Assessment:                                                     Plan:    1. Other specified personal risk factors, not elsewhere classified  CT cardiac scoring ordered  - CT-CARDIAC SCORING; Future  - VITAMIN D,25 HYDROXY; Future  - " HEMOGLOBIN A1C; Future    2. Type 2 diabetes mellitus with hyperglycemia, with long-term current use of insulin (HCC)  Recheck hemoglobin A1c  - VITAMIN D,25 HYDROXY; Future  - HEMOGLOBIN A1C; Future    3. Screen for colon cancer  Referral written for screening colonoscopy  - Referral to GI for Colonoscopy  - Patient identified as having weight management issue.  Appropriate orders and counseling given.    4. Class 1 obesity without serious comorbidity with body mass index (BMI) of 32.0 to 32.9 in adult  History of obesity class I, encourage patient work on diet exercise

## 2022-02-14 RX ORDER — BLOOD SUGAR DIAGNOSTIC
STRIP MISCELLANEOUS
Qty: 100 STRIP | Refills: 3 | Status: SHIPPED | OUTPATIENT
Start: 2022-02-14 | End: 2022-11-22 | Stop reason: SDUPTHER

## 2022-04-13 ENCOUNTER — HOSPITAL ENCOUNTER (OUTPATIENT)
Dept: LAB | Facility: MEDICAL CENTER | Age: 57
End: 2022-04-13
Attending: INTERNAL MEDICINE
Payer: COMMERCIAL

## 2022-04-13 DIAGNOSIS — N18.9 CHRONIC KIDNEY DISEASE, UNSPECIFIED CKD STAGE: ICD-10-CM

## 2022-04-13 DIAGNOSIS — R80.9 PROTEINURIA, UNSPECIFIED TYPE: ICD-10-CM

## 2022-04-13 DIAGNOSIS — E11.65 TYPE 2 DIABETES MELLITUS WITH HYPERGLYCEMIA, WITH LONG-TERM CURRENT USE OF INSULIN (HCC): ICD-10-CM

## 2022-04-13 DIAGNOSIS — Z91.89 OTHER SPECIFIED PERSONAL RISK FACTORS, NOT ELSEWHERE CLASSIFIED: ICD-10-CM

## 2022-04-13 DIAGNOSIS — Z79.4 TYPE 2 DIABETES MELLITUS WITH HYPERGLYCEMIA, WITH LONG-TERM CURRENT USE OF INSULIN (HCC): ICD-10-CM

## 2022-04-13 LAB
ANION GAP SERPL CALC-SCNC: 11 MMOL/L (ref 7–16)
BUN SERPL-MCNC: 29 MG/DL (ref 8–22)
CALCIUM SERPL-MCNC: 9 MG/DL (ref 8.4–10.2)
CHLORIDE SERPL-SCNC: 105 MMOL/L (ref 96–112)
CO2 SERPL-SCNC: 22 MMOL/L (ref 20–33)
CREAT SERPL-MCNC: 1.13 MG/DL (ref 0.5–1.4)
EST. AVERAGE GLUCOSE BLD GHB EST-MCNC: 154 MG/DL
FASTING STATUS PATIENT QL REPORTED: NORMAL
GFR SERPLBLD CREATININE-BSD FMLA CKD-EPI: 76 ML/MIN/1.73 M 2
GLUCOSE SERPL-MCNC: 110 MG/DL (ref 65–99)
HBA1C MFR BLD: 7 % (ref 4–5.6)
POTASSIUM SERPL-SCNC: 5 MMOL/L (ref 3.6–5.5)
SODIUM SERPL-SCNC: 138 MMOL/L (ref 135–145)

## 2022-04-13 PROCEDURE — 80048 BASIC METABOLIC PNL TOTAL CA: CPT

## 2022-04-13 PROCEDURE — 36415 COLL VENOUS BLD VENIPUNCTURE: CPT

## 2022-04-13 PROCEDURE — 83036 HEMOGLOBIN GLYCOSYLATED A1C: CPT

## 2022-04-13 PROCEDURE — 82306 VITAMIN D 25 HYDROXY: CPT

## 2022-04-14 LAB — 25(OH)D3 SERPL-MCNC: 43 NG/ML (ref 30–100)

## 2022-04-29 RX ORDER — ATORVASTATIN CALCIUM 10 MG/1
10 TABLET, FILM COATED ORAL DAILY
Qty: 90 TABLET | Refills: 3 | Status: SHIPPED | OUTPATIENT
Start: 2022-04-29 | End: 2022-11-22 | Stop reason: SDUPTHER

## 2022-06-13 DIAGNOSIS — Z79.4 TYPE 2 DIABETES MELLITUS WITH HYPERGLYCEMIA, WITH LONG-TERM CURRENT USE OF INSULIN (HCC): ICD-10-CM

## 2022-06-13 DIAGNOSIS — E11.65 TYPE 2 DIABETES MELLITUS WITH HYPERGLYCEMIA, WITH LONG-TERM CURRENT USE OF INSULIN (HCC): ICD-10-CM

## 2022-06-13 RX ORDER — LISINOPRIL 5 MG/1
TABLET ORAL
Qty: 90 TABLET | Refills: 3 | Status: SHIPPED
Start: 2022-06-13 | End: 2022-11-17

## 2022-06-14 ENCOUNTER — OFFICE VISIT (OUTPATIENT)
Dept: NEPHROLOGY | Facility: MEDICAL CENTER | Age: 57
End: 2022-06-14
Payer: COMMERCIAL

## 2022-06-14 VITALS
WEIGHT: 211.6 LBS | TEMPERATURE: 97.5 F | DIASTOLIC BLOOD PRESSURE: 80 MMHG | HEIGHT: 68 IN | OXYGEN SATURATION: 97 % | SYSTOLIC BLOOD PRESSURE: 128 MMHG | HEART RATE: 119 BPM | BODY MASS INDEX: 32.07 KG/M2

## 2022-06-14 DIAGNOSIS — N18.9 CHRONIC KIDNEY DISEASE, UNSPECIFIED CKD STAGE: ICD-10-CM

## 2022-06-14 DIAGNOSIS — E11.65 TYPE 2 DIABETES MELLITUS WITH HYPERGLYCEMIA, WITH LONG-TERM CURRENT USE OF INSULIN (HCC): ICD-10-CM

## 2022-06-14 DIAGNOSIS — R80.9 PROTEINURIA, UNSPECIFIED TYPE: ICD-10-CM

## 2022-06-14 DIAGNOSIS — Z79.4 TYPE 2 DIABETES MELLITUS WITH HYPERGLYCEMIA, WITH LONG-TERM CURRENT USE OF INSULIN (HCC): ICD-10-CM

## 2022-06-14 PROCEDURE — 99214 OFFICE O/P EST MOD 30 MIN: CPT | Performed by: INTERNAL MEDICINE

## 2022-06-14 ASSESSMENT — ENCOUNTER SYMPTOMS
VOMITING: 0
NAUSEA: 0
SHORTNESS OF BREATH: 0
FEVER: 0
CHILLS: 0
COUGH: 0

## 2022-06-14 ASSESSMENT — FIBROSIS 4 INDEX: FIB4 SCORE: 1.07

## 2022-06-15 NOTE — PROGRESS NOTES
"Subjective     Mc Eugene is a 56 y.o. male who presents with Chronic Kidney Disease            Patient has a long history of diabetes mellitus, recent hemoglobin A1c of 7    Chronic Kidney Disease  This is a chronic problem. The current episode started more than 1 year ago. The problem occurs constantly. The problem has been unchanged. Pertinent negatives include no chest pain, chills, coughing, fever, nausea, urinary symptoms or vomiting.       Review of Systems   Constitutional: Negative for chills, fever and malaise/fatigue.   Respiratory: Negative for cough and shortness of breath.    Cardiovascular: Negative for chest pain and leg swelling.   Gastrointestinal: Negative for nausea and vomiting.   Genitourinary: Negative for dysuria, frequency and urgency.              Objective     /80 (BP Location: Right arm, Patient Position: Sitting)   Pulse (!) 119   Temp 36.4 °C (97.5 °F)   Ht 1.727 m (5' 8\")   Wt 96 kg (211 lb 9.6 oz)   SpO2 97%   BMI 32.17 kg/m²      Physical Exam  Vitals and nursing note reviewed.   Constitutional:       General: He is not in acute distress.     Appearance: He is not ill-appearing.   HENT:      Head: Normocephalic and atraumatic.      Right Ear: External ear normal.      Left Ear: External ear normal.      Nose: Nose normal.   Eyes:      General:         Right eye: No discharge.         Left eye: No discharge.      Conjunctiva/sclera: Conjunctivae normal.   Cardiovascular:      Rate and Rhythm: Normal rate and regular rhythm.      Heart sounds: No murmur heard.  Pulmonary:      Effort: Pulmonary effort is normal. No respiratory distress.      Breath sounds: Normal breath sounds. No wheezing.   Musculoskeletal:         General: No tenderness or deformity.      Right lower leg: No edema.      Left lower leg: No edema.   Skin:     General: Skin is warm.   Neurological:      General: No focal deficit present.      Mental Status: He is alert and oriented to person, place, " and time.   Psychiatric:         Mood and Affect: Mood normal.         Behavior: Behavior normal.       Past Medical History:   Diagnosis Date   • Cataract     right IOL    • Dental disorder     partial upper    • Diabetes (HCC)     diet controlled    • High cholesterol    • Hypertension    • Kidney anomaly, congenital     born with 1 kidney   • Sleep apnea     hx of, no cpap since losing weight    • Snoring      Social History     Socioeconomic History   • Marital status:      Spouse name: Not on file   • Number of children: Not on file   • Years of education: Not on file   • Highest education level: Not on file   Occupational History   • Not on file   Tobacco Use   • Smoking status: Former Smoker     Packs/day: 0.50     Years: 10.00     Pack years: 5.00     Types: Cigarettes     Quit date: 1996     Years since quittin.4   • Smokeless tobacco: Never Used   Vaping Use   • Vaping Use: Every day   Substance and Sexual Activity   • Alcohol use: Yes     Comment: once per month    • Drug use: Yes     Types: Inhaled     Comment: marijuana daily    • Sexual activity: Not on file   Other Topics Concern   • Not on file   Social History Narrative   • Not on file     Social Determinants of Health     Financial Resource Strain: Not on file   Food Insecurity: Not on file   Transportation Needs: Not on file   Physical Activity: Not on file   Stress: Not on file   Social Connections: Not on file   Intimate Partner Violence: Not on file   Housing Stability: Not on file     Family History   Problem Relation Age of Onset   • Lung Disease Mother    • Cancer Mother      Recent Labs     21  0809 22  1203   SODIUM 136 138   POTASSIUM 5.2 5.0   CHLORIDE 102 105   CO2 23 22   BUN 19 29*   CREATININE 1.10 1.13                             Assessment & Plan        1. Chronic kidney disease, unspecified CKD stage  Stable  No uremic symptoms  Renal dose of medication  Avoid nephrotoxins  Continue same medication  regimen      2. Proteinuria, unspecified type  Continue ACE inhibitor    3. Type 2 diabetes mellitus with hyperglycemia, with long-term current use of insulin (HCC)  Continue to optimize diabetes control   recommend to start SGLT2 inhibitor

## 2022-11-10 ENCOUNTER — HOSPITAL ENCOUNTER (OUTPATIENT)
Dept: LAB | Facility: MEDICAL CENTER | Age: 57
End: 2022-11-10
Attending: FAMILY MEDICINE
Payer: COMMERCIAL

## 2022-11-10 ENCOUNTER — OFFICE VISIT (OUTPATIENT)
Dept: MEDICAL GROUP | Facility: LAB | Age: 57
End: 2022-11-10
Payer: COMMERCIAL

## 2022-11-10 VITALS
SYSTOLIC BLOOD PRESSURE: 130 MMHG | DIASTOLIC BLOOD PRESSURE: 80 MMHG | HEIGHT: 67 IN | OXYGEN SATURATION: 94 % | BODY MASS INDEX: 33.43 KG/M2 | HEART RATE: 102 BPM | TEMPERATURE: 97.2 F | WEIGHT: 213 LBS

## 2022-11-10 DIAGNOSIS — E11.65 TYPE 2 DIABETES MELLITUS WITH HYPERGLYCEMIA, WITH LONG-TERM CURRENT USE OF INSULIN (HCC): ICD-10-CM

## 2022-11-10 DIAGNOSIS — Z79.4 TYPE 2 DIABETES MELLITUS WITH HYPERGLYCEMIA, WITH LONG-TERM CURRENT USE OF INSULIN (HCC): ICD-10-CM

## 2022-11-10 DIAGNOSIS — M79.674 PAIN OF TOE OF RIGHT FOOT: ICD-10-CM

## 2022-11-10 DIAGNOSIS — L81.9 DISCOLORATION OF SKIN OF TOE: ICD-10-CM

## 2022-11-10 LAB
ALBUMIN SERPL BCP-MCNC: 4 G/DL (ref 3.2–4.9)
ALBUMIN/GLOB SERPL: 1.3 G/DL
ALP SERPL-CCNC: 60 U/L (ref 30–99)
ALT SERPL-CCNC: 24 U/L (ref 2–50)
ANION GAP SERPL CALC-SCNC: 9 MMOL/L (ref 7–16)
AST SERPL-CCNC: 21 U/L (ref 12–45)
BASOPHILS # BLD AUTO: 1.5 % (ref 0–1.8)
BASOPHILS # BLD: 0.14 K/UL (ref 0–0.12)
BILIRUB SERPL-MCNC: 0.2 MG/DL (ref 0.1–1.5)
BUN SERPL-MCNC: 29 MG/DL (ref 8–22)
CALCIUM SERPL-MCNC: 8.9 MG/DL (ref 8.5–10.5)
CHLORIDE SERPL-SCNC: 107 MMOL/L (ref 96–112)
CO2 SERPL-SCNC: 22 MMOL/L (ref 20–33)
CREAT SERPL-MCNC: 1.47 MG/DL (ref 0.5–1.4)
CRP SERPL HS-MCNC: 0.37 MG/DL (ref 0–0.75)
EOSINOPHIL # BLD AUTO: 0.23 K/UL (ref 0–0.51)
EOSINOPHIL NFR BLD: 2.4 % (ref 0–6.9)
ERYTHROCYTE [DISTWIDTH] IN BLOOD BY AUTOMATED COUNT: 44.8 FL (ref 35.9–50)
ERYTHROCYTE [SEDIMENTATION RATE] IN BLOOD BY WESTERGREN METHOD: 7 MM/HOUR (ref 0–20)
EST. AVERAGE GLUCOSE BLD GHB EST-MCNC: 157 MG/DL
GFR SERPLBLD CREATININE-BSD FMLA CKD-EPI: 55 ML/MIN/1.73 M 2
GLOBULIN SER CALC-MCNC: 3 G/DL (ref 1.9–3.5)
GLUCOSE SERPL-MCNC: 155 MG/DL (ref 65–99)
HBA1C MFR BLD: 7.1 % (ref 4–5.6)
HCT VFR BLD AUTO: 51.4 % (ref 42–52)
HGB BLD-MCNC: 17.4 G/DL (ref 14–18)
IMM GRANULOCYTES # BLD AUTO: 0.01 K/UL (ref 0–0.11)
IMM GRANULOCYTES NFR BLD AUTO: 0.1 % (ref 0–0.9)
LYMPHOCYTES # BLD AUTO: 1.93 K/UL (ref 1–4.8)
LYMPHOCYTES NFR BLD: 20.4 % (ref 22–41)
MCH RBC QN AUTO: 31.6 PG (ref 27–33)
MCHC RBC AUTO-ENTMCNC: 33.9 G/DL (ref 33.7–35.3)
MCV RBC AUTO: 93.5 FL (ref 81.4–97.8)
MONOCYTES # BLD AUTO: 0.93 K/UL (ref 0–0.85)
MONOCYTES NFR BLD AUTO: 9.8 % (ref 0–13.4)
NEUTROPHILS # BLD AUTO: 6.21 K/UL (ref 1.82–7.42)
NEUTROPHILS NFR BLD: 65.8 % (ref 44–72)
NRBC # BLD AUTO: 0 K/UL
NRBC BLD-RTO: 0 /100 WBC
PLATELET # BLD AUTO: 252 K/UL (ref 164–446)
PMV BLD AUTO: 11 FL (ref 9–12.9)
POTASSIUM SERPL-SCNC: 4.7 MMOL/L (ref 3.6–5.5)
PROT SERPL-MCNC: 7 G/DL (ref 6–8.2)
RBC # BLD AUTO: 5.5 M/UL (ref 4.7–6.1)
SODIUM SERPL-SCNC: 138 MMOL/L (ref 135–145)
WBC # BLD AUTO: 9.5 K/UL (ref 4.8–10.8)

## 2022-11-10 PROCEDURE — 86140 C-REACTIVE PROTEIN: CPT

## 2022-11-10 PROCEDURE — 85025 COMPLETE CBC W/AUTO DIFF WBC: CPT

## 2022-11-10 PROCEDURE — 85652 RBC SED RATE AUTOMATED: CPT

## 2022-11-10 PROCEDURE — 36415 COLL VENOUS BLD VENIPUNCTURE: CPT

## 2022-11-10 PROCEDURE — 80053 COMPREHEN METABOLIC PANEL: CPT

## 2022-11-10 PROCEDURE — 99214 OFFICE O/P EST MOD 30 MIN: CPT | Performed by: FAMILY MEDICINE

## 2022-11-10 PROCEDURE — 83036 HEMOGLOBIN GLYCOSYLATED A1C: CPT

## 2022-11-10 NOTE — PROGRESS NOTES
"Subjective:     CC: Toe discoloration    HPI:   Mc is a 57-year-old male patient of Dr. Kauffman with a history of type 2 diabetes and previous right fifth toe amputation secondary to osteomyelitis who presents with concern of right fourth toe discoloration.  He has noticed this for about the last 1 to 2 weeks.  He does think it was precipitated by stubbing toe on furniture.  Pain is relatively minimal but he is noting that a purplish discoloration to the distal toe is not improving.  No open wound, no fevers.  He is mainly concerned as his previous osteomyelitis to the right fifth toe started with similar symptoms.    Medications, past medical history, allergies, and social history have been reviewed and updated.      Objective:       Exam:  /80 (BP Location: Left arm, Patient Position: Sitting, BP Cuff Size: Adult long)   Pulse (!) 102   Temp 36.2 °C (97.2 °F)   Ht 1.702 m (5' 7\")   Wt 96.6 kg (213 lb)   SpO2 94%   BMI 33.36 kg/m²  Body mass index is 33.36 kg/m².    Constitutional: Alert. Well appearing. No distress.  Skin: Warm, dry, good turgor, no visible rashes.  Eye: Equal, round and reactive to light, conjunctiva clear, lids normal.  ENMT: Moist mucous membranes.   Ext: Purple discoloration to the distal right fourth toe.  Right fifth toe is absent.  Minimal tenderness to the toe.  Sensation is grossly intact to light touch to the distal right foot.  Dorsalis pedis pulse 2+.  Neuro: Moves all four extremities. No facial droop.  Psych: Answers questions appropriately. Normal affect and mood.      Assessment & Plan:     57 y.o. male with a history of type 2 diabetes and previous right fifth toe amputation secondary to osteomyelitis presents with discoloration to the distal right fourth toe.  He does report he may have stubbed the toe on a piece of furniture prior to the symptoms starting but he is concerned as this is how his prior episode of osteomyelitis began.  Chart reviewed and there is an " MRI from 2020 showing likely osteomyelitis to the right fourth and fifth toes.  He has purple discoloration to the right fourth toe but there is little tenderness, no surrounding erythema.  No open wound.  I think it is possible this is traumatic but given his history will work-up further for osteomyelitis and set up Ortho referral due to concern for recurrent osteomyelitis.  Labs and foot x-ray ordered as below.  Discussed ER precautions.    1. Discoloration of skin of toe  - CBC WITH DIFFERENTIAL; Future  - Comp Metabolic Panel; Future  - Sed Rate; Future  - CRP QUANTITIVE (NON-CARDIAC); Future  - DX-FOOT-COMPLETE 3+ RIGHT; Future  - Referral to Orthopedics    2. Pain of toe of right foot  - DX-FOOT-COMPLETE 3+ RIGHT; Future    3. Type 2 diabetes mellitus with hyperglycemia, with long-term current use of insulin (HCC)  - HEMOGLOBIN A1C; Future      Please note that this note was created using voice recognition software.

## 2022-11-11 ENCOUNTER — APPOINTMENT (OUTPATIENT)
Dept: RADIOLOGY | Facility: MEDICAL CENTER | Age: 57
End: 2022-11-11
Attending: FAMILY MEDICINE
Payer: COMMERCIAL

## 2022-11-11 DIAGNOSIS — M79.674 PAIN OF TOE OF RIGHT FOOT: ICD-10-CM

## 2022-11-11 DIAGNOSIS — L81.9 DISCOLORATION OF SKIN OF TOE: ICD-10-CM

## 2022-11-11 PROCEDURE — 73630 X-RAY EXAM OF FOOT: CPT | Mod: RT

## 2022-11-17 ENCOUNTER — TELEPHONE (OUTPATIENT)
Dept: MEDICAL GROUP | Facility: LAB | Age: 57
End: 2022-11-17

## 2022-11-17 ENCOUNTER — OFFICE VISIT (OUTPATIENT)
Dept: MEDICAL GROUP | Facility: LAB | Age: 57
End: 2022-11-17
Payer: COMMERCIAL

## 2022-11-17 VITALS
BODY MASS INDEX: 34.21 KG/M2 | RESPIRATION RATE: 16 BRPM | DIASTOLIC BLOOD PRESSURE: 80 MMHG | WEIGHT: 218 LBS | HEART RATE: 96 BPM | OXYGEN SATURATION: 94 % | HEIGHT: 67 IN | SYSTOLIC BLOOD PRESSURE: 142 MMHG | TEMPERATURE: 96.6 F

## 2022-11-17 DIAGNOSIS — Z79.4 TYPE 2 DIABETES MELLITUS WITH HYPERGLYCEMIA, WITH LONG-TERM CURRENT USE OF INSULIN (HCC): ICD-10-CM

## 2022-11-17 DIAGNOSIS — E11.65 TYPE 2 DIABETES MELLITUS WITH HYPERGLYCEMIA, WITH LONG-TERM CURRENT USE OF INSULIN (HCC): ICD-10-CM

## 2022-11-17 DIAGNOSIS — R23.0 TOE CYANOSIS: ICD-10-CM

## 2022-11-17 PROCEDURE — 99213 OFFICE O/P EST LOW 20 MIN: CPT | Performed by: INTERNAL MEDICINE

## 2022-11-17 RX ORDER — LISINOPRIL 10 MG/1
10 TABLET ORAL DAILY
Qty: 30 TABLET | Refills: 3 | Status: SHIPPED | OUTPATIENT
Start: 2022-11-17 | End: 2022-11-22 | Stop reason: SDUPTHER

## 2022-11-17 ASSESSMENT — FIBROSIS 4 INDEX: FIB4 SCORE: .969589689851674664

## 2022-11-17 NOTE — LETTER
[facility]  Thiago Kauffman D.O.  [address]  [place] [address]  Fax: [phone]   Authorization for Release/Disclosure of   Protected Health Information   Name: YUDITH EUGENE : 1965 SSN: [id number]   Address: [facility] [facility] [address]  [place] [address] Phone:    [phone] (home)    I authorize the entity listed below to release/disclose the PHI below to:   [facility]/Thiago Kauffman D.O. and Thiago Kauffman D.O.   Provider or Entity Name:   [facility]   Address   City, [facility]   Phone:      Fax:  [phone]   Reason for request: continuity of care   Information to be released:    [  ] LAST COLONOSCOPY,  including any PATH REPORT and follow-up  [  ] LAST FIT/COLOGUARD RESULT [  ] LAST DEXA  [  ] LAST MAMMOGRAM  [  ] LAST PAP  [  ] LAST LABS [ xxx ] [facility] EXAM REPORT  [  ] IMMUNIZATION RECORDS  [  ] Release all info      [  ] Check here and initial the line next to each item to release ALL health information INCLUDING  _____ Care and treatment for drug and / or alcohol abuse  _____ HIV testing, infection status, or AIDS  _____ Genetic Testing    DATES OF SERVICE OR TIME PERIOD TO BE DISCLOSED: _____________  I understand and acknowledge that:  * This Authorization may be revoked at any time by you in writing, except if your health information has already been used or disclosed.  * Your health information that will be used or disclosed as a result of you signing this authorization could be re-disclosed by the recipient. If this occurs, your re-disclosed health information may no longer be protected by State or Federal laws.  * You may refuse to sign this Authorization. Your refusal will not affect your ability to obtain treatment.  * This Authorization becomes effective upon signing and will  on (date) __________.      If no date is indicated, this Authorization will  one (1) year from the signature date.    Name: Yudith Eugene    Signature:  Continuity of Care   Date:     11/17/2022       PLEASE FAX REQUESTED RECORDS BACK TO: [phone]

## 2022-11-18 ENCOUNTER — HOSPITAL ENCOUNTER (OUTPATIENT)
Dept: LAB | Facility: MEDICAL CENTER | Age: 57
End: 2022-11-18
Attending: INTERNAL MEDICINE
Payer: COMMERCIAL

## 2022-11-18 DIAGNOSIS — R23.0 TOE CYANOSIS: ICD-10-CM

## 2022-11-18 LAB
CRP SERPL HS-MCNC: 0.33 MG/DL (ref 0–0.75)
ERYTHROCYTE [SEDIMENTATION RATE] IN BLOOD BY WESTERGREN METHOD: 8 MM/HOUR (ref 0–20)

## 2022-11-18 PROCEDURE — 86140 C-REACTIVE PROTEIN: CPT

## 2022-11-18 PROCEDURE — 36415 COLL VENOUS BLD VENIPUNCTURE: CPT

## 2022-11-18 PROCEDURE — 85652 RBC SED RATE AUTOMATED: CPT

## 2022-11-18 NOTE — TELEPHONE ENCOUNTER
Letter sent for eye records by Megan.     We received records from  [facility] and last eye exam was 7/29/21. I called Mc and asked if he has had more recent eye exam. He said they are no longer accepting him as patient there.   He needs new referral to another eye doctor. I let him know I will send note to Dr. Kauffman to put in referral.     12/14/22: I let Mc know authorization for referral to eye doctor is approved and to call [facility] [phone] to schedule. He agreed.

## 2022-11-18 NOTE — PROGRESS NOTES
CC: Mc Eugene is a 57 y.o. male is suffering from   Chief Complaint   Patient presents with    Foot Problem     Patient states the pain has increased, discoloration has gone down         SUBJECTIVE:  1. Toe cyanosis  Cristopher is here for follow-up, previously has a right amputated little toe, now is having problems with cyanosis associated with the fourth and great toe        Past social, family, history:   Social History     Tobacco Use    Smoking status: Former     Packs/day: 0.50     Years: 10.00     Pack years: 5.00     Types: Cigarettes     Quit date: 1996     Years since quittin.8    Smokeless tobacco: Never   Vaping Use    Vaping Use: Every day   Substance Use Topics    Alcohol use: Yes     Comment: once per month     Drug use: Yes     Types: Inhaled     Comment: marijuana daily          MEDICATIONS:    Current Outpatient Medications:     lisinopril (PRINIVIL) 10 MG Tab, Take 1 Tablet by mouth every day., Disp: 30 Tablet, Rfl: 3    atorvastatin (LIPITOR) 10 MG Tab, Take 1 Tablet by mouth every day., Disp: 90 Tablet, Rfl: 3    ONETOUCH VERIO strip, USE ONE STRIP TO TEST THREE TIMES A DAY AND AS NEEDED FOR SIGNS AND SYMPTOMS OF HIGH OR LOW SUGAR, Disp: 100 Strip, Rfl: 3    vitamin D (CHOLECALCIFEROL) 1000 Unit (25 mcg) Tab, Take 2,000 Units by mouth 2 times a day., Disp: , Rfl:     Multiple Vitamin (MULTIVITAMIN ADULT PO), Take 1 Tablet by mouth 2 times a day., Disp: , Rfl:     Ascorbic Acid (VITAMIN C) 1000 MG Tab, Take 1 Tablet by mouth every morning., Disp: , Rfl:     Zinc Sulfate (ZINC 15 PO), Take 1 Tablet by mouth every morning., Disp: , Rfl:     Blood Glucose Test Strips, Test strips order: Test strips for One Touch Verio meter. Sig: use tid and prn ssx high or low sugar #100 RF x 11, Disp: 100 Each, Rfl: 11    PROBLEMS:  Patient Active Problem List    Diagnosis Date Noted    Dupuytren contracture 2021    Leukocytosis 2020    Essential hypertension 2020    Type 2  "diabetes mellitus with hyperglycemia, with long-term current use of insulin ([facility]) 08/01/2020    Diabetic foot infection ([facility]) 08/01/2020    Class 1 obesity due to excess calories without serious comorbidity with body mass index (BMI) of 33.0 to 33.9 in adult 08/01/2020       REVIEW OF SYSTEMS:  Gen.:  No Nausea, Vomiting, fever, Chills.  Heart: No chest pain.  Lungs:  No shortness of Breath.  Psychological: Vincenzo unusual Anxiety depression     PHYSICAL EXAM   Constitutional: Alert, cooperative, not in acute distress.  Cardiovascular:  Rate Rhythm is regular without murmurs rubs clicks.     Thorax & Lungs: Clear to auscultation, no wheezing, rhonchi, or rales  HENT: Normocephalic, Atraumatic.  Eyes: PERRLA, EOMI, Conjunctiva normal.   Neck: Trachia is midline no swelling of the thyroid.   Lymphatic: No lymphadenopathy noted.   Musculoskeletal: Moderate cyanosis associate with the right fourth toe also great toe, toes appear to be warm not cool  Neurologic: Alert & oriented x 3, cranial nerves II through XII are intact, Normal motor function, Normal sensory function, No focal deficits noted.   Psychiatric: Affect normal, Judgment normal, Mood normal.     VITAL SIGNS:BP (!) 142/80   Pulse 96   Temp 35.9 °C (96.6 °F) (Temporal)   Resp 16   Ht 1.702 m (5' 7\")   Wt 98.9 kg (218 lb)   SpO2 94%   BMI 34.14 kg/m²     Labs: Reviewed    Assessment:                                                     Plan:    1. Toe cyanosis  Sedimentation rate C-reactive protein all ordered increase lisinopril from 5 to 10 mg ultrasound ordered of the right arterial system right leg  - Sed Rate; Future  - CRP QUANTITIVE (NON-CARDIAC); Future  - lisinopril (PRINIVIL) 10 MG Tab; Take 1 Tablet by mouth every day.  Dispense: 30 Tablet; Refill: 3  - US-EXTREMITY ARTERY LOWER UNILAT W/AGUSTINA (COMBO) RIGHT; Future        "

## 2022-11-21 ENCOUNTER — TELEPHONE (OUTPATIENT)
Dept: MEDICAL GROUP | Facility: LAB | Age: 57
End: 2022-11-21
Payer: COMMERCIAL

## 2022-11-21 NOTE — TELEPHONE ENCOUNTER
I spoke with Mc and asked if he has seen eye doctor in last year. He has not; said he will call to schedule with  [facility]. I let him know his colonoscopy referral has  and he said he will work out with Dr. Kauffman at next visit.

## 2022-11-22 DIAGNOSIS — R23.0 TOE CYANOSIS: ICD-10-CM

## 2022-11-22 RX ORDER — LISINOPRIL 10 MG/1
10 TABLET ORAL DAILY
Qty: 90 TABLET | Refills: 3 | Status: SHIPPED | OUTPATIENT
Start: 2022-11-22 | End: 2023-05-09

## 2022-11-22 RX ORDER — BLOOD SUGAR DIAGNOSTIC
STRIP MISCELLANEOUS
Qty: 100 STRIP | Refills: 3 | Status: ON HOLD | OUTPATIENT
Start: 2022-11-22 | End: 2022-12-14

## 2022-11-22 RX ORDER — ATORVASTATIN CALCIUM 10 MG/1
10 TABLET, FILM COATED ORAL DAILY
Qty: 90 TABLET | Refills: 3 | Status: SHIPPED | OUTPATIENT
Start: 2022-11-22

## 2022-11-23 ENCOUNTER — HOSPITAL ENCOUNTER (OUTPATIENT)
Dept: RADIOLOGY | Facility: MEDICAL CENTER | Age: 57
End: 2022-11-23
Attending: NURSE PRACTITIONER
Payer: COMMERCIAL

## 2022-11-23 DIAGNOSIS — G62.9 NEUROPATHY: ICD-10-CM

## 2022-11-23 DIAGNOSIS — M79.671 RIGHT FOOT PAIN: ICD-10-CM

## 2022-11-23 DIAGNOSIS — Z79.4 TYPE 2 DIABETES MELLITUS WITH HYPERGLYCEMIA, WITH LONG-TERM CURRENT USE OF INSULIN (HCC): ICD-10-CM

## 2022-11-23 DIAGNOSIS — E11.65 TYPE 2 DIABETES MELLITUS WITH HYPERGLYCEMIA, WITH LONG-TERM CURRENT USE OF INSULIN (HCC): ICD-10-CM

## 2022-11-23 PROCEDURE — 93922 UPR/L XTREMITY ART 2 LEVELS: CPT

## 2022-11-23 PROCEDURE — 93926 LOWER EXTREMITY STUDY: CPT | Mod: RT

## 2022-11-23 PROCEDURE — 73720 MRI LWR EXTREMITY W/O&W/DYE: CPT | Mod: RT

## 2022-11-23 PROCEDURE — A9576 INJ PROHANCE MULTIPACK: HCPCS | Performed by: NURSE PRACTITIONER

## 2022-11-23 PROCEDURE — 700117 HCHG RX CONTRAST REV CODE 255: Performed by: NURSE PRACTITIONER

## 2022-11-23 RX ADMIN — GADOTERIDOL 20 ML: 279.3 INJECTION, SOLUTION INTRAVENOUS at 16:14

## 2022-12-01 ENCOUNTER — OFFICE VISIT (OUTPATIENT)
Dept: MEDICAL GROUP | Facility: LAB | Age: 57
End: 2022-12-01
Payer: COMMERCIAL

## 2022-12-01 VITALS
SYSTOLIC BLOOD PRESSURE: 108 MMHG | HEART RATE: 100 BPM | TEMPERATURE: 96.7 F | BODY MASS INDEX: 32.96 KG/M2 | OXYGEN SATURATION: 100 % | RESPIRATION RATE: 16 BRPM | HEIGHT: 67 IN | WEIGHT: 210 LBS | DIASTOLIC BLOOD PRESSURE: 68 MMHG

## 2022-12-01 DIAGNOSIS — E11.65 TYPE 2 DIABETES MELLITUS WITH HYPERGLYCEMIA, WITH LONG-TERM CURRENT USE OF INSULIN (HCC): ICD-10-CM

## 2022-12-01 DIAGNOSIS — I99.8 VASCULAR INSUFFICIENCY: ICD-10-CM

## 2022-12-01 DIAGNOSIS — Z79.4 TYPE 2 DIABETES MELLITUS WITH HYPERGLYCEMIA, WITH LONG-TERM CURRENT USE OF INSULIN (HCC): ICD-10-CM

## 2022-12-01 PROCEDURE — 99213 OFFICE O/P EST LOW 20 MIN: CPT | Performed by: INTERNAL MEDICINE

## 2022-12-01 ASSESSMENT — FIBROSIS 4 INDEX: FIB4 SCORE: .969589689851674664

## 2022-12-01 NOTE — PROCEDURES
CC: Mc Eugene is a 57 y.o. male is suffering from   Chief Complaint   Patient presents with    Diabetes     1 month follow up         SUBJECTIVE:  1. Vascular insufficiency  Cristopher is here for follow-up has a history of severe vascular insufficiency involving his right leg patient previously has had amputations associated with 2 toes of his right foot.  Ultrasound study showed that he has greater than 75% narrowing associate with a proximal femoral artery right leg, patient has duskiness, and minimal blood flow into the right great toe    2. Type 2 diabetes mellitus with hyperglycemia, with long-term current use of insulin ([facility])  Type 2 diabetes with good control        Past social, family, history:  Works for [employer]  Social History     Tobacco Use    Smoking status: Former     Packs/day: 0.50     Years: 10.00     Pack years: 5.00     Types: Cigarettes     Quit date: 1996     Years since quittin.9    Smokeless tobacco: Never   Vaping Use    Vaping Use: Every day   Substance Use Topics    Alcohol use: Yes     Comment: once per month     Drug use: Yes     Types: Inhaled     Comment: marijuana daily          MEDICATIONS:    Current Outpatient Medications:     lisinopril (PRINIVIL) 10 MG Tab, Take 1 Tablet by mouth every day., Disp: 90 Tablet, Rfl: 3    atorvastatin (LIPITOR) 10 MG Tab, Take 1 Tablet by mouth every day., Disp: 90 Tablet, Rfl: 3    vitamin D (CHOLECALCIFEROL) 1000 Unit (25 mcg) Tab, Take 2,000 Units by mouth 2 times a day., Disp: , Rfl:     Multiple Vitamin (MULTIVITAMIN ADULT PO), Take 1 Tablet by mouth 2 times a day., Disp: , Rfl:     Ascorbic Acid (VITAMIN C) 1000 MG Tab, Take 1 Tablet by mouth every morning., Disp: , Rfl:     glucose blood (ONETOUCH VERIO) strip, USE ONE STRIP TO TEST THREE TIMES A DAY AND AS NEEDED FOR SIGNS AND SYMPTOMS OF HIGH OR LOW SUGAR, Disp: 100 Strip, Rfl: 3    Zinc Sulfate (ZINC 15 PO), Take 1 Tablet by mouth every morning., Disp: , Rfl:      "Blood Glucose Test Strips, Test strips order: Test strips for One Touch Verio meter. Sig: use tid and prn ssx high or low sugar #100 RF x 11, Disp: 100 Each, Rfl: 11    PROBLEMS:  Patient Active Problem List    Diagnosis Date Noted    Dupuytren contracture 08/11/2021    Leukocytosis 09/08/2020    Essential hypertension 08/03/2020    Type 2 diabetes mellitus with hyperglycemia, with long-term current use of insulin (HCC) 08/01/2020    Diabetic foot infection (HCC) 08/01/2020    Class 1 obesity due to excess calories without serious comorbidity with body mass index (BMI) of 33.0 to 33.9 in adult 08/01/2020       REVIEW OF SYSTEMS:  Gen.:  No Nausea, Vomiting, fever, Chills.  Heart: No chest pain.  Lungs:  No shortness of Breath.  Psychological: Vincenzo unusual Anxiety depression     PHYSICAL EXAM   Constitutional: Alert, cooperative, not in acute distress.  Cardiovascular:  Rate Rhythm is regular without murmurs rubs clicks.     Thorax & Lungs: Clear to auscultation, no wheezing, rhonchi, or rales  HENT: Normocephalic, Atraumatic.  Eyes: PERRLA, EOMI, Conjunctiva normal.   Neck: Trachia is midline no swelling of the thyroid.   Musculoskeletal: Cyanosis of the right great toe  Neurologic: Alert & oriented x 3, cranial nerves II through XII are intact, Normal motor function, Normal sensory function, No focal deficits noted.   Psychiatric: Affect normal, Judgment normal, Mood normal.     VITAL SIGNS:/68   Pulse 100   Temp 35.9 °C (96.7 °F) (Temporal)   Resp 16   Ht 1.702 m (5' 7\")   Wt 95.3 kg (210 lb)   SpO2 100%   BMI 32.89 kg/m²     Labs: Reviewed    Assessment:                                                     Plan:    1. Vascular insufficiency  Severe vascular insufficiency, Case was reviewed and discussed with Dr. Livingston on-call for vascular surgery at [facility].  Patient will be seen this upcoming Tuesday possible Wednesday intervention.  I have warned the patient he is at risk for amputation of " his right great toe  - Referral to Vascular Surgery    2. Type 2 diabetes mellitus with hyperglycemia, with long-term current use of insulin (HCC)  History of type 2 diabetes, POCT retinal exam done  - POCT [facility] Exam

## 2022-12-06 ENCOUNTER — OFFICE VISIT (OUTPATIENT)
Dept: VASCULAR SURGERY | Facility: MEDICAL CENTER | Age: 57
End: 2022-12-06
Payer: COMMERCIAL

## 2022-12-06 VITALS
TEMPERATURE: 97.4 F | WEIGHT: 207 LBS | HEIGHT: 67 IN | BODY MASS INDEX: 32.49 KG/M2 | SYSTOLIC BLOOD PRESSURE: 122 MMHG | HEART RATE: 104 BPM | OXYGEN SATURATION: 96 % | DIASTOLIC BLOOD PRESSURE: 66 MMHG

## 2022-12-06 DIAGNOSIS — I77.9 PAOD (PERIPHERAL ARTERIAL OCCLUSIVE DISEASE) (HCC): ICD-10-CM

## 2022-12-06 PROCEDURE — 99203 OFFICE O/P NEW LOW 30 MIN: CPT | Performed by: SURGERY

## 2022-12-06 ASSESSMENT — FIBROSIS 4 INDEX: FIB4 SCORE: .969589689851674664

## 2022-12-06 NOTE — TELEPHONE ENCOUNTER
Bassam:  Referral has been written to [facility] for Cristopher.   Regards, Thiago Kauffman DO

## 2022-12-06 NOTE — H&P
Vascular Surgery            New Patient Consultation    Patient:Mc Eugene  MRN:[id number]  Primary care physician:Thiago Kauffman D.O.  Referring Provider: Trace Kauffman DO    Vascular Consultant: Howie Lubin MD    Date: 12/6/2022  _____________________________________________________    Chief Complaint:     Chief Complaint   Patient presents with    New Patient     Vascular insufficiency         History of Present Illness:   Mc Eugene  is a 57 y.o. year old male who presented to his primary care provider with a blue right great toe.  Patient reports that 2 years ago he had an amputation of his right fifth toe for a similar event.  The patient reports that approximately 3 weeks ago he developed pain and discomfort in his right great toe.  Noninvasive arterial studies demonstrated significant stenosis within the right superficial femoral artery.  The patient remains quite active.  In fact the patient just climbed [employer] last week.  On examination he has a well-healed right fifth toe amputation incision.  The right great toe is dusky and blue.  Noninvasive arterial studies demonstrate flatline PPG at that toe but near normal ankle-brachial index.    Past Medical History:     Past Medical History:   Diagnosis Date    Cataract     right IOL     Dental disorder     partial upper     Diabetes (HCC)     diet controlled     High cholesterol     Hypertension     Kidney anomaly, congenital     born with 1 kidney    Sleep apnea     hx of, no cpap since losing weight     Snoring      Past Surgical History:     Past Surgical History:   Procedure Laterality Date    FASCIECTOMY Left 9/23/2021    Procedure: FASCIECTOMY - PALM, MIDDLE AND RING FINGER;  Surgeon: Paul Vines M.D.;  Location: SURGERY SAME DAY [facility];  Service: Orthopedics    TOE AMPUTATION Right 9/9/2020    Procedure: AMPUTATION, TOE - 5th;  Surgeon: Liban Mallory M.D.;  Location: SURGERY [facility];   Service: Orthopedics    INCISION AND DRAINAGE GENERAL Right 9/9/2020    Procedure: INCISION AND DRAINAGE - POSSIBLE METATARSAL RESECTION;  Surgeon: Liban Mallory M.D.;  Location: SURGERY [facility];  Service: Orthopedics    TOE AMPUTATION Right 8/6/2020    Procedure: AMPUTATION, TOE - 5th TOE;  Surgeon: Liban Mallory M.D.;  Location: SURGERY [facility];  Service: Orthopedics    APPENDECTOMY      CATARACT EXTRACTION WITH IOL Right      Allergies:     Allergies   Allergen Reactions    Bactrim Ds      Rash and high potassium     Medications:     Outpatient Encounter Medications as of 12/6/2022   Medication Sig Dispense Refill    lisinopril (PRINIVIL) 10 MG Tab Take 1 Tablet by mouth every day. 90 Tablet 3    glucose blood (ONETOUCH VERIO) strip USE ONE STRIP TO TEST THREE TIMES A DAY AND AS NEEDED FOR SIGNS AND SYMPTOMS OF HIGH OR LOW SUGAR 100 Strip 3    atorvastatin (LIPITOR) 10 MG Tab Take 1 Tablet by mouth every day. 90 Tablet 3    vitamin D (CHOLECALCIFEROL) 1000 Unit (25 mcg) Tab Take 2,000 Units by mouth 2 times a day.      Multiple Vitamin (MULTIVITAMIN ADULT PO) Take 1 Tablet by mouth 2 times a day.      Ascorbic Acid (VITAMIN C) 1000 MG Tab Take 1 Tablet by mouth every morning.      Zinc Sulfate (ZINC 15 PO) Take 1 Tablet by mouth every morning.      Blood Glucose Test Strips Test strips order: Test strips for One Touch Verio meter. Sig: use tid and prn ssx high or low sugar #100 RF x 11 100 Each 11     No facility-administered encounter medications on file as of 12/6/2022.     Social History:     Social History     Socioeconomic History    Marital status:      Spouse name: Not on file    Number of children: Not on file    Years of education: Not on file    Highest education level: Not on file   Occupational History    Not on file   Tobacco Use    Smoking status: Former     Packs/day: 0.50     Years: 10.00     Pack years: 5.00     Types: Cigarettes     Quit date: 1/1/1996      Years since quittin.9    Smokeless tobacco: Never   Vaping Use    Vaping Use: Every day   Substance and Sexual Activity    Alcohol use: Yes     Comment: once per month     Drug use: Yes     Types: Inhaled     Comment: marijuana daily     Sexual activity: Not on file   Other Topics Concern    Not on file   Social History Narrative    Not on file     Social Determinants of Health     Financial Resource Strain: Not on file   Food Insecurity: Not on file   Transportation Needs: Not on file   Physical Activity: Not on file   Stress: Not on file   Social Connections: Not on file   Intimate Partner Violence: Not on file   Housing Stability: Not on file      Social History     Tobacco Use   Smoking Status Former    Packs/day: 0.50    Years: 10.00    Pack years: 5.00    Types: Cigarettes    Quit date: 1996    Years since quittin.9   Smokeless Tobacco Never     Social History     Substance and Sexual Activity   Alcohol Use Yes    Comment: once per month      Social History     Substance and Sexual Activity   Drug Use Yes    Types: Inhaled    Comment: marijuana daily       Family History:     Family History   Problem Relation Age of Onset    Lung Disease Mother     Cancer Mother        Review of Systems:   Constitutional: Negative for fever or chills  HENT:   Negative for hearing loss or tinnitus    Eyes:    Negative for blurred vision or loss of vision  Respiratory:  Negative for cough or hemoptysis  Cardiac:  Negative for chest pain or palpitations  Vascular:  Negative for claudication, Negative for rest pain  Gastrointestinal: Negative for vomiting or abdominal pain     Negative for hematochezia or melena   Genitourinary: Negative for dysuria or hematuria   Musculoskeletal: Negative for myalgias or acute joint pain  Skin:   Negative for itching or rash  Neurological:  Negative for dizziness or headaches     Negative for speech disturbance     Negative for extremity weakness or  "paresthesias  Endo/Heme:  Negative for easy bruising or bleeding       Exam:   /66 (BP Location: Left arm, Patient Position: Sitting, BP Cuff Size: Adult)   Pulse (!) 104   Temp 36.3 °C (97.4 °F) (Temporal)   Ht 1.702 m (5' 7\")   Wt 93.9 kg (207 lb)   SpO2 96%   BMI 32.42 kg/m²     Constitutional: Alert, oriented, no acute distress  HEENT:  Normocephalic and atraumatic, EOMI  Neck:   Supple, no JVD,   Cardiovascular: Regular rate and rhythm,   Pulmonary:  Good air entry bilaterally,    Abdominal:  Soft, non-tender, non-distended         Musculoskeletal: Ischemic right great toe  Neurological:  CN II-XII grossly intact, no focal deficits  Skin:   Skin is warm and dry. No rash noted.  Vascular exam: 1+ pedal pulse right foot       Imaging:   FINDINGS   Right-   Diffuse plaque and multiphasic flow throughout the common femoral,    superficial femoral, and popliteal arteries.   > 75% stenosis in the proximal femoral artery.    Three vessel runoff to the ankle.   Triphasic flow in the anterior tibial artery.   Bi, non-reverse flow at the posterior tibial artery.   Biphasic flow in the peroneal artery.      Assessment and Plan:   -Peripheral arterial disease with blue toe syndrome.  This is likely atheroembolic from the superficial femoral artery stenosis.  I have recommended a right lower extremity angiogram and possible intervention.  Risk benefits alternatives and expectations discussed with the patient he agrees to proceed.            _____________________________________________________  Howie Lubin MD  [facility]  [phone]  [address], [place] [address]      "

## 2022-12-13 ENCOUNTER — OFFICE VISIT (OUTPATIENT)
Dept: MEDICAL GROUP | Facility: LAB | Age: 57
End: 2022-12-13
Payer: COMMERCIAL

## 2022-12-13 ENCOUNTER — APPOINTMENT (OUTPATIENT)
Dept: ADMISSIONS | Facility: MEDICAL CENTER | Age: 57
End: 2022-12-13
Attending: SURGERY
Payer: COMMERCIAL

## 2022-12-13 VITALS
WEIGHT: 209 LBS | SYSTOLIC BLOOD PRESSURE: 122 MMHG | HEIGHT: 67 IN | TEMPERATURE: 98.8 F | OXYGEN SATURATION: 99 % | DIASTOLIC BLOOD PRESSURE: 78 MMHG | HEART RATE: 88 BPM | RESPIRATION RATE: 16 BRPM | BODY MASS INDEX: 32.8 KG/M2

## 2022-12-13 DIAGNOSIS — E11.65 TYPE 2 DIABETES MELLITUS WITH HYPERGLYCEMIA, WITH LONG-TERM CURRENT USE OF INSULIN (HCC): ICD-10-CM

## 2022-12-13 DIAGNOSIS — Z79.4 TYPE 2 DIABETES MELLITUS WITH HYPERGLYCEMIA, WITH LONG-TERM CURRENT USE OF INSULIN (HCC): ICD-10-CM

## 2022-12-13 DIAGNOSIS — I99.8 ISCHEMIA OF TOE: ICD-10-CM

## 2022-12-13 PROCEDURE — 99213 OFFICE O/P EST LOW 20 MIN: CPT | Performed by: INTERNAL MEDICINE

## 2022-12-13 ASSESSMENT — FIBROSIS 4 INDEX: FIB4 SCORE: .969589689851674664

## 2022-12-14 ENCOUNTER — APPOINTMENT (OUTPATIENT)
Dept: RADIOLOGY | Facility: MEDICAL CENTER | Age: 57
End: 2022-12-14
Attending: SURGERY
Payer: COMMERCIAL

## 2022-12-14 ENCOUNTER — HOSPITAL ENCOUNTER (OUTPATIENT)
Facility: MEDICAL CENTER | Age: 57
End: 2022-12-14
Attending: SURGERY | Admitting: SURGERY
Payer: COMMERCIAL

## 2022-12-14 VITALS
HEIGHT: 67 IN | OXYGEN SATURATION: 93 % | RESPIRATION RATE: 16 BRPM | HEART RATE: 88 BPM | DIASTOLIC BLOOD PRESSURE: 66 MMHG | SYSTOLIC BLOOD PRESSURE: 128 MMHG | WEIGHT: 209.22 LBS | TEMPERATURE: 96.9 F | BODY MASS INDEX: 32.84 KG/M2

## 2022-12-14 DIAGNOSIS — Z84.89 FAMILY HISTORY OF STENT: ICD-10-CM

## 2022-12-14 DIAGNOSIS — I77.9 DISEASE OF ARTERY (HCC): ICD-10-CM

## 2022-12-14 LAB
ANION GAP SERPL CALC-SCNC: 10 MMOL/L (ref 7–16)
BUN SERPL-MCNC: 20 MG/DL (ref 8–22)
CALCIUM SERPL-MCNC: 9.4 MG/DL (ref 8.5–10.5)
CHLORIDE SERPL-SCNC: 107 MMOL/L (ref 96–112)
CO2 SERPL-SCNC: 24 MMOL/L (ref 20–33)
CREAT SERPL-MCNC: 1.06 MG/DL (ref 0.5–1.4)
EKG IMPRESSION: NORMAL
GFR SERPLBLD CREATININE-BSD FMLA CKD-EPI: 82 ML/MIN/1.73 M 2
GLUCOSE BLD STRIP.AUTO-MCNC: 99 MG/DL (ref 65–99)
GLUCOSE SERPL-MCNC: 91 MG/DL (ref 65–99)
POTASSIUM SERPL-SCNC: 4.8 MMOL/L (ref 3.6–5.5)
SODIUM SERPL-SCNC: 141 MMOL/L (ref 135–145)

## 2022-12-14 PROCEDURE — 36415 COLL VENOUS BLD VENIPUNCTURE: CPT

## 2022-12-14 PROCEDURE — 93010 ELECTROCARDIOGRAM REPORT: CPT | Performed by: INTERNAL MEDICINE

## 2022-12-14 PROCEDURE — 37226 PR REVASCULARIZE FEM/POP ARTERY,ANGIOPLASTY/*: CPT | Performed by: SURGERY

## 2022-12-14 PROCEDURE — 160036 HCHG PACU - EA ADDL 30 MINS PHASE I

## 2022-12-14 PROCEDURE — 160035 HCHG PACU - 1ST 60 MINS PHASE I

## 2022-12-14 PROCEDURE — 82962 GLUCOSE BLOOD TEST: CPT

## 2022-12-14 PROCEDURE — 700105 HCHG RX REV CODE 258: Performed by: SURGERY

## 2022-12-14 PROCEDURE — 37226 IR-EXTREMITY ANGIOGRAM-UNILATERAL: CPT | Mod: RT

## 2022-12-14 PROCEDURE — 93005 ELECTROCARDIOGRAM TRACING: CPT | Performed by: SURGERY

## 2022-12-14 PROCEDURE — 700111 HCHG RX REV CODE 636 W/ 250 OVERRIDE (IP): Performed by: SURGERY

## 2022-12-14 PROCEDURE — 75710 ARTERY X-RAYS ARM/LEG: CPT | Mod: 26,59 | Performed by: SURGERY

## 2022-12-14 PROCEDURE — 80048 BASIC METABOLIC PNL TOTAL CA: CPT

## 2022-12-14 PROCEDURE — 700117 HCHG RX CONTRAST REV CODE 255: Performed by: SURGERY

## 2022-12-14 PROCEDURE — 160002 HCHG RECOVERY MINUTES (STAT)

## 2022-12-14 PROCEDURE — 160046 HCHG PACU - 1ST 60 MINS PHASE II

## 2022-12-14 RX ORDER — HEPARIN SODIUM,PORCINE 1000/ML
VIAL (ML) INJECTION
Status: COMPLETED | OUTPATIENT
Start: 2022-12-14 | End: 2022-12-14

## 2022-12-14 RX ORDER — SODIUM CHLORIDE 9 MG/ML
500 INJECTION, SOLUTION INTRAVENOUS
Status: DISCONTINUED | OUTPATIENT
Start: 2022-12-14 | End: 2022-12-14 | Stop reason: HOSPADM

## 2022-12-14 RX ORDER — CLOPIDOGREL BISULFATE 75 MG/1
75 TABLET ORAL DAILY
Qty: 30 TABLET | Refills: 0 | Status: SHIPPED | OUTPATIENT
Start: 2022-12-14

## 2022-12-14 RX ORDER — MIDAZOLAM HYDROCHLORIDE 1 MG/ML
.5-2 INJECTION INTRAMUSCULAR; INTRAVENOUS PRN
Status: DISCONTINUED | OUTPATIENT
Start: 2022-12-14 | End: 2022-12-14 | Stop reason: HOSPADM

## 2022-12-14 RX ORDER — IODIXANOL 270 MG/ML
35 INJECTION, SOLUTION INTRAVASCULAR ONCE
Status: COMPLETED | OUTPATIENT
Start: 2022-12-14 | End: 2022-12-14

## 2022-12-14 RX ORDER — SODIUM CHLORIDE, SODIUM LACTATE, POTASSIUM CHLORIDE, CALCIUM CHLORIDE 600; 310; 30; 20 MG/100ML; MG/100ML; MG/100ML; MG/100ML
1000 INJECTION, SOLUTION INTRAVENOUS CONTINUOUS
Status: DISCONTINUED | OUTPATIENT
Start: 2022-12-14 | End: 2022-12-14 | Stop reason: HOSPADM

## 2022-12-14 RX ADMIN — HEPARIN SODIUM 5000 UNITS: 1000 INJECTION, SOLUTION INTRAVENOUS; SUBCUTANEOUS at 16:44

## 2022-12-14 RX ADMIN — SODIUM CHLORIDE, POTASSIUM CHLORIDE, SODIUM LACTATE AND CALCIUM CHLORIDE 1000 ML: 600; 310; 30; 20 INJECTION, SOLUTION INTRAVENOUS at 13:16

## 2022-12-14 RX ADMIN — IODIXANOL 35 ML: 270 INJECTION, SOLUTION INTRAVASCULAR at 17:45

## 2022-12-14 ASSESSMENT — PAIN DESCRIPTION - PAIN TYPE
TYPE: SURGICAL PAIN
TYPE: SURGICAL PAIN
TYPE: ACUTE PAIN
TYPE: SURGICAL PAIN

## 2022-12-14 ASSESSMENT — FIBROSIS 4 INDEX: FIB4 SCORE: .969589689851674664

## 2022-12-14 NOTE — OR NURSING
Assume care for patient in pre-op. NPO status and allergies verified. Surgical procedure verified by patient. PIV inserted. All questions answered. Belongings locked-up. Call light within reach.    Daily recreational marijuana use    EKG done  BMP sent  FSBS at 99 md/dl

## 2022-12-14 NOTE — PROGRESS NOTES
CC: Mc Eugene is a 57 y.o. male is suffering from   Chief Complaint   Patient presents with    Follow-Up     2 weeks follow up         SUBJECTIVE:  1. Type 2 diabetes mellitus with hyperglycemia, with long-term current use of insulin (HCC)  Cristopher is here for follow-up has a history of type 2 diabetes, is doing reasonably well.    2. Ischemia of toe  Patient with ischemia of the right great toe, is pending surgery tomorrow.        Past social, family, history: ,  Elda Alfonso  Social History     Tobacco Use    Smoking status: Former     Packs/day: 0.50     Years: 10.00     Pack years: 5.00     Types: Cigarettes     Quit date: 1996     Years since quittin.9    Smokeless tobacco: Never   Vaping Use    Vaping Use: Every day   Substance Use Topics    Alcohol use: Not Currently     Comment: once per month     Drug use: Yes     Types: Inhaled     Comment: marijuana daily          MEDICATIONS:    Current Outpatient Medications:     lisinopril (PRINIVIL) 10 MG Tab, Take 1 Tablet by mouth every day., Disp: 90 Tablet, Rfl: 3    atorvastatin (LIPITOR) 10 MG Tab, Take 1 Tablet by mouth every day., Disp: 90 Tablet, Rfl: 3    vitamin D (CHOLECALCIFEROL) 1000 Unit (25 mcg) Tab, Take 2,000 Units by mouth 2 times a day., Disp: , Rfl:     Multiple Vitamin (MULTIVITAMIN ADULT PO), Take 1 Tablet by mouth 2 times a day., Disp: , Rfl:     Ascorbic Acid (VITAMIN C) 1000 MG Tab, Take 1 Tablet by mouth every morning., Disp: , Rfl:     Zinc Sulfate (ZINC 15 PO), Take 1 Tablet by mouth every morning., Disp: , Rfl:     glucose blood (ONETOUCH VERIO) strip, USE ONE STRIP TO TEST THREE TIMES A DAY AND AS NEEDED FOR SIGNS AND SYMPTOMS OF HIGH OR LOW SUGAR, Disp: 100 Strip, Rfl: 3    Blood Glucose Test Strips, Test strips order: Test strips for One Touch Verio meter. Sig: use tid and prn ssx high or low sugar #100 RF x 11, Disp: 100 Each, Rfl: 11    PROBLEMS:  Patient Active Problem List    Diagnosis Date Noted  "   Dupuytren contracture 08/11/2021    Leukocytosis 09/08/2020    Essential hypertension 08/03/2020    Type 2 diabetes mellitus with hyperglycemia, with long-term current use of insulin (HCC) 08/01/2020    Diabetic foot infection (HCC) 08/01/2020    Class 1 obesity due to excess calories without serious comorbidity with body mass index (BMI) of 33.0 to 33.9 in adult 08/01/2020       REVIEW OF SYSTEMS:  Gen.:  No Nausea, Vomiting, fever, Chills.  Heart: No chest pain.  Lungs:  No shortness of Breath.  Psychological: Vincezno unusual Anxiety depression     PHYSICAL EXAM   Constitutional: Alert, cooperative, not in acute distress.  Cardiovascular:  Rate Rhythm is regular without murmurs rubs clicks.     Thorax & Lungs: Clear to auscultation, no wheezing, rhonchi, or rales  HENT: Normocephalic, Atraumatic.  Eyes: PERRLA, EOMI, Conjunctiva normal.   Neck: Trachia is midline no swelling of the thyroid.   Lymphatic: No lymphadenopathy noted.   Musculoskeletal: Ischemia right great toe  Neurologic: Alert & oriented x 3, cranial nerves II through XII are intact, Normal motor function, Normal sensory function, No focal deficits noted.   Psychiatric: Affect normal, Judgment normal, Mood normal.     VITAL SIGNS:/78   Pulse 88   Temp 37.1 °C (98.8 °F) (Temporal)   Resp 16   Ht 1.702 m (5' 7\")   Wt 94.8 kg (209 lb)   SpO2 99%   BMI 32.73 kg/m²     Labs: Reviewed    Assessment:                                                     Plan:  1. Type 2 diabetes mellitus with hyperglycemia, with long-term current use of insulin ([facility])  Improved diabetic control  - HEMOGLOBIN A1C; Future    2. Ischemia of toe  Pending surgery tomorrow Dr. Zaragoza.     "

## 2022-12-14 NOTE — PROGRESS NOTES
Med Rec Complete per patient   Allergies Reviewed with patient  No antibiotics within the last 30 days  Patient's Preferred Pharmacy:  [facility] on [address]

## 2022-12-15 NOTE — PROGRESS NOTES
Pt presents to OR19. Pt was consented by MD at bedside, confirmed by this RN and consent at bedside. Patient underwent a right lower extremity angiogram with intervention by Dr. Lubin. Site marked and initialed by MD prior to procedure starting and verified by patient and procedure team. Pedal pulses prior to case Right 1+ and Left 1+. Patient was placed in a supine position. Left femoral artery was accessed. Vitals were taken every 5 minutes and remained stable during procedure (see doc flow sheet for results). CO2 waveform capnography was monitored and remained WNL throughout procedure. 6 fr angioseal was used to achieve hemostasis. A gauze and tegaderm dressing was placed over surgical site. Report called to PACU Cyndie PIERRE. Pt transported via gurney with RN to PACU 2A in a stable condition.    stent    REF: [id number]  LOT: [id number]  Exp: 12-       Angioseal    REF: [id number]  LOT: [id number]  Exp: 08/31/2023

## 2022-12-15 NOTE — OR NURSING
Patient arrived to PACU in stable condition.  L groin site, closed with angioseal. Dressing CDI and soft  Wife Kate updated on patient condition   Bedrest maintained per order  Patient tolerated clears without nausea or vomiting  Report given to Megan PIERRE. Patient transferred to phase 2

## 2022-12-15 NOTE — DISCHARGE INSTRUCTIONS
"HOME CARE INSTRUCTIONS    ACTIVITY: Rest and take it easy for the first 24 hours.  A responsible adult is recommended to remain with you during that time.  It is normal to feel sleepy.  We encourage you to not do anything that requires balance, judgment or coordination.    FOR 24 HOURS DO NOT:  Drive, operate machinery or run household appliances.  Drink beer or alcoholic beverages.  Make important decisions or sign legal documents.    SPECIAL INSTRUCTIONS: SPECIAL INSTRUCTIONS:  Post Angiogram Groin Care Instructions     INSTRUCTIONS  Examine (look and feel) the site of your incision site TODAY so you can recognize changes that should be called to your doctor (see below).  Avoid straining either by lifting or pulling objects for 4-5 days. Avoid lifting over 5 pounds.   For at least 72 hours, if you should sneeze or cough, please hold pressure over your groin area.  If you should begin to have oozing from the catheterization site, please hold firm pressure and call your doctor's office immediately.  If profuse bleeding occurs from the catheterization site, hold firm pressure and call \"911\" immediately for assistance.  Remove bandage after 24 hours.     ACTIVITY  Limit activity as instructed by your doctor.  No driving or very limited driving with frequent stops for one week.   If you must take a long car ride, stop every hour and walk around the car.   Warm showers or baths are permitted after the bandage is removed. Avoid hot showers, baths, hot tubs, and swimming for one week.    PLEASE CALL YOUR DOCTOR IF:  Temperature elevation occurs.  Catheterization site becomes reddened or begins to drain.   Bruising appears to be new or not resolving. The bruise may move down your leg. This is normal.  The small round lump in the groin increases in size.  Any leg numbness, aching, or discomfort (immediately).  Increasing discomfort in the leg at the insertion site.  Chest pains, even if relieved by " Nitroglycerin.    MISCELLANEOUS INSTRUCTIONS  Bruising may occur as a result of heart catheterization. Some of the discoloration may travel down the leg, going from blue to green in color.  A small round lump under the catheterization site will remain for up to six weeks.  If any questions arise call your physician's office. The Contact Center is open Monday through Friday 7AM to 5PM and may speak to a nurse at [phone], or toll free at [phone].   You should call 911 if you develop problems with breathing or chest pain.    FOR PROBLEMS CALL DR AT: *[phone]*    DIET: To avoid nausea, slowly advance diet as tolerated, avoiding spicy or greasy foods for the first day.  Add more substantial food to your diet according to your physician's instructions.  INCREASE FLUIDS AND FIBER TO AVOID CONSTIPATION.    SURGICAL DRESSING/BATHING: *may remove dressing in 24 hours and then shower*     DIET: To avoid nausea, slowly advance diet as tolerated, avoiding spicy or greasy foods for the first day.  Add more substantial food to your diet according to your physician's instructions.  INCREASE FLUIDS AND FIBER TO AVOID CONSTIPATION.      MEDICATIONS: Resume taking daily medication.  Take prescribed pain medication with food.  If no medication is prescribed, you may take non-aspirin pain medication if needed.  PAIN MEDICATION CAN BE VERY CONSTIPATING.  Take a stool softener or laxative such as senokot, pericolace, or milk of magnesia if needed.    Prescription given for plavix.  Last pain medication given at NA.    A follow-up appointment should be arranged with your doctor in 1-2 weeks or as instructed; call to schedule.    You should CALL YOUR PHYSICIAN if you develop:  Fever greater than 101 degrees F.  Pain not relieved by medication, or persistent nausea or vomiting.  Excessive bleeding (blood soaking through dressing) or unexpected drainage from the wound.  Extreme redness or swelling around the incision site,  drainage of pus or foul smelling drainage.  Inability to urinate or empty your bladder within 8 hours.  Problems with breathing or chest pain.    You should call 911 if you develop problems with breathing or chest pain.  If you are unable to contact your doctor or surgical center, you should go to the nearest emergency room or urgent care center.  Physician's telephone #: Dr. Lubin: [phone]    MILD FLU-LIKE SYMPTOMS ARE NORMAL.  YOU MAY EXPERIENCE GENERALIZED MUSCLE ACHES, THROAT IRRITATION, HEADACHE AND/OR SOME NAUSEA.    If any questions arise, call your doctor.  If your doctor is not available, please feel free to call the Surgical Center at [phone].  The Center is open Monday through Friday from 7AM to 7PM.      A registered nurse may call you a few days after your surgery to see how you are doing after your procedure.    You may also receive a survey in the mail within the next two weeks and we ask that you take a few moments to complete the survey and return it to us.  Our goal is to provide you with very good care and we value your comments.     Depression / Suicide Risk    As you are discharged from this [facility] facility, it is important to learn how to keep safe from harming yourself.    Recognize the warning signs:  Abrupt changes in personality, positive or negative- including increase in energy   Giving away possessions  Change in eating patterns- significant weight changes-  positive or negative  Change in sleeping patterns- unable to sleep or sleeping all the time   Unwillingness or inability to communicate  Depression  Unusual sadness, discouragement and loneliness  Talk of wanting to die  Neglect of personal appearance   Rebelliousness- reckless behavior  Withdrawal from people/activities they love  Confusion- inability to concentrate     If you or a loved one observes any of these behaviors or has concerns about self-harm, here's what you can do:  Talk about it- your feelings and  reasons for harming yourself  Remove any means that you might use to hurt yourself (examples: pills, rope, extension cords, firearm)  Get professional help from the community (Mental Health, Substance Abuse, psychological counseling)  Do not be alone:Call your Safe Contact- someone whom you trust who will be there for you.  Call your local CRISIS HOTLINE 299-9119 or [phone]  Call your local Children's Mobile Crisis Response Team [place] (684) 529-3746 or www.Samfind  Call the toll free National Suicide Prevention Hotlines   National Suicide Prevention Lifeline 100-849-LXDB (3953)  [place] Line Network 800-SUICIDE (659-5908)    I acknowledge receipt and understanding of these Home Care instructions.

## 2022-12-15 NOTE — OP REPORT
[facility] OPERATIVE NOTE    12/14/22      PRE-OPERATIVE DIAGNOSIS -     Peripheral arterial disease with right lower extremity thromboembolism  Ischemic right great toe    POST-OPERATIVE DIAGNOSIS -same    PROCEDURE PERFORMED -     Ultrasound-guided access left common femoral artery  Catheter placement third order artery  Selective angiogram right lower extremity  Angioplasty and stenting of right proximal superficial femoral artery using and a 7 mm x 40 mm self-expanding stent  Angio-Seal closure left femoral artery    SURGEON - Howie Lubin M.D.    ASSISTANT -none    TYPE OF ANESTHESIA -monitored sedation    ANESTHESIOLOGIST  -none    SPECIMENS -none    INDICATIONS - 57 y.o. gentleman on 2 separate occasions embolized to his toes.  The patient has a hemodynamically significant atherosclerotic lesion in the proximal superficial femoral artery which is likely the source.  The patient is being taken to the angiography suite today for angiography and probable stenting of the femoral artery.    PROCEDURE IN DETAIL -     Patient was taken to the hybrid endovascular suite at [facility] and placed in the supine position.  After adequate positioning and sedation the patient's bilateral groins were shaved prepped draped in sterile fashion.  1% lidocaine was infiltrated into the left groin region.  On ultrasound guidance a thin walled access needle was inserted to the left common femoral artery and a 5 Macedonian sheath was placed using a Seldinger technique.  Wire was advanced over the bifurcation followed by flush angiogram catheter into the right external iliac artery reaching third order status.  Selective angiogram of the right lower extremity was performed.  That angiogram demonstrated a hemodynamically significant stenosis within the proximal superficial femoral artery which was very focal in nature.  The remaining superficial femoral popliteal and tibial vessels were free of significant disease.   Next 5000 units of heparin was administered a wire was advanced over the bifurcation over a stiff wire support a 6 Moldovan destination sheath was advanced into the right common femoral artery once again reaching third order status.  A predeployment selective angiogram was performed and a 7 mm x 40 mm self-expanding stent was advanced across the area of stenosis and was deployed.  Post deployment angioplasty to 7 mm was performed.  Completion angiogram demonstrated complete resolution of the stenoses with no residual stenosis noted.  All sheaths catheters wires were retrieved and a 6 Moldovan Angio-Seal device was deployed in the left femoral artery after removing the sheath.  5000 units of heparin was administered and no protamine was administered.      _______________________  Howie Lubin M.D.

## 2023-01-12 ENCOUNTER — TELEPHONE (OUTPATIENT)
Dept: MEDICAL GROUP | Facility: LAB | Age: 58
End: 2023-01-12
Payer: COMMERCIAL

## 2023-01-12 NOTE — TELEPHONE ENCOUNTER
I called to get records from [facility] and was told he has not been scheduled. I called Mc and reminded him to call to schedule eye exam. He said he will.

## 2023-02-10 ENCOUNTER — HOSPITAL ENCOUNTER (OUTPATIENT)
Dept: LAB | Facility: MEDICAL CENTER | Age: 58
End: 2023-02-10
Attending: INTERNAL MEDICINE
Payer: COMMERCIAL

## 2023-02-10 DIAGNOSIS — E11.65 TYPE 2 DIABETES MELLITUS WITH HYPERGLYCEMIA, WITH LONG-TERM CURRENT USE OF INSULIN (HCC): ICD-10-CM

## 2023-02-10 DIAGNOSIS — Z79.4 TYPE 2 DIABETES MELLITUS WITH HYPERGLYCEMIA, WITH LONG-TERM CURRENT USE OF INSULIN (HCC): ICD-10-CM

## 2023-02-10 PROCEDURE — 83036 HEMOGLOBIN GLYCOSYLATED A1C: CPT

## 2023-02-10 PROCEDURE — 36415 COLL VENOUS BLD VENIPUNCTURE: CPT

## 2023-02-13 ENCOUNTER — OFFICE VISIT (OUTPATIENT)
Dept: MEDICAL GROUP | Facility: LAB | Age: 58
End: 2023-02-13
Payer: COMMERCIAL

## 2023-02-13 VITALS
TEMPERATURE: 98.1 F | OXYGEN SATURATION: 96 % | BODY MASS INDEX: 32.49 KG/M2 | DIASTOLIC BLOOD PRESSURE: 70 MMHG | HEIGHT: 67 IN | HEART RATE: 85 BPM | SYSTOLIC BLOOD PRESSURE: 120 MMHG | RESPIRATION RATE: 16 BRPM | WEIGHT: 207 LBS

## 2023-02-13 DIAGNOSIS — Z79.4 TYPE 2 DIABETES MELLITUS WITH HYPERGLYCEMIA, WITH LONG-TERM CURRENT USE OF INSULIN (HCC): ICD-10-CM

## 2023-02-13 DIAGNOSIS — E11.65 TYPE 2 DIABETES MELLITUS WITH HYPERGLYCEMIA, WITH LONG-TERM CURRENT USE OF INSULIN (HCC): ICD-10-CM

## 2023-02-13 DIAGNOSIS — L98.491 CALLOUS ULCER, LIMITED TO BREAKDOWN OF SKIN (HCC): ICD-10-CM

## 2023-02-13 LAB
EST. AVERAGE GLUCOSE BLD GHB EST-MCNC: 140 MG/DL
HBA1C MFR BLD: 6.5 % (ref 4–5.6)

## 2023-02-13 PROCEDURE — 99213 OFFICE O/P EST LOW 20 MIN: CPT | Performed by: INTERNAL MEDICINE

## 2023-02-13 ASSESSMENT — FIBROSIS 4 INDEX: FIB4 SCORE: .969589689851674664

## 2023-02-13 ASSESSMENT — PATIENT HEALTH QUESTIONNAIRE - PHQ9: CLINICAL INTERPRETATION OF PHQ2 SCORE: 0

## 2023-02-13 NOTE — PROGRESS NOTES
CC: Mc Eugene is a 57 y.o. male is suffering from   Chief Complaint   Patient presents with    Diabetes     2 months follow up         SUBJECTIVE:  1. Callous ulcer, limited to breakdown of skin (HCC)  Cristopher is here for follow-up has a callus ulcer, recommend he see a dermatologist    2. Type 2 diabetes mellitus with hyperglycemia, with long-term current use of insulin (HCC)  History of type 2 diabetes previously amputated right fifth toe right great toe looks significantly better than previous exams        Past social, family, history:   Social History     Tobacco Use    Smoking status: Former     Years: 10.00     Types: Cigarettes     Quit date: 1996     Years since quittin.1    Smokeless tobacco: Never   Vaping Use    Vaping Use: Every day   Substance Use Topics    Alcohol use: Not Currently     Comment: once per month     Drug use: Yes     Types: Inhaled     Comment: marijuana daily          MEDICATIONS:    Current Outpatient Medications:     clopidogrel (PLAVIX) 75 MG Tab, Take 1 Tablet by mouth every day., Disp: 30 Tablet, Rfl: 0    lisinopril (PRINIVIL) 10 MG Tab, Take 1 Tablet by mouth every day., Disp: 90 Tablet, Rfl: 3    atorvastatin (LIPITOR) 10 MG Tab, Take 1 Tablet by mouth every day., Disp: 90 Tablet, Rfl: 3    vitamin D (CHOLECALCIFEROL) 1000 Unit (25 mcg) Tab, Take 2,000 Units by mouth 2 times a day., Disp: , Rfl:     Multiple Vitamin (MULTIVITAMIN ADULT PO), Take 1 Tablet by mouth 2 times a day., Disp: , Rfl:     Ascorbic Acid (VITAMIN C) 1000 MG Tab, Take 1,000 mg by mouth 2 times a day., Disp: , Rfl:     Zinc Sulfate (ZINC 15 PO), Take 1 Tablet by mouth every morning., Disp: , Rfl:     PROBLEMS:  Patient Active Problem List    Diagnosis Date Noted    Dupuytren contracture 2021    Leukocytosis 2020    Essential hypertension 2020    Type 2 diabetes mellitus with hyperglycemia, with long-term current use of insulin ([facility]) 2020    Diabetic foot  "infection ([facility]) 08/01/2020    Class 1 obesity due to excess calories without serious comorbidity with body mass index (BMI) of 33.0 to 33.9 in adult 08/01/2020       REVIEW OF SYSTEMS:  Gen.:  No Nausea, Vomiting, fever, Chills.  Heart: No chest pain.  Lungs:  No shortness of Breath.  Psychological: Vincenzo unusual Anxiety depression     PHYSICAL EXAM   Constitutional: Alert, cooperative, not in acute distress.  Cardiovascular:  Rate Rhythm is regular without murmurs rubs clicks.     Thorax & Lungs: Clear to auscultation, no wheezing, rhonchi, or rales  HENT: Normocephalic, Atraumatic.  Eyes: PERRLA, EOMI, Conjunctiva normal.   Neck: Trachia is midline no swelling of the thyroid.   Lymphatic: No lymphadenopathy noted.     Musculoskeletal: Mild ulcer right great toe previous amputated right little toe  Neurologic: Alert & oriented x 3, cranial nerves II through XII are intact, Normal motor function, Normal sensory function, No focal deficits noted.   Psychiatric: Affect normal, Judgment normal, Mood normal.     VITAL SIGNS:/70   Pulse 85   Temp 36.7 °C (98.1 °F) (Temporal)   Resp 16   Ht 1.702 m (5' 7\")   Wt 93.9 kg (207 lb)   SpO2 96%   BMI 32.42 kg/m²     Labs: Reviewed    Assessment:                                                     Plan:    1. Callous ulcer, limited to breakdown of skin ([facility])  Referral written to podiatry  - Referral to Podiatry    2. Type 2 diabetes mellitus with hyperglycemia, with long-term current use of insulin ([facility])  Recheck hemoglobin A1c  - Lipid Profile; Future  - Microalbumin Creat Ratio Urine - Lab Collect; Future  - HEMOGLOBIN A1C; Future        "

## 2023-04-20 ENCOUNTER — TELEPHONE (OUTPATIENT)
Dept: MEDICAL GROUP | Facility: LAB | Age: 58
End: 2023-04-20
Payer: COMMERCIAL

## 2023-04-20 NOTE — TELEPHONE ENCOUNTER
I spoke with Mc and he said he had eye exam at [facility] about three weeks ago. I called and confirmed eye exam and faxed letter rqsting eye records to be sent to us.     Records in chart 4/21/23.

## 2023-05-09 ENCOUNTER — OFFICE VISIT (OUTPATIENT)
Dept: MEDICAL GROUP | Facility: LAB | Age: 58
End: 2023-05-09
Payer: COMMERCIAL

## 2023-05-09 VITALS
HEIGHT: 68 IN | RESPIRATION RATE: 16 BRPM | DIASTOLIC BLOOD PRESSURE: 76 MMHG | SYSTOLIC BLOOD PRESSURE: 130 MMHG | BODY MASS INDEX: 30.01 KG/M2 | WEIGHT: 198 LBS | OXYGEN SATURATION: 98 % | HEART RATE: 110 BPM | TEMPERATURE: 97.1 F

## 2023-05-09 DIAGNOSIS — E11.65 TYPE 2 DIABETES MELLITUS WITH HYPERGLYCEMIA, WITH LONG-TERM CURRENT USE OF INSULIN (HCC): ICD-10-CM

## 2023-05-09 DIAGNOSIS — T50.905A ADVERSE EFFECT OF DRUG, INITIAL ENCOUNTER: ICD-10-CM

## 2023-05-09 DIAGNOSIS — Z79.4 TYPE 2 DIABETES MELLITUS WITH HYPERGLYCEMIA, WITH LONG-TERM CURRENT USE OF INSULIN (HCC): ICD-10-CM

## 2023-05-09 PROCEDURE — 99213 OFFICE O/P EST LOW 20 MIN: CPT | Performed by: INTERNAL MEDICINE

## 2023-05-09 RX ORDER — LOSARTAN POTASSIUM 25 MG/1
25 TABLET ORAL DAILY
Qty: 90 TABLET | Refills: 3 | Status: SHIPPED | OUTPATIENT
Start: 2023-05-09

## 2023-05-09 RX ORDER — METHYLPREDNISOLONE 4 MG/1
TABLET ORAL
Qty: 21 TABLET | Refills: 0 | Status: SHIPPED | OUTPATIENT
Start: 2023-05-09

## 2023-05-09 ASSESSMENT — FIBROSIS 4 INDEX: FIB4 SCORE: .969589689851674664

## 2023-05-09 NOTE — PROGRESS NOTES
CC: Mc Eugene is a 57 y.o. male is suffering from   Chief Complaint   Patient presents with    Diabetes Follow-up    Rash    Spots and/or Floaters     Right eye floater         SUBJECTIVE:  1. Adverse effect of drug, initial encounter  Cristopehr is here for follow-up, is having problems with a severe skin rash associated with likely the use of lisinopril I recommended to be stopped but also recommended that he avoid the use of Plavix which had been stopped approximately 2 months prior.    2. Type 2 diabetes mellitus with hyperglycemia, with long-term current use of insulin ([facility])  Patient with a history of type 2 diabetes clinically stable        Past social, family, history: , works as a   Social History     Tobacco Use    Smoking status: Former     Years: 10.00     Types: Cigarettes     Quit date: 1996     Years since quittin.3    Smokeless tobacco: Never   Vaping Use    Vaping Use: Every day   Substance Use Topics    Alcohol use: Not Currently     Comment: once per month     Drug use: Yes     Types: Inhaled     Comment: marijuana daily          MEDICATIONS:    Current Outpatient Medications:     losartan (COZAAR) 25 MG Tab, Take 1 Tablet by mouth every day., Disp: 90 Tablet, Rfl: 3    methylPREDNISolone (MEDROL DOSEPAK) 4 MG Tablet Therapy Pack, As directed on the packaging label., Disp: 21 Tablet, Rfl: 0    clopidogrel (PLAVIX) 75 MG Tab, Take 1 Tablet by mouth every day., Disp: 30 Tablet, Rfl: 0    atorvastatin (LIPITOR) 10 MG Tab, Take 1 Tablet by mouth every day., Disp: 90 Tablet, Rfl: 3    vitamin D (CHOLECALCIFEROL) 1000 Unit (25 mcg) Tab, Take 2,000 Units by mouth 2 times a day., Disp: , Rfl:     Multiple Vitamin (MULTIVITAMIN ADULT PO), Take 1 Tablet by mouth 2 times a day., Disp: , Rfl:     Ascorbic Acid (VITAMIN C) 1000 MG Tab, Take 1,000 mg by mouth 2 times a day., Disp: , Rfl:     Zinc Sulfate (ZINC 15 PO), Take 1 Tablet by mouth every morning., Disp: , Rfl:  "    PROBLEMS:  Patient Active Problem List    Diagnosis Date Noted    Dupuytren contracture 08/11/2021    Leukocytosis 09/08/2020    Essential hypertension 08/03/2020    Type 2 diabetes mellitus with hyperglycemia, with long-term current use of insulin (HCC) 08/01/2020    Diabetic foot infection (HCC) 08/01/2020    Class 1 obesity due to excess calories without serious comorbidity with body mass index (BMI) of 33.0 to 33.9 in adult 08/01/2020       REVIEW OF SYSTEMS:  Gen.:  No Nausea, Vomiting, fever, Chills.  Heart: No chest pain.  Lungs:  No shortness of Breath.  Psychological: Vincenzo unusual Anxiety depression     PHYSICAL EXAM   Constitutional: Alert, cooperative, not in acute distress.  Cardiovascular:  Rate Rhythm is regular without murmurs rubs clicks.     Thorax & Lungs: Clear to auscultation, no wheezing, rhonchi, or rales  HENT: Normocephalic, Atraumatic.  Eyes: PERRLA, EOMI, Conjunctiva normal.   Neck: Trachia is midline no swelling of the thyroid.   Lymphatic: No lymphadenopathy noted.   Abdomin: Soft non-tender, no rebound, no guarding.   Skin: Likely drug reaction at posterior thorax.   Neurologic: Alert & oriented x 3, cranial nerves II through XII are intact, Normal motor function, Normal sensory function, No focal deficits noted.   Psychiatric: Affect normal, Judgment normal, Mood normal.     VITAL SIGNS:/76   Pulse (!) 110   Temp 36.2 °C (97.1 °F) (Temporal)   Resp 16   Ht 1.727 m (5' 8\")   Wt 89.8 kg (198 lb)   SpO2 98%   BMI 30.11 kg/m²     Labs: Reviewed    Assessment:                                                     Plan:    1. Adverse effect of drug, initial encounter  Start Medrol stop lisinopril  - methylPREDNISolone (MEDROL DOSEPAK) 4 MG Tablet Therapy Pack; As directed on the packaging label.  Dispense: 21 Tablet; Refill: 0  - Referral to Dermatology    2. Type 2 diabetes mellitus with hyperglycemia, with long-term current use of insulin (HCC)  Continue with losartan " (Cozaar)  - losartan (COZAAR) 25 MG Tab; Take 1 Tablet by mouth every day.  Dispense: 90 Tablet; Refill: 3  - methylPREDNISolone (MEDROL DOSEPAK) 4 MG Tablet Therapy Pack; As directed on the packaging label.  Dispense: 21 Tablet; Refill: 0  - Referral to Dermatology

## 2023-05-24 ENCOUNTER — OFFICE VISIT (OUTPATIENT)
Dept: MEDICAL GROUP | Facility: LAB | Age: 58
End: 2023-05-24
Payer: COMMERCIAL

## 2023-05-24 VITALS
HEIGHT: 67 IN | DIASTOLIC BLOOD PRESSURE: 82 MMHG | TEMPERATURE: 97.5 F | BODY MASS INDEX: 31.23 KG/M2 | HEART RATE: 97 BPM | SYSTOLIC BLOOD PRESSURE: 124 MMHG | RESPIRATION RATE: 12 BRPM | OXYGEN SATURATION: 95 % | WEIGHT: 199 LBS

## 2023-05-24 DIAGNOSIS — R21 SKIN RASH: ICD-10-CM

## 2023-05-24 DIAGNOSIS — I15.9 SECONDARY HYPERTENSION: ICD-10-CM

## 2023-05-24 PROCEDURE — 3074F SYST BP LT 130 MM HG: CPT | Performed by: INTERNAL MEDICINE

## 2023-05-24 PROCEDURE — 99213 OFFICE O/P EST LOW 20 MIN: CPT | Performed by: INTERNAL MEDICINE

## 2023-05-24 PROCEDURE — 3079F DIAST BP 80-89 MM HG: CPT | Performed by: INTERNAL MEDICINE

## 2023-05-24 ASSESSMENT — FIBROSIS 4 INDEX: FIB4 SCORE: .969589689851674664

## 2023-05-24 NOTE — PROGRESS NOTES
CC: Mc Eugene is a 57 y.o. male is suffering from   Chief Complaint   Patient presents with    Medication Management     2 weeks follow up         SUBJECTIVE:  1. Skin rash  Cristopher is here for follow-up regarding his skin rash states that this rapidly resolved with the discontinuance of lisinopril, states the steroids helped rapidly    2. Secondary hypertension  History of hypertension with adequate control with the use of losartan        Past social, family, history: ,   Social History     Tobacco Use    Smoking status: Former     Years: 10.00     Types: Cigarettes     Quit date: 1996     Years since quittin.4    Smokeless tobacco: Never   Vaping Use    Vaping Use: Every day   Substance Use Topics    Alcohol use: Not Currently     Comment: once per month     Drug use: Yes     Types: Inhaled     Comment: marijuana daily          MEDICATIONS:    Current Outpatient Medications:     losartan (COZAAR) 25 MG Tab, Take 1 Tablet by mouth every day., Disp: 90 Tablet, Rfl: 3    clopidogrel (PLAVIX) 75 MG Tab, Take 1 Tablet by mouth every day., Disp: 30 Tablet, Rfl: 0    atorvastatin (LIPITOR) 10 MG Tab, Take 1 Tablet by mouth every day., Disp: 90 Tablet, Rfl: 3    vitamin D (CHOLECALCIFEROL) 1000 Unit (25 mcg) Tab, Take 2,000 Units by mouth 2 times a day., Disp: , Rfl:     Multiple Vitamin (MULTIVITAMIN ADULT PO), Take 1 Tablet by mouth 2 times a day., Disp: , Rfl:     Ascorbic Acid (VITAMIN C) 1000 MG Tab, Take 1,000 mg by mouth 2 times a day., Disp: , Rfl:     Zinc Sulfate (ZINC 15 PO), Take 1 Tablet by mouth every morning., Disp: , Rfl:     methylPREDNISolone (MEDROL DOSEPAK) 4 MG Tablet Therapy Pack, As directed on the packaging label. (Patient not taking: Reported on 2023), Disp: 21 Tablet, Rfl: 0    PROBLEMS:  Patient Active Problem List    Diagnosis Date Noted    Dupuytren contracture 2021    Leukocytosis 2020    Essential hypertension 2020    Type 2  "diabetes mellitus with hyperglycemia, with long-term current use of insulin ([facility]) 08/01/2020    Diabetic foot infection ([facility]) 08/01/2020    Class 1 obesity due to excess calories without serious comorbidity with body mass index (BMI) of 33.0 to 33.9 in adult 08/01/2020       REVIEW OF SYSTEMS:  Gen.:  No Nausea, Vomiting, fever, Chills.  Heart: No chest pain.  Lungs:  No shortness of Breath.  Psychological: Vincenzo unusual Anxiety depression     PHYSICAL EXAM   Constitutional: Alert, cooperative, not in acute distress.  Cardiovascular:  Rate Rhythm is regular without murmurs rubs clicks.     Thorax & Lungs: Clear to auscultation, no wheezing, rhonchi, or rales  HENT: Normocephalic, Atraumatic.  Eyes: PERRLA, EOMI, Conjunctiva normal.   Neck: Trachia is midline no swelling of the thyroid.   Lymphatic: No lymphadenopathy noted.   Neurologic: Alert & oriented x 3, cranial nerves II through XII are intact, Normal motor function, Normal sensory function, No focal deficits noted.   Psychiatric: Affect normal, Judgment normal, Mood normal.     VITAL SIGNS:/82   Pulse 97   Temp 36.4 °C (97.5 °F) (Temporal)   Resp 12   Ht 1.702 m (5' 7\")   Wt 90.3 kg (199 lb)   SpO2 95%   BMI 31.17 kg/m²     Labs: Reviewed    Assessment:                                                     Plan:    1. Skin rash  Skin rash with complete resolution after discontinuing lisinopril    2. Secondary hypertension  Continue Cozaar currently well controlled        "

## 2023-06-14 ENCOUNTER — APPOINTMENT (OUTPATIENT)
Dept: NEPHROLOGY | Facility: MEDICAL CENTER | Age: 58
End: 2023-06-14
Payer: COMMERCIAL

## 2023-06-16 ENCOUNTER — TELEPHONE (OUTPATIENT)
Dept: NEPHROLOGY | Facility: MEDICAL CENTER | Age: 58
End: 2023-06-16
Payer: COMMERCIAL

## 2023-06-16 DIAGNOSIS — N18.9 CHRONIC KIDNEY DISEASE, UNSPECIFIED CKD STAGE: ICD-10-CM

## 2023-06-20 ENCOUNTER — APPOINTMENT (OUTPATIENT)
Dept: NEPHROLOGY | Facility: MEDICAL CENTER | Age: 58
End: 2023-06-20
Payer: COMMERCIAL

## 2023-06-27 NOTE — PROGRESS NOTES
Received report from day shift nurse. Assumed pt care at 1915. Pt is A&Ox4, resting comfortably in bed. Pt on r.a. No signs of SOB/respiratory distress. Labs noted, VSS. Pt c/o no pain at this moment. Will return for evening meds and assessment. Fall precautions in place. Bed at lowest position. Call light and personal belongings within reach. Continue to monitor   Bexarotene Pregnancy And Lactation Text: This medication is Pregnancy Category X and should not be given to women who are pregnant or may become pregnant. This medication should not be used if you are breast feeding.

## 2023-07-22 ENCOUNTER — HOSPITAL ENCOUNTER (OUTPATIENT)
Dept: LAB | Facility: MEDICAL CENTER | Age: 58
End: 2023-07-22
Attending: INTERNAL MEDICINE
Payer: COMMERCIAL

## 2023-07-22 DIAGNOSIS — N18.9 CHRONIC KIDNEY DISEASE, UNSPECIFIED CKD STAGE: ICD-10-CM

## 2023-07-22 LAB
ANION GAP SERPL CALC-SCNC: 10 MMOL/L (ref 7–16)
BUN SERPL-MCNC: 27 MG/DL (ref 8–22)
CALCIUM SERPL-MCNC: 8.9 MG/DL (ref 8.4–10.2)
CHLORIDE SERPL-SCNC: 105 MMOL/L (ref 96–112)
CO2 SERPL-SCNC: 22 MMOL/L (ref 20–33)
CREAT SERPL-MCNC: 1.25 MG/DL (ref 0.5–1.4)
CREAT UR-MCNC: 90.67 MG/DL
ERYTHROCYTE [DISTWIDTH] IN BLOOD BY AUTOMATED COUNT: 45.9 FL (ref 35.9–50)
GFR SERPLBLD CREATININE-BSD FMLA CKD-EPI: 67 ML/MIN/1.73 M 2
GLUCOSE SERPL-MCNC: 207 MG/DL (ref 65–99)
HCT VFR BLD AUTO: 48.6 % (ref 42–52)
HGB BLD-MCNC: 16.4 G/DL (ref 14–18)
MCH RBC QN AUTO: 31.8 PG (ref 27–33)
MCHC RBC AUTO-ENTMCNC: 33.7 G/DL (ref 32.3–36.5)
MCV RBC AUTO: 94.4 FL (ref 81.4–97.8)
MICROALBUMIN UR-MCNC: 346.1 MG/DL
MICROALBUMIN/CREAT UR: 3817 MG/G (ref 0–30)
PLATELET # BLD AUTO: 258 K/UL (ref 164–446)
PMV BLD AUTO: 10.3 FL (ref 9–12.9)
POTASSIUM SERPL-SCNC: 4.9 MMOL/L (ref 3.6–5.5)
RBC # BLD AUTO: 5.15 M/UL (ref 4.7–6.1)
SODIUM SERPL-SCNC: 137 MMOL/L (ref 135–145)
WBC # BLD AUTO: 8.5 K/UL (ref 4.8–10.8)

## 2023-07-22 PROCEDURE — 80048 BASIC METABOLIC PNL TOTAL CA: CPT

## 2023-07-22 PROCEDURE — 82043 UR ALBUMIN QUANTITATIVE: CPT

## 2023-07-22 PROCEDURE — 82570 ASSAY OF URINE CREATININE: CPT

## 2023-07-22 PROCEDURE — 36415 COLL VENOUS BLD VENIPUNCTURE: CPT

## 2023-07-22 PROCEDURE — 85027 COMPLETE CBC AUTOMATED: CPT

## 2023-07-25 ENCOUNTER — OFFICE VISIT (OUTPATIENT)
Dept: NEPHROLOGY | Facility: MEDICAL CENTER | Age: 58
End: 2023-07-25
Payer: COMMERCIAL

## 2023-07-25 VITALS
HEART RATE: 95 BPM | WEIGHT: 209 LBS | HEIGHT: 67 IN | SYSTOLIC BLOOD PRESSURE: 120 MMHG | TEMPERATURE: 98.7 F | BODY MASS INDEX: 32.8 KG/M2 | OXYGEN SATURATION: 95 % | DIASTOLIC BLOOD PRESSURE: 72 MMHG

## 2023-07-25 DIAGNOSIS — N18.9 CHRONIC KIDNEY DISEASE, UNSPECIFIED CKD STAGE: ICD-10-CM

## 2023-07-25 DIAGNOSIS — I10 ESSENTIAL HYPERTENSION: ICD-10-CM

## 2023-07-25 DIAGNOSIS — E11.65 TYPE 2 DIABETES MELLITUS WITH HYPERGLYCEMIA, WITH LONG-TERM CURRENT USE OF INSULIN (HCC): ICD-10-CM

## 2023-07-25 DIAGNOSIS — R80.9 PROTEINURIA, UNSPECIFIED TYPE: ICD-10-CM

## 2023-07-25 DIAGNOSIS — Z79.4 TYPE 2 DIABETES MELLITUS WITH HYPERGLYCEMIA, WITH LONG-TERM CURRENT USE OF INSULIN (HCC): ICD-10-CM

## 2023-07-25 PROCEDURE — 99214 OFFICE O/P EST MOD 30 MIN: CPT | Performed by: INTERNAL MEDICINE

## 2023-07-25 PROCEDURE — 3074F SYST BP LT 130 MM HG: CPT | Performed by: INTERNAL MEDICINE

## 2023-07-25 PROCEDURE — 3078F DIAST BP <80 MM HG: CPT | Performed by: INTERNAL MEDICINE

## 2023-07-25 ASSESSMENT — ENCOUNTER SYMPTOMS
NAUSEA: 0
CHILLS: 0
FEVER: 0
VOMITING: 0
SHORTNESS OF BREATH: 0
COUGH: 0
HYPERTENSION: 1

## 2023-07-25 ASSESSMENT — FIBROSIS 4 INDEX: FIB4 SCORE: 0.95

## 2023-07-25 NOTE — PROGRESS NOTES
"Subjective     Mc Eugene is a 57 y.o. male who presents with Hypertension and Chronic Kidney Disease            Hypertension  This is a chronic problem. The current episode started more than 1 year ago. The problem is unchanged. The problem is controlled. Pertinent negatives include no chest pain, malaise/fatigue, peripheral edema or shortness of breath. Risk factors for coronary artery disease include male gender and diabetes mellitus. Past treatments include angiotensin blockers. The current treatment provides significant improvement. There are no compliance problems.  Hypertensive end-organ damage includes kidney disease. Identifiable causes of hypertension include chronic renal disease.   Chronic Kidney Disease  This is a chronic problem. The current episode started more than 1 year ago. The problem occurs constantly. The problem has been unchanged. Pertinent negatives include no chest pain, chills, coughing, fever, nausea, urinary symptoms or vomiting.       Review of Systems   Constitutional:  Negative for chills, fever and malaise/fatigue.   Respiratory:  Negative for cough and shortness of breath.    Cardiovascular:  Negative for chest pain and leg swelling.   Gastrointestinal:  Negative for nausea and vomiting.   Genitourinary:  Negative for dysuria, frequency and urgency.              Objective     /72 (BP Location: Right arm, Patient Position: Sitting, BP Cuff Size: Adult)   Pulse 95   Temp 37.1 °C (98.7 °F) (Temporal)   Ht 1.702 m (5' 7\")   Wt 94.8 kg (209 lb)   SpO2 95%   BMI 32.73 kg/m²      Physical Exam  Vitals and nursing note reviewed.   Constitutional:       General: He is not in acute distress.     Appearance: He is not ill-appearing.   HENT:      Head: Normocephalic and atraumatic.      Right Ear: External ear normal.      Left Ear: External ear normal.      Nose: Nose normal.   Eyes:      General:         Right eye: No discharge.         Left eye: No discharge.      " Conjunctiva/sclera: Conjunctivae normal.   Cardiovascular:      Rate and Rhythm: Normal rate and regular rhythm.      Heart sounds: No murmur heard.  Pulmonary:      Effort: Pulmonary effort is normal. No respiratory distress.      Breath sounds: Normal breath sounds. No wheezing.   Musculoskeletal:         General: No tenderness or deformity.      Right lower leg: No edema.      Left lower leg: No edema.   Skin:     General: Skin is warm.   Neurological:      General: No focal deficit present.      Mental Status: He is alert and oriented to person, place, and time.   Psychiatric:         Mood and Affect: Mood normal.         Behavior: Behavior normal.       Past Medical History:   Diagnosis Date    Cataract     right IOL     Dental disorder     partial upper     Diabetes (HCC)     diet controlled     High cholesterol     Hypertension     Kidney anomaly, congenital     born with 1 kidney    Sleep apnea     hx of, no cpap since losing weight     Snoring      Social History     Socioeconomic History    Marital status:      Spouse name: Not on file    Number of children: Not on file    Years of education: Not on file    Highest education level: Not on file   Occupational History    Not on file   Tobacco Use    Smoking status: Former     Years: 10.00     Types: Cigarettes     Quit date: 1996     Years since quittin.5    Smokeless tobacco: Never   Vaping Use    Vaping Use: Every day   Substance and Sexual Activity    Alcohol use: Not Currently     Comment: once per month     Drug use: Yes     Types: Inhaled     Comment: marijuana daily     Sexual activity: Not on file   Other Topics Concern    Not on file   Social History Narrative    Not on file     Social Determinants of Health     Financial Resource Strain: Not on file   Food Insecurity: Not on file   Transportation Needs: Not on file   Physical Activity: Not on file   Stress: Not on file   Social Connections: Not on file   Intimate Partner Violence:  Not on file   Housing Stability: Not on file     Family History   Problem Relation Age of Onset    Lung Disease Mother     Cancer Mother      Recent Labs     11/10/22  0843 12/14/22  1300 07/22/23  1227   ALBUMIN 4.0  --   --    SODIUM 138 141 137   POTASSIUM 4.7 4.8 4.9   CHLORIDE 107 107 105   CO2 22 24 22   BUN 29* 20 27*   CREATININE 1.47* 1.06 1.25                             Assessment & Plan        1. Chronic kidney disease, unspecified CKD stage  Stable  No uremic symptoms  Renal dose of medication  Avoid nephrotoxins  Continue same medication regimen  Patient was advised to call us if symptoms worsen      2. Proteinuria, unspecified type  Continue angiotensin receptor blocker    3. Type 2 diabetes mellitus with hyperglycemia, with long-term current use of insulin (HCC)  Continue to optimize diabetes control for hemoglobin A1c below 7%    4. Essential hypertension  Controlled  Continue same medication regimen  Continue low-sodium diet

## 2024-02-16 ENCOUNTER — PATIENT MESSAGE (OUTPATIENT)
Dept: HEALTH INFORMATION MANAGEMENT | Facility: OTHER | Age: 59
End: 2024-02-16

## (undated) DEVICE — SET LEADWIRE 5 LEAD BEDSIDE DISPOSABLE ECG (1SET OF 5/EA)

## (undated) DEVICE — SUCTION INSTRUMENT YANKAUER BULBOUS TIP W/O VENT (50EA/CA)

## (undated) DEVICE — GLOVE BIOGEL SZ 8 SURGICAL PF LTX - (50PR/BX 4BX/CA)

## (undated) DEVICE — MASK ANESTHESIA ADULT  - (100/CA)

## (undated) DEVICE — CANISTER SUCTION RIGID RED 1500CC (40EA/CA)

## (undated) DEVICE — SUTURE GENERAL

## (undated) DEVICE — SENSOR SPO2 NEO LNCS ADHESIVE (20/BX) SEE USER NOTES

## (undated) DEVICE — PACK LOWER EXTREMITY - (2/CA)

## (undated) DEVICE — SWAB ANAEROBIC SPEC.COLLECTOR - (25/PK 4PK/CA 100EA/CA)

## (undated) DEVICE — LACTATED RINGERS INJ 1000 ML - (14EA/CA 60CA/PF)

## (undated) DEVICE — PAD PREP 24 X 48 CUFFED - (100/CA)

## (undated) DEVICE — HUMID-VENT HEAT AND MOISTURE EXCHANGE- (50/BX)

## (undated) DEVICE — SODIUM CHL IRRIGATION 0.9% 1000ML (12EA/CA)

## (undated) DEVICE — TUBE CONNECTING SUCTION - CLEAR PLASTIC STERILE 72 IN (50EA/CA)

## (undated) DEVICE — ELECTRODE 850 FOAM ADHESIVE - HYDROGEL RADIOTRNSPRNT (50/PK)

## (undated) DEVICE — GLOVE, LITE (PAIR)

## (undated) DEVICE — TOWEL STOP TIMEOUT SAFETY FLAG (40EA/CA)

## (undated) DEVICE — PROTECTOR ULNA NERVE - (36PR/CA)

## (undated) DEVICE — GOWN WARMING STANDARD FLEX - (30/CA)

## (undated) DEVICE — HEAD HOLDER JUNIOR/ADULT

## (undated) DEVICE — WATER IRRIGATION STERILE 1000ML (12EA/CA)

## (undated) DEVICE — TIP INTPLS HFLO ML ORFC BTRY - (12/CS)  FOR SURGILAV

## (undated) DEVICE — BANDAGE ELASTIC 4 HONEYCOMB - 4"X5YD LF (20/CA)"

## (undated) DEVICE — SLEEVE, VASO, THIGH, MED

## (undated) DEVICE — CHLORAPREP 26 ML APPLICATOR - ORANGE TINT(25/CA)

## (undated) DEVICE — NEEDLE NON SAFETY HYPO 22 GA X 1 1/2 IN (100/BX)

## (undated) DEVICE — SYRINGE 10 ML CONTROL LL (25EA/BX 4BX/CA)

## (undated) DEVICE — SUTURE 4-0 VICRYL PLUS RB-1 - 27 INCH (36/BX)

## (undated) DEVICE — HANDPIECE 10FT INTPLS SCT PLS IRRIGATION HAND CONTROL SET (6/PK)

## (undated) DEVICE — TOURNIQUET, STERILE 18 (RED)

## (undated) DEVICE — BLADE SURGICAL #11 - (50/BX)

## (undated) DEVICE — BAG SPONGE COUNT 10.25 X 32 - BLUE (250/CA)

## (undated) DEVICE — DRAPE LAPAROTOMY T SHEET - (12EA/CA)

## (undated) DEVICE — KIT ANESTHESIA W/CIRCUIT & 3/LT BAG W/FILTER (20EA/CA)

## (undated) DEVICE — BLADE SURGICAL #15 - (50/BX 3BX/CA)

## (undated) DEVICE — NEPTUNE 4 PORT MANIFOLD - (20/PK)

## (undated) DEVICE — ELECTRODE DUAL RETURN W/ CORD - (50/PK)

## (undated) DEVICE — TUBING CLEARLINK DUO-VENT - C-FLO (48EA/CA)

## (undated) DEVICE — SODIUM CHL. IRRIGATION 0.9% 3000ML (4EA/CA 65CA/PF)

## (undated) DEVICE — PADDING CAST 4 IN STERILE - 4 X 4 YDS (24/CA)

## (undated) DEVICE — BLADE SAGITTAL SAW 9.4MM X 25.5MM X .4MM FINE TOOTH (1/EA)

## (undated) DEVICE — SET EXTENSION WITH 2 PORTS (48EA/CA) ***PART #2C8610 IS A SUBSTITUTE*****

## (undated) DEVICE — SUTURE 4-0 ETHILON FS-2 18 (36PK/BX)"

## (undated) DEVICE — SUTURE 0 PDS CT-2 (36PK/BX)

## (undated) DEVICE — TUBE, CULTURE AEROBIC

## (undated) DEVICE — CANISTER SUCTION 3000ML MECHANICAL FILTER AUTO SHUTOFF MEDI-VAC NONSTERILE LF DISP  (40EA/CA)

## (undated) DEVICE — PACK MINOR BASIN - (2EA/CA)